# Patient Record
Sex: MALE | Race: WHITE | NOT HISPANIC OR LATINO
[De-identification: names, ages, dates, MRNs, and addresses within clinical notes are randomized per-mention and may not be internally consistent; named-entity substitution may affect disease eponyms.]

---

## 2017-04-02 PROBLEM — Z00.00 ENCOUNTER FOR PREVENTIVE HEALTH EXAMINATION: Status: ACTIVE | Noted: 2017-04-02

## 2017-05-01 ENCOUNTER — APPOINTMENT (OUTPATIENT)
Dept: CARDIOLOGY | Facility: CLINIC | Age: 52
End: 2017-05-01

## 2017-05-01 ENCOUNTER — NON-APPOINTMENT (OUTPATIENT)
Age: 52
End: 2017-05-01

## 2017-05-01 VITALS
HEART RATE: 73 BPM | BODY MASS INDEX: 29.06 KG/M2 | DIASTOLIC BLOOD PRESSURE: 87 MMHG | OXYGEN SATURATION: 96 % | HEIGHT: 70 IN | WEIGHT: 203 LBS | SYSTOLIC BLOOD PRESSURE: 138 MMHG

## 2017-05-01 DIAGNOSIS — Z87.891 PERSONAL HISTORY OF NICOTINE DEPENDENCE: ICD-10-CM

## 2017-05-01 DIAGNOSIS — Z83.49 FAMILY HISTORY OF OTHER ENDOCRINE, NUTRITIONAL AND METABOLIC DISEASES: ICD-10-CM

## 2017-05-01 RX ORDER — ATORVASTATIN CALCIUM 40 MG/1
40 TABLET, FILM COATED ORAL
Qty: 90 | Refills: 3 | Status: ACTIVE | COMMUNITY

## 2017-05-25 ENCOUNTER — APPOINTMENT (OUTPATIENT)
Dept: CARDIOLOGY | Facility: CLINIC | Age: 52
End: 2017-05-25

## 2017-05-26 ENCOUNTER — OTHER (OUTPATIENT)
Age: 52
End: 2017-05-26

## 2017-08-13 ENCOUNTER — FORM ENCOUNTER (OUTPATIENT)
Age: 52
End: 2017-08-13

## 2017-08-14 ENCOUNTER — APPOINTMENT (OUTPATIENT)
Dept: MRI IMAGING | Facility: HOSPITAL | Age: 52
End: 2017-08-14

## 2017-08-14 ENCOUNTER — OUTPATIENT (OUTPATIENT)
Dept: OUTPATIENT SERVICES | Facility: HOSPITAL | Age: 52
LOS: 1 days | End: 2017-08-14
Payer: COMMERCIAL

## 2017-08-14 DIAGNOSIS — Q25.1 COARCTATION OF AORTA: ICD-10-CM

## 2017-08-14 DIAGNOSIS — R07.9 CHEST PAIN, UNSPECIFIED: ICD-10-CM

## 2017-08-14 PROCEDURE — A9585: CPT

## 2017-08-14 PROCEDURE — 71555 MRI ANGIO CHEST W OR W/O DYE: CPT | Mod: 26

## 2017-08-14 PROCEDURE — C8909: CPT

## 2017-11-20 ENCOUNTER — APPOINTMENT (OUTPATIENT)
Dept: CARDIOLOGY | Facility: CLINIC | Age: 52
End: 2017-11-20
Payer: COMMERCIAL

## 2017-11-20 ENCOUNTER — NON-APPOINTMENT (OUTPATIENT)
Age: 52
End: 2017-11-20

## 2017-11-20 VITALS
WEIGHT: 201 LBS | OXYGEN SATURATION: 96 % | SYSTOLIC BLOOD PRESSURE: 125 MMHG | DIASTOLIC BLOOD PRESSURE: 70 MMHG | BODY MASS INDEX: 28.77 KG/M2 | HEART RATE: 63 BPM | HEIGHT: 70 IN

## 2017-11-20 PROCEDURE — 99215 OFFICE O/P EST HI 40 MIN: CPT

## 2017-11-20 PROCEDURE — 93000 ELECTROCARDIOGRAM COMPLETE: CPT

## 2018-02-27 ENCOUNTER — APPOINTMENT (OUTPATIENT)
Dept: CARDIOLOGY | Facility: CLINIC | Age: 53
End: 2018-02-27
Payer: COMMERCIAL

## 2018-02-27 PROCEDURE — 93015 CV STRESS TEST SUPVJ I&R: CPT

## 2018-02-28 ENCOUNTER — OTHER (OUTPATIENT)
Age: 53
End: 2018-02-28

## 2018-02-28 DIAGNOSIS — R94.39 ABNORMAL RESULT OF OTHER CARDIOVASCULAR FUNCTION STUDY: ICD-10-CM

## 2018-03-19 ENCOUNTER — APPOINTMENT (OUTPATIENT)
Dept: CARDIOLOGY | Facility: CLINIC | Age: 53
End: 2018-03-19
Payer: COMMERCIAL

## 2018-03-19 PROCEDURE — A9500: CPT

## 2018-03-19 PROCEDURE — 78452 HT MUSCLE IMAGE SPECT MULT: CPT

## 2018-03-19 PROCEDURE — 93015 CV STRESS TEST SUPVJ I&R: CPT

## 2019-06-28 ENCOUNTER — NON-APPOINTMENT (OUTPATIENT)
Age: 54
End: 2019-06-28

## 2019-06-28 ENCOUNTER — APPOINTMENT (OUTPATIENT)
Dept: CARDIOLOGY | Facility: CLINIC | Age: 54
End: 2019-06-28
Payer: COMMERCIAL

## 2019-06-28 VITALS
HEIGHT: 70 IN | DIASTOLIC BLOOD PRESSURE: 81 MMHG | BODY MASS INDEX: 29.06 KG/M2 | SYSTOLIC BLOOD PRESSURE: 131 MMHG | OXYGEN SATURATION: 94 % | WEIGHT: 203 LBS | HEART RATE: 58 BPM

## 2019-06-28 PROCEDURE — 99214 OFFICE O/P EST MOD 30 MIN: CPT

## 2019-06-28 PROCEDURE — 93000 ELECTROCARDIOGRAM COMPLETE: CPT

## 2019-06-28 NOTE — PHYSICAL EXAM
[General Appearance - Well Developed] : well developed [Well Groomed] : well groomed [Normal Appearance] : normal appearance [No Deformities] : no deformities [General Appearance - In No Acute Distress] : no acute distress [General Appearance - Well Nourished] : well nourished [Normal Conjunctiva] : the conjunctiva exhibited no abnormalities [Eyelids - No Xanthelasma] : the eyelids demonstrated no xanthelasmas [Normal Oral Mucosa] : normal oral mucosa [No Oral Pallor] : no oral pallor [No Oral Cyanosis] : no oral cyanosis [Normal Jugular Venous A Waves Present] : normal jugular venous A waves present [Normal Jugular Venous V Waves Present] : normal jugular venous V waves present [Respiration, Rhythm And Depth] : normal respiratory rhythm and effort [No Jugular Venous Rachel A Waves] : no jugular venous rachel A waves [Auscultation Breath Sounds / Voice Sounds] : lungs were clear to auscultation bilaterally [Exaggerated Use Of Accessory Muscles For Inspiration] : no accessory muscle use [Abdomen Tenderness] : non-tender [Abdomen Soft] : soft [Abnormal Walk] : normal gait [Abdomen Mass (___ Cm)] : no abdominal mass palpated [Gait - Sufficient For Exercise Testing] : the gait was sufficient for exercise testing [Nail Clubbing] : no clubbing of the fingernails [Cyanosis, Localized] : no localized cyanosis [Petechial Hemorrhages (___cm)] : no petechial hemorrhages [Skin Color & Pigmentation] : normal skin color and pigmentation [] : no rash [No Venous Stasis] : no venous stasis [No Xanthoma] : no  xanthoma was observed [No Skin Ulcers] : no skin ulcer [Skin Lesions] : no skin lesions [Oriented To Time, Place, And Person] : oriented to person, place, and time [Affect] : the affect was normal [Normal Rate] : normal [No Anxiety] : not feeling anxious [Mood] : the mood was normal [Rhythm Regular] : regular [Normal S1] : normal S1 [Normal S2] : normal S2 [No Gallop] : no gallop heard [S3] : no S3 [II] : a grade 2 [S4] : no S4 [III] : a grade 3 [I] : a grade 1 [Right Carotid Bruit] : right carotid bruit heard [Left Carotid Bruit] : no bruit heard over the left carotid [Right Femoral Bruit] : no bruit heard over the right femoral artery [Left Femoral Bruit] : no bruit heard over the left femoral artery [No Pitting Edema] : no pitting edema present [1+] : left 1+ [Bruit] : no bruit heard

## 2019-06-28 NOTE — HISTORY OF PRESENT ILLNESS
[FreeTextEntry1] : Dean Atkins presented to the office today for a cardiovascular evaluation. He was last seen in the office in 2017.\par \par He is now 54 years old, with a history of coarctation of aorta, for which he had surgery many years ago.  A modest degree of stenosis remains based on MRA from August of 2017. He has a bicuspid aortic valve, with moderate AS based on echo from May, 2017.\par \par When he was evaluated in the office in 2017 he was feeling well. He reported dyspnea when he would "run", which was long-standing, and without change.\par \par He presents to the office today having been feeling well. He reports no change in the degree of dyspnea that he might experience with running. He does not run often.  He does not report chest discomfort suggestive of angina. He denies any other new symptoms, including dizziness or syncope. His blood pressure and cholesterol have been controlled.

## 2019-07-11 ENCOUNTER — APPOINTMENT (OUTPATIENT)
Dept: CARDIOLOGY | Facility: CLINIC | Age: 54
End: 2019-07-11
Payer: COMMERCIAL

## 2019-07-11 PROCEDURE — 93306 TTE W/DOPPLER COMPLETE: CPT

## 2019-08-06 ENCOUNTER — FORM ENCOUNTER (OUTPATIENT)
Age: 54
End: 2019-08-06

## 2019-08-07 ENCOUNTER — OUTPATIENT (OUTPATIENT)
Dept: OUTPATIENT SERVICES | Facility: HOSPITAL | Age: 54
LOS: 1 days | End: 2019-08-07
Payer: COMMERCIAL

## 2019-08-07 ENCOUNTER — APPOINTMENT (OUTPATIENT)
Dept: MRI IMAGING | Facility: CLINIC | Age: 54
End: 2019-08-07
Payer: COMMERCIAL

## 2019-08-07 DIAGNOSIS — Q25.1 COARCTATION OF AORTA: ICD-10-CM

## 2019-08-07 DIAGNOSIS — I71.2 THORACIC AORTIC ANEURYSM, WITHOUT RUPTURE: ICD-10-CM

## 2019-08-07 DIAGNOSIS — Q23.1 CONGENITAL INSUFFICIENCY OF AORTIC VALVE: ICD-10-CM

## 2019-08-07 PROCEDURE — C8911: CPT

## 2019-08-07 PROCEDURE — 71555 MRI ANGIO CHEST W OR W/O DYE: CPT | Mod: 26

## 2019-08-07 PROCEDURE — A9585: CPT

## 2020-02-11 NOTE — DISCUSSION/SUMMARY
Tele call placed to Sharmaine and she stated that [facility] pharmacy told her that Oxycodone 10 mg is not being approved at that they do not have Percocet refill on file.     Writer contacted [facility] pharmacy and pharmacist stated they have Percocet on file and that the oxycodone is not approved. Writer told pharmacy that we received \"Oxycontin SR Approved  Prior Authorization Number: [id number]  Dates of approval: Oxycontin 10mg extended-release tablet, use as directed, is approved for 6 months through 8/11/2020.   Filling Pharmacy: [facility]  Additional Information: Approvedtoday  Request Reference Number: [id number]. OXYCONTIN TAB 10MG CR is approved through 08/11/2020. For further questions, call [phone].  Pharmacy contacted - received paid claim.  Pharmacy will contact patient when medication is ready to be picked up. \"     Pharmacist tried running the medication through again and stated they are saying it is not approved. Writer read above message to pharmacist and then 5 seconds later they stated they have it and its approved.    Writer contacted patient and relayed above message and informed patient to call [facility] pharmacy to know when medication will be ready for . Patient verbalized understanding and denied further questions.    [FreeTextEntry1] : Dean has a history of a repaired coarctation of the aorta. He has a history of a bicuspid aortic valve with moderate stenosis. He has controlled hypertension and dyslipidemia. He presents to the office today feeling generally well.\par \par I have suggested a followup echocardiogram, which has not been performed in about 2 years. He will schedule this, and I will be in contact with him to discuss the results. I have requested his most recent cholesterol profile for my review. If all is well, he will see me back in the office in approximately one year.

## 2020-10-30 ENCOUNTER — NON-APPOINTMENT (OUTPATIENT)
Age: 55
End: 2020-10-30

## 2020-10-30 ENCOUNTER — APPOINTMENT (OUTPATIENT)
Dept: CARDIOLOGY | Facility: CLINIC | Age: 55
End: 2020-10-30
Payer: COMMERCIAL

## 2020-10-30 VITALS
HEART RATE: 84 BPM | WEIGHT: 198 LBS | HEIGHT: 70 IN | SYSTOLIC BLOOD PRESSURE: 124 MMHG | DIASTOLIC BLOOD PRESSURE: 80 MMHG | BODY MASS INDEX: 28.35 KG/M2 | OXYGEN SATURATION: 97 %

## 2020-10-30 PROCEDURE — 99214 OFFICE O/P EST MOD 30 MIN: CPT

## 2020-10-30 PROCEDURE — 99072 ADDL SUPL MATRL&STAF TM PHE: CPT

## 2020-10-30 PROCEDURE — 93000 ELECTROCARDIOGRAM COMPLETE: CPT

## 2020-10-30 NOTE — PHYSICAL EXAM
[General Appearance - Well Developed] : well developed [Normal Appearance] : normal appearance [Well Groomed] : well groomed [General Appearance - Well Nourished] : well nourished [No Deformities] : no deformities [General Appearance - In No Acute Distress] : no acute distress [Normal Conjunctiva] : the conjunctiva exhibited no abnormalities [Eyelids - No Xanthelasma] : the eyelids demonstrated no xanthelasmas [Normal Oral Mucosa] : normal oral mucosa [No Oral Pallor] : no oral pallor [No Oral Cyanosis] : no oral cyanosis [Normal Jugular Venous A Waves Present] : normal jugular venous A waves present [Normal Jugular Venous V Waves Present] : normal jugular venous V waves present [No Jugular Venous Rachel A Waves] : no jugular venous rachel A waves [Respiration, Rhythm And Depth] : normal respiratory rhythm and effort [Exaggerated Use Of Accessory Muscles For Inspiration] : no accessory muscle use [Auscultation Breath Sounds / Voice Sounds] : lungs were clear to auscultation bilaterally [Abdomen Soft] : soft [Abdomen Tenderness] : non-tender [Abdomen Mass (___ Cm)] : no abdominal mass palpated [Abnormal Walk] : normal gait [Gait - Sufficient For Exercise Testing] : the gait was sufficient for exercise testing [Nail Clubbing] : no clubbing of the fingernails [Cyanosis, Localized] : no localized cyanosis [Petechial Hemorrhages (___cm)] : no petechial hemorrhages [Skin Color & Pigmentation] : normal skin color and pigmentation [] : no rash [No Venous Stasis] : no venous stasis [Skin Lesions] : no skin lesions [No Skin Ulcers] : no skin ulcer [No Xanthoma] : no  xanthoma was observed [Oriented To Time, Place, And Person] : oriented to person, place, and time [Affect] : the affect was normal [Mood] : the mood was normal [No Anxiety] : not feeling anxious [Normal Rate] : normal [Rhythm Regular] : regular [Normal S1] : normal S1 [Normal S2] : normal S2 [No Gallop] : no gallop heard [III] : a grade 3 [II] : a grade 2 [I] : a grade 1 [Right Carotid Bruit] : right carotid bruit heard [1+] : left 1+ [No Pitting Edema] : no pitting edema present [S3] : no S3 [S4] : no S4 [Left Carotid Bruit] : no bruit heard over the left carotid [Right Femoral Bruit] : no bruit heard over the right femoral artery [Left Femoral Bruit] : no bruit heard over the left femoral artery [Bruit] : no bruit heard

## 2020-10-30 NOTE — DISCUSSION/SUMMARY
[FreeTextEntry1] : Dean has a history of a repaired coarctation of the aorta. He has a history of a bicuspid aortic valve with moderate stenosis. He has controlled hypertension and dyslipidemia. He presents to the office today feeling generally well.\par \par I have suggested a followup echocardiogram. He will schedule this, and I will be in contact with him to discuss the results. If all is well, he will see me back in the office in approximately one year.

## 2020-10-30 NOTE — HISTORY OF PRESENT ILLNESS
[FreeTextEntry1] : Dean Atkins presented to the office today for a cardiovascular evaluation. He was last seen in the office 1 year ago.\par \par He is now 55 years old, with a history of coarctation of aorta, for which he had surgery many years ago.  A modest degree of stenosis remains based on MRA from August of 2017. He has a bicuspid aortic valve, with moderate AS based on echo.  He has persistent narrowing in the region of his coarct, which was stable based on MRA from 2019.\par \par When he was evaluated in the office in 2017 he was feeling well. He reported dyspnea when he would "run", which was long-standing, and without change. Echo last year revealed moderate bicuspid AS.\par \par He presents to the office today having been feeling well. He reports no change in the degree of dyspnea that he might experience with activity, and notes that he is sedentary.  He does not report chest discomfort suggestive of angina. He denies any other new symptoms, including dizziness or syncope. His blood pressure and cholesterol have been controlled.

## 2020-12-21 ENCOUNTER — APPOINTMENT (OUTPATIENT)
Dept: CARDIOLOGY | Facility: CLINIC | Age: 55
End: 2020-12-21
Payer: COMMERCIAL

## 2020-12-21 PROCEDURE — 93306 TTE W/DOPPLER COMPLETE: CPT

## 2020-12-21 PROCEDURE — 99072 ADDL SUPL MATRL&STAF TM PHE: CPT

## 2020-12-21 RX ADMIN — PERFLUTREN MG/ML: 6.52 INJECTION, SUSPENSION INTRAVENOUS at 00:00

## 2020-12-22 RX ORDER — PERFLUTREN 6.52 MG/ML
6.52 INJECTION, SUSPENSION INTRAVENOUS
Qty: 1 | Refills: 0 | Status: COMPLETED | OUTPATIENT
Start: 2020-12-21

## 2021-12-09 ENCOUNTER — APPOINTMENT (OUTPATIENT)
Dept: CARDIOLOGY | Facility: CLINIC | Age: 56
End: 2021-12-09
Payer: COMMERCIAL

## 2021-12-09 ENCOUNTER — NON-APPOINTMENT (OUTPATIENT)
Age: 56
End: 2021-12-09

## 2021-12-09 VITALS
DIASTOLIC BLOOD PRESSURE: 73 MMHG | OXYGEN SATURATION: 98 % | SYSTOLIC BLOOD PRESSURE: 121 MMHG | HEART RATE: 69 BPM | HEIGHT: 70 IN | BODY MASS INDEX: 28.35 KG/M2 | WEIGHT: 198 LBS

## 2021-12-09 PROCEDURE — 99214 OFFICE O/P EST MOD 30 MIN: CPT

## 2021-12-09 PROCEDURE — 93000 ELECTROCARDIOGRAM COMPLETE: CPT

## 2021-12-09 NOTE — HISTORY OF PRESENT ILLNESS
[FreeTextEntry1] : Dean Atkins presented to the office today for a cardiovascular evaluation. He was last seen in the office 1 year ago.\par \par He is now 56 years old, with a history of coarctation of aorta, for which he had surgery many years ago.  A modest degree of stenosis remains based on MRA from August of 2017. He has a bicuspid aortic valve, with moderate AS based on echo.  He has persistent narrowing in the region of his coarct, which was stable based on MRA from 2019.\par \par When he was evaluated in the office in 2017 he was feeling well. He reported dyspnea when he would "run", which was long-standing, and without change. Echo last year revealed unchanged bicuspid AS.\par \par He presents to the office today having been feeling well. He reports no change in the degree of fatigue that he might experience with activity, and notes that he is sedentary.  He is even more sedentary than he was previously owing to the pandemic, though he is going to start going back to the city for work a few times a week starting in January.  He does not report chest discomfort suggestive of angina. He denies any other new symptoms, including dizziness or syncope. His blood pressure and cholesterol have been controlled.  He was diagnosed with a fatty liver.  He remains on atorvastatin.

## 2021-12-16 ENCOUNTER — APPOINTMENT (OUTPATIENT)
Dept: CARDIOLOGY | Facility: CLINIC | Age: 56
End: 2021-12-16
Payer: COMMERCIAL

## 2021-12-16 PROCEDURE — 93306 TTE W/DOPPLER COMPLETE: CPT

## 2022-10-27 ENCOUNTER — NON-APPOINTMENT (OUTPATIENT)
Age: 57
End: 2022-10-27

## 2022-10-27 ENCOUNTER — APPOINTMENT (OUTPATIENT)
Dept: CARDIOLOGY | Facility: CLINIC | Age: 57
End: 2022-10-27

## 2022-10-27 VITALS
SYSTOLIC BLOOD PRESSURE: 127 MMHG | HEART RATE: 72 BPM | BODY MASS INDEX: 28.92 KG/M2 | WEIGHT: 202 LBS | HEIGHT: 70 IN | DIASTOLIC BLOOD PRESSURE: 78 MMHG | OXYGEN SATURATION: 95 %

## 2022-10-27 PROCEDURE — 99215 OFFICE O/P EST HI 40 MIN: CPT | Mod: 25

## 2022-10-27 PROCEDURE — 93000 ELECTROCARDIOGRAM COMPLETE: CPT

## 2022-10-27 NOTE — PHYSICAL EXAM
[General Appearance - Well Developed] : well developed [Normal Appearance] : normal appearance [Well Groomed] : well groomed [General Appearance - Well Nourished] : well nourished [No Deformities] : no deformities [General Appearance - In No Acute Distress] : no acute distress [Normal Conjunctiva] : the conjunctiva exhibited no abnormalities [Eyelids - No Xanthelasma] : the eyelids demonstrated no xanthelasmas [Normal Oral Mucosa] : normal oral mucosa [No Oral Pallor] : no oral pallor [No Oral Cyanosis] : no oral cyanosis [Normal Jugular Venous A Waves Present] : normal jugular venous A waves present [Normal Jugular Venous V Waves Present] : normal jugular venous V waves present [No Jugular Venous Rachel A Waves] : no jugular venous rachel A waves [Respiration, Rhythm And Depth] : normal respiratory rhythm and effort [Exaggerated Use Of Accessory Muscles For Inspiration] : no accessory muscle use [Abdomen Soft] : soft [Auscultation Breath Sounds / Voice Sounds] : lungs were clear to auscultation bilaterally [Abdomen Tenderness] : non-tender [Abdomen Mass (___ Cm)] : no abdominal mass palpated [Abnormal Walk] : normal gait [Gait - Sufficient For Exercise Testing] : the gait was sufficient for exercise testing [Nail Clubbing] : no clubbing of the fingernails [Cyanosis, Localized] : no localized cyanosis [Petechial Hemorrhages (___cm)] : no petechial hemorrhages [Skin Color & Pigmentation] : normal skin color and pigmentation [] : no rash [No Venous Stasis] : no venous stasis [Skin Lesions] : no skin lesions [No Skin Ulcers] : no skin ulcer [No Xanthoma] : no  xanthoma was observed [Oriented To Time, Place, And Person] : oriented to person, place, and time [Affect] : the affect was normal [Mood] : the mood was normal [No Anxiety] : not feeling anxious [Normal Rate] : normal [Rhythm Regular] : regular [Normal S1] : normal S1 [Normal S2] : normal S2 [No Gallop] : no gallop heard [I] : a grade 1 [Right Carotid Bruit] : right carotid bruit heard [1+] : left 1+ [No Pitting Edema] : no pitting edema present [S3] : no S3 [S4] : no S4 [III] : a grade 3 [Left Carotid Bruit] : no bruit heard over the left carotid [Right Femoral Bruit] : no bruit heard over the right femoral artery [Left Femoral Bruit] : no bruit heard over the left femoral artery [Bruit] : no bruit heard

## 2022-10-27 NOTE — HISTORY OF PRESENT ILLNESS
[FreeTextEntry1] : Dean Atkins presented to the office today for a cardiovascular evaluation. He was last seen in the office 1 year ago.\par \par He is now 57 years old, with a history of coarctation of aorta, for which he had surgery many years ago.  A modest degree of stenosis remains based on MRA from August of 2017. He has a bicuspid aortic valve, with moderate AS based on echo.  He has persistent narrowing in the region of his coarct, which was stable based on MRA from 2019.\par \par When he was evaluated in the office last year he was feeling well. He reported fatigue with his increasingly limited activity. Echo revealed unchanged bicuspid AS.\par \par He presents to the office today having been feeling well. He reports no change in the degree of fatigue that he might experience with activity, and notes that he is sedentary.  He has been walking to work via the [employer], and it involves stairs and 1.5 miles in one direction. He also gardens.  He gets tired walking the stairs, but nothing has changed over the years.  He does not report chest discomfort suggestive of angina. He denies any other new symptoms, including dizziness or syncope. His blood pressure and cholesterol have been controlled.  He was diagnosed with a fatty liver.  He remains on atorvastatin.

## 2022-10-27 NOTE — DISCUSSION/SUMMARY
[FreeTextEntry1] : Dean has a history of a repaired coarctation of the aorta. He has a history of a bicuspid aortic valve with borderline severe stenosis. I reviewed his studies from  2017, 2019, 2020 and 2021. He has controlled hypertension and dyslipidemia. He presents to the office today feeling generally well.\par \par I have suggested a followup echocardiogram.  We discussed that his numbers have been progressive, and that even though some examination features suggest more moderate stenosis, and not severe, his numbers are climbing, and at some point we will need to refer him for aortic valve replacement.  His peak and mean gradients were already quite high in 2021.  We will see how things look this year.  Clinically, things are stable, but it may be time to refer him for a procedure.  \par \par

## 2022-11-03 ENCOUNTER — APPOINTMENT (OUTPATIENT)
Dept: CARDIOLOGY | Facility: CLINIC | Age: 57
End: 2022-11-03

## 2022-11-03 PROCEDURE — 93306 TTE W/DOPPLER COMPLETE: CPT

## 2022-11-18 PROBLEM — E78.00 HYPERCHOLESTEROLEMIA: Status: ACTIVE | Noted: 2017-05-01

## 2022-11-22 ENCOUNTER — NON-APPOINTMENT (OUTPATIENT)
Age: 57
End: 2022-11-22

## 2022-11-22 ENCOUNTER — RESULT REVIEW (OUTPATIENT)
Age: 57
End: 2022-11-22

## 2022-11-22 ENCOUNTER — APPOINTMENT (OUTPATIENT)
Dept: CARDIOTHORACIC SURGERY | Facility: CLINIC | Age: 57
End: 2022-11-22

## 2022-11-22 VITALS
DIASTOLIC BLOOD PRESSURE: 84 MMHG | BODY MASS INDEX: 28.63 KG/M2 | SYSTOLIC BLOOD PRESSURE: 145 MMHG | HEART RATE: 78 BPM | OXYGEN SATURATION: 97 % | WEIGHT: 200 LBS | HEIGHT: 70 IN | RESPIRATION RATE: 16 BRPM

## 2022-11-22 VITALS
HEART RATE: 78 BPM | RESPIRATION RATE: 16 BRPM | OXYGEN SATURATION: 97 % | WEIGHT: 200 LBS | HEIGHT: 70 IN | TEMPERATURE: 98 F | DIASTOLIC BLOOD PRESSURE: 84 MMHG | SYSTOLIC BLOOD PRESSURE: 145 MMHG | BODY MASS INDEX: 28.63 KG/M2

## 2022-11-22 DIAGNOSIS — R53.83 OTHER FATIGUE: ICD-10-CM

## 2022-11-22 DIAGNOSIS — Z11.52 ENCOUNTER FOR SCREENING FOR COVID-19: ICD-10-CM

## 2022-11-22 DIAGNOSIS — I50.32 CHRONIC DIASTOLIC (CONGESTIVE) HEART FAILURE: ICD-10-CM

## 2022-11-22 DIAGNOSIS — Z87.74 PERSONAL HISTORY OF (CORRECTED) CONGENITAL MALFORMATIONS OF HEART AND CIRCULATORY SYSTEM: ICD-10-CM

## 2022-11-22 DIAGNOSIS — I10 ESSENTIAL (PRIMARY) HYPERTENSION: ICD-10-CM

## 2022-11-22 DIAGNOSIS — I50.9 HEART FAILURE, UNSPECIFIED: ICD-10-CM

## 2022-11-22 DIAGNOSIS — E78.00 PURE HYPERCHOLESTEROLEMIA, UNSPECIFIED: ICD-10-CM

## 2022-11-22 DIAGNOSIS — R06.02 SHORTNESS OF BREATH: ICD-10-CM

## 2022-11-22 LAB
BASOPHILS # BLD AUTO: 0.04 K/UL
BASOPHILS NFR BLD AUTO: 0.4 %
EOSINOPHIL # BLD AUTO: 0.08 K/UL
EOSINOPHIL NFR BLD AUTO: 0.9 %
HCT VFR BLD CALC: 49.3 %
HGB BLD-MCNC: 16.1 G/DL
IMM GRANULOCYTES NFR BLD AUTO: 0.3 %
LYMPHOCYTES # BLD AUTO: 1.48 K/UL
LYMPHOCYTES NFR BLD AUTO: 16 %
MAN DIFF?: NORMAL
MCHC RBC-ENTMCNC: 27 PG
MCHC RBC-ENTMCNC: 32.7 GM/DL
MCV RBC AUTO: 82.7 FL
MONOCYTES # BLD AUTO: 0.52 K/UL
MONOCYTES NFR BLD AUTO: 5.6 %
NEUTROPHILS # BLD AUTO: 7.08 K/UL
NEUTROPHILS NFR BLD AUTO: 76.8 %
PLATELET # BLD AUTO: 330 K/UL
RBC # BLD: 5.96 M/UL
RBC # FLD: 12.8 %
WBC # FLD AUTO: 9.23 K/UL

## 2022-11-22 PROCEDURE — 99205 OFFICE O/P NEW HI 60 MIN: CPT

## 2022-11-22 PROCEDURE — 99204 OFFICE O/P NEW MOD 45 MIN: CPT

## 2022-11-22 RX ORDER — MULTIVITAMIN
TABLET ORAL DAILY
Refills: 0 | Status: ACTIVE | COMMUNITY
Start: 2022-11-22

## 2022-11-22 NOTE — REVIEW OF SYSTEMS
[Feeling Tired] : feeling tired [SOB on Exertion] : shortness of breath during exertion [Eye Pain] : no eye pain [Earache] : no earache [Chest Pain] : no chest pain [Abdominal Pain] : no abdominal pain [Dysuria] : no dysuria [Joint Swelling] : no joint swelling [Skin Lesions] : no skin lesions [Dizziness] : no dizziness [Anxiety] : no anxiety [Muscle Weakness] : no muscle weakness [Easy Bleeding] : no tendency for easy bleeding

## 2022-11-22 NOTE — ASSESSMENT
[FreeTextEntry1] : Mr. Atkins is a 57 year old male with past history of HTN and HLD. He has a known history of Bicuspid Aortic Valve Disease, that is being referred to us By Dr. Huertas for evaluation of his Aortic Stenosis. \par \par He has a history of Aortic Coarctation repair in the past, and still has a persistent narrowing which has been followed and stable. \par \par TTE on 11/2022\par Aortic Root(cm) 4.3  cm  Final    \par  TERRIE 0.7  cm2  Final    \par  AV Mean Gr(mmHg) 55.0  mmHg     \par  AV Peak Gr(mmHg) 91.7  mmHg     \par  AV Peak Awais(m/s) 4.7  m/s    \par  Ao Stenosis Severe      \par  EF Cai(%) 63  %     \par  EF Teicholtz(%) 50  % \par \par Plan:\par 1) Structural CT scan\par 2) Cardiac catherization  with pull back across the coarc repair\par ABIs\par   return to clinic to discuss results.\par \par \par

## 2022-11-22 NOTE — END OF VISIT
[Time Spent: ___ minutes] : I have spent [unfilled] minutes of time on the encounter. [FreeTextEntry3] : I personally performed the services described in the documentation, reviewed the documentation recorded by the scribe in my presence and it accurately and completely records my words and actions.\par \par I, Ronda David NP, am scribing for Dr. Buster Juan, the following sections HISTORY OF PRESENT ILLNESS, PAST MEDICAL/FAMILY/SOCIAL HISTORY; REVIEW OF SYSTEMS; VITAL SIGNS; PHYSICAL EXAM; DISPOSITION.\par

## 2022-11-22 NOTE — DATA REVIEWED
[FreeTextEntry1] : TTE on 11/2022\par Aortic Root(cm) 4.3  cm  Final    \par  TERRIE 0.7  cm2  Final    \par  AV Mean Gr(mmHg) 55.0  mmHg     \par  AV Peak Gr(mmHg) 91.7  mmHg     \par  AV Peak Awais(m/s) 4.7  m/s    \par  Ao Stenosis Severe      \par  EF Cai(%) 63  %     \par  EF Teicholtz(%) 50  % \par

## 2022-11-22 NOTE — HISTORY OF PRESENT ILLNESS
[FreeTextEntry1] : Mr. Atkins is a 57 year old male with past history of HTN and HLD. He has a known history of Bicuspid Aortic Valve Disease, that is being referred to us By Dr. Huertas for evaluation of his Aortic Stenosis. \par \par He has a history of Aortic Coarctation repair in the past, and still has a persistent narrowing which has been followed and stable. \par \par TTE on 11/2022\par Aortic Root(cm) 4.3  cm  Final    \par  TERRIE 0.7  cm2  Final    \par  AV Mean Gr(mmHg) 55.0  mmHg     \par  AV Peak Gr(mmHg) 91.7  mmHg     \par  AV Peak Awais(m/s) 4.7  m/s    \par  Ao Stenosis Severe      \par  EF Cai(%) 63  %     \par  EF Teicholtz(%) 50  % \par

## 2022-11-22 NOTE — PHYSICAL EXAM
[General Appearance - Alert] : alert [Sclera] : the sclera and conjunctiva were normal [Outer Ear] : the ears and nose were normal in appearance [Jugular Venous Distention Increased] : there was no jugular-venous distention [Respiration, Rhythm And Depth] : normal respiratory rhythm and effort [Auscultation Breath Sounds / Voice Sounds] : lungs were clear to auscultation bilaterally [III] : a grade 3 [Breast Appearance] : normal in appearance [Bowel Sounds] : normal bowel sounds [Abdomen Soft] : soft [Cervical Lymph Nodes Enlarged Posterior Bilaterally] : posterior cervical [Cervical Lymph Nodes Enlarged Anterior Bilaterally] : anterior cervical [No CVA Tenderness] : no ~M costovertebral angle tenderness [Involuntary Movements] : no involuntary movements were seen [Skin Color & Pigmentation] : normal skin color and pigmentation [No Focal Deficits] : no focal deficits [Oriented To Time, Place, And Person] : oriented to person, place, and time [Impaired Insight] : insight and judgment were intact [Right Carotid Bruit] : no bruit heard over the right carotid [Left Carotid Bruit] : no bruit heard over the left carotid [FreeTextEntry1] : deferred

## 2022-11-23 LAB
ALBUMIN SERPL ELPH-MCNC: 5 G/DL
ALP BLD-CCNC: 178 U/L
ALT SERPL-CCNC: 23 U/L
ANION GAP SERPL CALC-SCNC: 19 MMOL/L
AST SERPL-CCNC: 22 U/L
BILIRUB SERPL-MCNC: 0.4 MG/DL
BUN SERPL-MCNC: 15 MG/DL
CALCIUM SERPL-MCNC: 10.3 MG/DL
CHLORIDE SERPL-SCNC: 102 MMOL/L
CO2 SERPL-SCNC: 21 MMOL/L
CREAT SERPL-MCNC: 1.28 MG/DL
EGFR: 66 ML/MIN/1.73M2
GLUCOSE SERPL-MCNC: 104 MG/DL
NT-PROBNP SERPL-MCNC: 143 PG/ML
POTASSIUM SERPL-SCNC: 4.5 MMOL/L
PROT SERPL-MCNC: 7.6 G/DL
SODIUM SERPL-SCNC: 142 MMOL/L

## 2022-11-26 PROBLEM — R53.83 FATIGUE: Status: ACTIVE | Noted: 2022-11-18

## 2022-11-26 PROBLEM — I10 ESSENTIAL HYPERTENSION: Status: ACTIVE | Noted: 2017-05-01

## 2022-11-26 PROBLEM — R06.02 SHORTNESS OF BREATH: Status: ACTIVE | Noted: 2017-11-20

## 2022-11-26 NOTE — REVIEW OF SYSTEMS
[Feeling Fatigued] : feeling fatigued [Dyspnea on exertion] : dyspnea during exertion [Heartburn] : heartburn [Negative] : Heme/Lymph [Chest Discomfort] : no chest discomfort [Lower Ext Edema] : no extremity edema [Leg Claudication] : no intermittent leg claudication [Palpitations] : no palpitations

## 2022-11-26 NOTE — PHYSICAL EXAM
[No Rub] : no rub [Murmur] : murmur [No Edema] : no edema [Normal] : alert and oriented, normal memory [Normal Rate] : normal [Rhythm Regular] : regular [IV] : a grade 4 [III] : a grade 3 [II] : a grade 2 [No Pitting Edema] : no pitting edema present [de-identified] : III/VI ÁLVARO

## 2022-11-26 NOTE — DISCUSSION/SUMMARY
[FreeTextEntry1] : Dean has a bicuspid AV with severe stenosis and subtle fatigue and dyspnea with certain exertion (NYHA II). He had a remote Ao coarctation repair with the last MRA reporting a residual coarctation that remains stable since the prior exam in 2017 (with the aorta measuring 10mm at that location). I had an extensive discussion with the patient and my CTS colleague, Dr Juan, regarding our impressions and strategy for further evaluation. We have recommended the following:\par \par 1.  Cardiac catheterization--coronary angiography with a pullback gradient across the Ao coarctation\par 2.  Cardiac structural CT--to assess his BAV anatomy and morphology (as I explained in detail, not all bicuspid aortic valves are favorable for TOMMY, which is his stated preference); the CTA will also allow for further assessment of his Ao coarctation (sizing and degree of residual stenosis). Given that he does have symptoms in his lower extremities with exertion, this raises the possibility that the residual coarctation is hemodynamically / physiologically significant. That said, in the context of severe AS, it is unclear how those could be teased apart with respect to symptom causality. \par \par Once completed, I suggested he return to see us to discuss the findings in detail, at which time we would recommend our consensus opinion as to the optimal treatment strategy based on our assessment of the imaging.

## 2022-11-26 NOTE — HISTORY OF PRESENT ILLNESS
[FreeTextEntry1] : Mr. Atkins is a 57 year old male with a reported history of a bicuspid aortic valve who is referred by Dr. Huertas for evaluation of aortic stenosis. He has a history of remote cardiac surgery with an aortic coarctation repair as a child; based on an MRA of the chest in 2019, he was found to have a "juxtaductal coarctation of the aorta" that was unchanged as compared to a prior exam in 2017. At the location of the coarctation the aorta measured 10mm. He also has a history of treated HTN and HLD.\par \par He presents today without any current complaints. He notes he gets fatigued a little easier over the past few years but denies any dyspnea, angina, or pedal edema. He is able to walk a mile and a half without issue. He notes that after going up and down stairs "by the end of the stairs" his legs feel fatigued. If he were to run a short period, such as to cross the street, he will get a little winded. He is a former smoker and notes minimal ETOH Use. He is independent in his ADL's. He uses 3 pillows at night due to GERD but has no PND or orthopnea.\par \par Evaluation of his recent transthoracic echocardiogram with Dr Milagros Lechuga demonstrates severe stenosis of a likely bicuspid aortic valve with an TERRIE of 0.68 sqcm, peak and mean gradients of 95 and 48 mmHg, a peak velocity of 4.9 m/s, and a DVI of 0.15; LV function is normal.\par \par As noted, he had an aortic coarctation repair as child in [place] with Drs Huey and Lawrence. Dr Huertas has been following his BAV for some time and his referral today was prompted by progression of AS on his recent TTE. He does note becoming dyspneic if he were to run but not if he is walking at a normal pace on level ground--he walks a mile and a half from [facility] to his office without issue. He is able to go [place] and [place] the subway stairs without dyspnea--but does feel some cramps and tightness "in the calf area" which his wife has also noted. He reports "feeling fatigue in the legs more than anything" but no angina, dizziness, or syncope. He has had migraines most of his life but not as of late. He has no symptoms in his upper extremities with exertion or at rest. He works sitting at a computer and does not do much strenuous activity.

## 2022-12-02 ENCOUNTER — APPOINTMENT (OUTPATIENT)
Dept: CARDIOLOGY | Facility: CLINIC | Age: 57
End: 2022-12-02

## 2022-12-02 ENCOUNTER — OUTPATIENT (OUTPATIENT)
Dept: OUTPATIENT SERVICES | Facility: HOSPITAL | Age: 57
LOS: 1 days | End: 2022-12-02
Payer: COMMERCIAL

## 2022-12-02 DIAGNOSIS — I35.0 NONRHEUMATIC AORTIC (VALVE) STENOSIS: ICD-10-CM

## 2022-12-02 DIAGNOSIS — Z00.00 ENCOUNTER FOR GENERAL ADULT MEDICAL EXAMINATION WITHOUT ABNORMAL FINDINGS: ICD-10-CM

## 2022-12-02 PROCEDURE — 75572 CT HRT W/3D IMAGE: CPT

## 2022-12-02 PROCEDURE — 75572 CT HRT W/3D IMAGE: CPT | Mod: 26

## 2022-12-07 LAB — SARS-COV-2 N GENE NPH QL NAA+PROBE: NOT DETECTED

## 2022-12-09 ENCOUNTER — OUTPATIENT (OUTPATIENT)
Dept: OUTPATIENT SERVICES | Facility: HOSPITAL | Age: 57
LOS: 1 days | End: 2022-12-09
Payer: COMMERCIAL

## 2022-12-09 ENCOUNTER — TRANSCRIPTION ENCOUNTER (OUTPATIENT)
Age: 57
End: 2022-12-09

## 2022-12-09 VITALS
HEIGHT: 70 IN | WEIGHT: 199.96 LBS | OXYGEN SATURATION: 96 % | TEMPERATURE: 98 F | DIASTOLIC BLOOD PRESSURE: 74 MMHG | SYSTOLIC BLOOD PRESSURE: 132 MMHG | HEART RATE: 70 BPM | RESPIRATION RATE: 14 BRPM

## 2022-12-09 VITALS
HEART RATE: 66 BPM | DIASTOLIC BLOOD PRESSURE: 62 MMHG | SYSTOLIC BLOOD PRESSURE: 119 MMHG | RESPIRATION RATE: 17 BRPM | OXYGEN SATURATION: 98 %

## 2022-12-09 DIAGNOSIS — R94.39 ABNORMAL RESULT OF OTHER CARDIOVASCULAR FUNCTION STUDY: ICD-10-CM

## 2022-12-09 DIAGNOSIS — Z87.74 PERSONAL HISTORY OF (CORRECTED) CONGENITAL MALFORMATIONS OF HEART AND CIRCULATORY SYSTEM: Chronic | ICD-10-CM

## 2022-12-09 LAB
ALBUMIN SERPL ELPH-MCNC: 4.6 G/DL — SIGNIFICANT CHANGE UP (ref 3.3–5)
ALP SERPL-CCNC: 176 U/L — HIGH (ref 40–120)
ALT FLD-CCNC: 24 U/L — SIGNIFICANT CHANGE UP (ref 10–45)
ANION GAP SERPL CALC-SCNC: 15 MMOL/L — SIGNIFICANT CHANGE UP (ref 5–17)
AST SERPL-CCNC: 25 U/L — SIGNIFICANT CHANGE UP (ref 10–40)
BILIRUB SERPL-MCNC: 0.4 MG/DL — SIGNIFICANT CHANGE UP (ref 0.2–1.2)
BUN SERPL-MCNC: 20 MG/DL — SIGNIFICANT CHANGE UP (ref 7–23)
CALCIUM SERPL-MCNC: 9.8 MG/DL — SIGNIFICANT CHANGE UP (ref 8.4–10.5)
CHLORIDE SERPL-SCNC: 104 MMOL/L — SIGNIFICANT CHANGE UP (ref 96–108)
CO2 SERPL-SCNC: 23 MMOL/L — SIGNIFICANT CHANGE UP (ref 22–31)
CREAT SERPL-MCNC: 1.36 MG/DL — HIGH (ref 0.5–1.3)
EGFR: 61 ML/MIN/1.73M2 — SIGNIFICANT CHANGE UP
GLUCOSE SERPL-MCNC: 109 MG/DL — HIGH (ref 70–99)
HCT VFR BLD CALC: 48 % — SIGNIFICANT CHANGE UP (ref 39–50)
HGB BLD-MCNC: 16.3 G/DL — SIGNIFICANT CHANGE UP (ref 13–17)
MAGNESIUM SERPL-MCNC: 2.1 MG/DL — SIGNIFICANT CHANGE UP (ref 1.6–2.6)
MCHC RBC-ENTMCNC: 27.4 PG — SIGNIFICANT CHANGE UP (ref 27–34)
MCHC RBC-ENTMCNC: 34 GM/DL — SIGNIFICANT CHANGE UP (ref 32–36)
MCV RBC AUTO: 80.7 FL — SIGNIFICANT CHANGE UP (ref 80–100)
NRBC # BLD: 0 /100 WBCS — SIGNIFICANT CHANGE UP (ref 0–0)
PLATELET # BLD AUTO: 308 K/UL — SIGNIFICANT CHANGE UP (ref 150–400)
POTASSIUM SERPL-MCNC: 4 MMOL/L — SIGNIFICANT CHANGE UP (ref 3.5–5.3)
POTASSIUM SERPL-SCNC: 4 MMOL/L — SIGNIFICANT CHANGE UP (ref 3.5–5.3)
PROT SERPL-MCNC: 8 G/DL — SIGNIFICANT CHANGE UP (ref 6–8.3)
RBC # BLD: 5.95 M/UL — HIGH (ref 4.2–5.8)
RBC # FLD: 12.7 % — SIGNIFICANT CHANGE UP (ref 10.3–14.5)
SODIUM SERPL-SCNC: 142 MMOL/L — SIGNIFICANT CHANGE UP (ref 135–145)
WBC # BLD: 11.02 K/UL — HIGH (ref 3.8–10.5)
WBC # FLD AUTO: 11.02 K/UL — HIGH (ref 3.8–10.5)

## 2022-12-09 PROCEDURE — 99152 MOD SED SAME PHYS/QHP 5/>YRS: CPT

## 2022-12-09 PROCEDURE — 93005 ELECTROCARDIOGRAM TRACING: CPT

## 2022-12-09 PROCEDURE — 93454 CORONARY ARTERY ANGIO S&I: CPT | Mod: 26

## 2022-12-09 PROCEDURE — 85027 COMPLETE CBC AUTOMATED: CPT

## 2022-12-09 PROCEDURE — C1894: CPT

## 2022-12-09 PROCEDURE — C1769: CPT

## 2022-12-09 PROCEDURE — C1887: CPT

## 2022-12-09 PROCEDURE — 83735 ASSAY OF MAGNESIUM: CPT

## 2022-12-09 PROCEDURE — 93454 CORONARY ARTERY ANGIO S&I: CPT

## 2022-12-09 PROCEDURE — 93010 ELECTROCARDIOGRAM REPORT: CPT

## 2022-12-09 PROCEDURE — 80053 COMPREHEN METABOLIC PANEL: CPT

## 2022-12-09 NOTE — H&P CARDIOLOGY - HISTORY OF PRESENT ILLNESS
Patient is a 57 years old male with PMHx of HTN, HLD, bicuspid aortic valve, aortic coarctation repair as a child (age 8) who has a persistent narrowing which has beed followed closely  and been stable.  Patient presents for [facility] with Dr Miles for evaluation of his aortic stenosis.  Pt denies any fever/chills, CP/SOB, abdominal pain, n/v/d/c, arm/neck pain, dizziness, vision changes, peripheral edema, dysuria, or recent sick contacts. No implantable cardiac monitoring device implants.       CTS Dr Drake Huertas    MOLLY on 11/2022:    aortic root 4.3cm  TERRIE 0.7 cm2  AV mean gr 91.7mmHg  AV peak michaela. 4.7m/s  Ao severe stenosis  EF 53%

## 2022-12-09 NOTE — H&P CARDIOLOGY - NSICDXPASTMEDICALHX_GEN_ALL_CORE_FT
PAST MEDICAL HISTORY:  AS (aortic stenosis)     Bicuspid aortic valve     Coarctation of aorta     HLD (hyperlipidemia)     HTN (hypertension)

## 2022-12-09 NOTE — ASU DISCHARGE PLAN (ADULT/PEDIATRIC) - DRESSING DRY FT
1 Please follow-up with    [facility] outpatient program  [facility]  [address] [place] [address]  Earnestine, Supervisor: [phone]  Main #: [phone]

## 2022-12-09 NOTE — ASU DISCHARGE PLAN (ADULT/PEDIATRIC) - CARE PROVIDER_API CALL
Que Huertas)  Cardiovascular Disease  [address] [place] [address]  Phone: [phone]  Fax: [phone]  Established Patient  Follow Up Time: 2 weeks

## 2022-12-20 PROBLEM — Q25.1 COARCTATION OF AORTA: Chronic | Status: ACTIVE | Noted: 2022-12-09

## 2022-12-20 PROBLEM — I10 ESSENTIAL (PRIMARY) HYPERTENSION: Chronic | Status: ACTIVE | Noted: 2022-12-09

## 2022-12-20 PROBLEM — I35.0 NONRHEUMATIC AORTIC (VALVE) STENOSIS: Chronic | Status: ACTIVE | Noted: 2022-12-09

## 2022-12-20 PROBLEM — E78.5 HYPERLIPIDEMIA, UNSPECIFIED: Chronic | Status: ACTIVE | Noted: 2022-12-09

## 2022-12-20 PROBLEM — Q23.1 CONGENITAL INSUFFICIENCY OF AORTIC VALVE: Chronic | Status: ACTIVE | Noted: 2022-12-09

## 2023-01-13 ENCOUNTER — APPOINTMENT (OUTPATIENT)
Dept: PEDIATRIC CARDIOLOGY | Facility: CLINIC | Age: 58
End: 2023-01-13
Payer: COMMERCIAL

## 2023-01-13 VITALS
WEIGHT: 204.59 LBS | SYSTOLIC BLOOD PRESSURE: 127 MMHG | BODY MASS INDEX: 29.29 KG/M2 | HEIGHT: 70 IN | HEART RATE: 65 BPM | DIASTOLIC BLOOD PRESSURE: 76 MMHG | OXYGEN SATURATION: 96 %

## 2023-01-13 VITALS — SYSTOLIC BLOOD PRESSURE: 119 MMHG | DIASTOLIC BLOOD PRESSURE: 75 MMHG

## 2023-01-13 DIAGNOSIS — Z01.812 ENCOUNTER FOR PREPROCEDURAL LABORATORY EXAMINATION: ICD-10-CM

## 2023-01-13 PROCEDURE — 99203 OFFICE O/P NEW LOW 30 MIN: CPT | Mod: 25

## 2023-01-13 PROCEDURE — 93000 ELECTROCARDIOGRAM COMPLETE: CPT

## 2023-01-13 NOTE — REASON FOR VISIT
[Initial Consultation] : an initial consultation for [Patient] : patient [Systemic Hypertension] : systemic hypertension [Coarctation Of The Aorta] : coarctation of the aorta [Aortic Stenosis] : aortic stenosis [Bicuspid Aortic Valve] : bicuspid aortic valve

## 2023-01-13 NOTE — PHYSICAL EXAM
[General Appearance - Alert] : alert [General Appearance - In No Acute Distress] : in no acute distress [General Appearance - Well-Appearing] : well appearing [Attitude Uncooperative] : cooperative [Facies] : the head and face were normal in appearance [Examination Of The Oral Cavity] : mucous membranes were moist and pink [Respiration, Rhythm And Depth] : normal respiratory rhythm and effort [Auscultation Breath Sounds / Voice Sounds] : breath sounds clear to auscultation bilaterally [No Cough] : no cough [Normal Chest Appearance] : the chest was normal in appearance [Chest Visual Inspection Thoracic Deformity] : no chest wall deformity [Apical Impulse] : quiet precordium with normal apical impulse [Heart Rate And Rhythm] : normal heart rate and rhythm [Heart Sounds] : normal S1 and S2 [Heart Sounds Gallop] : no gallops [Heart Sounds Pericardial Friction Rub] : no pericardial rub [Heart Sounds Click] : no clicks [Arterial Pulses] : normal upper and lower extremity pulses with no pulse delay [Edema] : no edema [Capillary Refill Test] : normal capillary refill [Systolic] : systolic [III] : a grade 3/6   [Harsh] : harsh [Bowel Sounds] : normal bowel sounds [Abdomen Soft] : soft [Nondistended] : nondistended [Abdomen Tenderness] : non-tender [] : no hepato-splenomegaly [Nail Clubbing] : no clubbing  or cyanosis of the fingers

## 2023-01-13 NOTE — HISTORY OF PRESENT ILLNESS
[FreeTextEntry1] : I had the pleasure of seeing Mr. Atkins in consultation at the [facility] for recurrent coarctation of the aorta.  He was referred by Dr. Juan.  His medical history includes: CoA s/p treatment in [place] at 8 years of age with  Rakesh Arzate and Lawrence, systemic HTN, and bicuspid aortic valve with severe AoV stenosis.  He has been undergoing evaluation for SAVR/TAVR and as part of that assessment was found to have a hemodynamically significant CoA with a cath gradient of 20mmHg and narrowing to 10mm by CTA.  These findings prompted referral to me for consideration of CoA stent.\par \par His primary symptoms are slightly increased fatigued over the course of the past year but not chest pain, dyspnea, or syncope.

## 2023-01-13 NOTE — CARDIOLOGY SUMMARY
[Today's Date] : [unfilled] [FreeTextEntry1] : Sinus rhythm at a rate of 65 beats per minute with a normal MA interval. There is no evidence of atrial enlargement, ventricular hypertrophy or pre-excitation.  There is incomplete RBBB and borderline LAD.  The QTc is within normal limits with no ST or T-wave changes. [de-identified] : 03-Nov-2022 [FreeTextEntry2] : See report for details.  Briefly, severe AoV stenosis. [de-identified] : 09-Dec-2022 [FreeTextEntry3] : Adult Cath demonstrated 20mmHg CoA gradient. [de-identified] : 02-Dec2022 [de-identified] : CT reviewed - discrete CoA just distal to LSCA.

## 2023-01-13 NOTE — REVIEW OF SYSTEMS
[Nl] : Hematologic/Lymphatic [Feeling Poorly] : not feeling poorly (malaise) [Cyanosis] : no cyanosis [Edema] : no edema [Diaphoresis] : not diaphoretic [Chest Pain] : no chest pain or discomfort [Exercise Intolerance] : no persistence of exercise intolerance [Palpitations] : no palpitations [Orthopnea] : no orthopnea [Fast HR] : no tachycardia [Sleep Disturbances] : ~T no sleep disturbances

## 2023-01-13 NOTE — DISCUSSION/SUMMARY
[FreeTextEntry1] : I reviewed the cardiac imaging, medical records and reports with patient and discussed the case.  I explained to Mr. Atkins that we could offer stent angioplasty to relieve his recurrent CoA.  I explained that it made sense to do this prior to his surgical AVR due allow for better myorcardial recovery post CPB.  I discussed the risks, benefits and alternatives to the cardiac catheterization.  I reviewed risks associated with cardiac catheterization, including but not limited to bleeding, stroke, arrhythmias, kidney injury, blood transfusion, infections and death.  I quoted a <1% chance of serious procedural events (including mortality).  All questions and concerns were addressed and he wishes to proceed with the CoA stent.  We will arrange for a cardiac catheterization in January or February of 2023.  Consent was signed today.\par

## 2023-01-13 NOTE — CONSULT LETTER
[Today's Date] : [unfilled] [Name] : Name: [unfilled] [] : : ~~ [Today's Date:] : [unfilled] [Dear  ___:] : Dear Dr. [unfilled]: [Consult] : I had the pleasure of evaluating your patient, [unfilled]. My full evaluation follows. [Consult - Single Provider] : Thank you very much for allowing me to participate in the care of this patient. If you have any questions, please do not hesitate to contact me. [Sincerely,] : Sincerely, [DrParish  ___] : Dr. PENA [FreeTextEntry4] : Buster Juan MD [FreeTextEntry5] : [address]  [FreeTextEntry6] : [place] [address] [de-identified] : See note for details. [de-identified] : Santo Garza MD, MS\par Congenital Interventional Cardiologist\par Director of Pediatric Cardiac Catheterization\par [facility]\par  of Pediatrics, [facility]\par \par [facility] [place]\par [address]\[place]  [address]\par Tel: [phone]\par Fax: [phone]\par \par [email or web address]

## 2023-01-17 ENCOUNTER — APPOINTMENT (OUTPATIENT)
Dept: CARDIOTHORACIC SURGERY | Facility: CLINIC | Age: 58
End: 2023-01-17
Payer: COMMERCIAL

## 2023-01-17 VITALS
HEART RATE: 86 BPM | OXYGEN SATURATION: 96 % | RESPIRATION RATE: 14 BRPM | SYSTOLIC BLOOD PRESSURE: 136 MMHG | WEIGHT: 204 LBS | TEMPERATURE: 98 F | BODY MASS INDEX: 29.2 KG/M2 | HEIGHT: 70 IN | DIASTOLIC BLOOD PRESSURE: 87 MMHG

## 2023-01-17 DIAGNOSIS — Q23.1 CONGENITAL INSUFFICIENCY OF AORTIC VALVE: ICD-10-CM

## 2023-01-17 PROCEDURE — 99212 OFFICE O/P EST SF 10 MIN: CPT

## 2023-01-17 NOTE — PHYSICAL EXAM
[Sclera] : the sclera and conjunctiva were normal [Neck Appearance] : the appearance of the neck was normal [Jugular Venous Distention Increased] : there was no jugular-venous distention [] : no respiratory distress [Respiration, Rhythm And Depth] : normal respiratory rhythm and effort [Exaggerated Use Of Accessory Muscles For Inspiration] : no accessory muscle use [Auscultation Breath Sounds / Voice Sounds] : lungs were clear to auscultation bilaterally [Apical Impulse] : the apical impulse was normal [Heart Rate And Rhythm] : heart rate was normal and rhythm regular [Heart Sounds] : normal S1 and S2 [Bowel Sounds] : normal bowel sounds [Abdomen Soft] : soft [No CVA Tenderness] : no ~M costovertebral angle tenderness [Involuntary Movements] : no involuntary movements were seen [No Focal Deficits] : no focal deficits [Oriented To Time, Place, And Person] : oriented to person, place, and time [Impaired Insight] : insight and judgment were intact [Affect] : the affect was normal [Mood] : the mood was normal [FreeTextEntry1] : 3/6 systolic murmur

## 2023-01-17 NOTE — HISTORY OF PRESENT ILLNESS
[FreeTextEntry1] : Mr. Atkins is a 57 year old male with past history of HTN and HLD. He has a known history of Bicuspid Aortic Valve Disease, that is being referred to us By Dr. Huertas for evaluation of his Aortic Stenosis. He was seen initially in our office in November. He has a history of Aortic Coarctation repair in the past, and still has a persistent narrowing which has been followed and stable. \par \par TTE on 11/2022 showed,  TERRIE 0.7 cm2,  AV Mean Gr(mmHg) 55.0 mmHg,  AV Peak Gr(mmHg) 91.7 mmHg \par  AV Peak Awais(m/s) 4.7 m/s,  Ao Stenosis Severe, Normal LVEF\par \par Since last visit he followed up with Dr. Garza of peds cardiology last week who discussed stent angioplasty to relieve his recurrent CoA and Dean is scheduled to do so next week.  \par \par Presents today and reports overall he feels ok.  WIll get leg fatigue and SOB with walking up steps or inclines.  He feels he can walk flat without difficulty.  Working from home now, does accounting.  Denies CP, back pain,palpitations, dizziness, cough, fever or chills.

## 2023-01-17 NOTE — REVIEW OF SYSTEMS
[As noted in HPI] : as noted in HPI [As Noted in HPI] : as noted in HPI [Shortness Of Breath] : shortness of breath [SOB on Exertion] : shortness of breath during exertion [Negative] : Heme/Lymph

## 2023-01-17 NOTE — CONSULT LETTER
[Dear  ___] : Dear  [unfilled], [Courtesy Letter:] : I had the pleasure of seeing your patient, [unfilled], in my office today. [Please see my note below.] : Please see my note below. [Sincerely,] : Sincerely, [FreeTextEntry2] : Dr. Huertas [FreeTextEntry3] : Buster Juan MD

## 2023-01-17 NOTE — END OF VISIT
[FreeTextEntry3] : I, Dr. Buster Juan, personally performed the evaluation and management (E/M) services for this established patient who presents today with (a) new problem(s)/exacerbation of (an) existing condition(s).  That E/M includes conducting the examination, assessing all new/exacerbated conditions, and establishing a new plan of care.  Today, the ACP, Mingo Artis NP, was here to observe my evaluation and management services for this new problem/exacerbated condition to be followed going forward

## 2023-01-17 NOTE — ASSESSMENT
[FreeTextEntry1] : I reviewed the cardiac imaging, medical records and reports with patient and discussed the case.  I discussed the risks, benefits and alternatives to both surgical aortic valve replacement (SAVR) and Transcatheter Aortic Valve Replacement (TAVR). Given his bicuspid AV with dilated aortic root of 4.6cm on CT from 11/22/2022  I also discussed the likelihood that he will require bentall procedure to address the dilated root and AV.  Risks included but not limited to bleeding, stroke, Myocardial Infarction, kidney problems, blood transfusion, permanent  pacemaker implantation, infections and death. I also discussed the various approaches in detail.  I feel that the patient will benefit and is a candidate for surgical aortic valve replacement, possible bentall one month after stent angioplasty.  I quoted a 2-3% risk. All questions and concerns were addressed and patient agrees to proceed with surgery.\par \par Plan:\par \par - Surgical AVR, possible bentall procedure\par - PST prior to surgery\par - Call with any questions or concerns

## 2023-01-18 ENCOUNTER — OUTPATIENT (OUTPATIENT)
Dept: OUTPATIENT SERVICES | Facility: HOSPITAL | Age: 58
LOS: 1 days | End: 2023-01-18

## 2023-01-18 VITALS
WEIGHT: 201.94 LBS | HEIGHT: 69.5 IN | TEMPERATURE: 97 F | HEART RATE: 84 BPM | DIASTOLIC BLOOD PRESSURE: 86 MMHG | OXYGEN SATURATION: 97 % | RESPIRATION RATE: 15 BRPM | SYSTOLIC BLOOD PRESSURE: 126 MMHG

## 2023-01-18 DIAGNOSIS — Z92.89 PERSONAL HISTORY OF OTHER MEDICAL TREATMENT: Chronic | ICD-10-CM

## 2023-01-18 DIAGNOSIS — Z98.890 OTHER SPECIFIED POSTPROCEDURAL STATES: Chronic | ICD-10-CM

## 2023-01-18 DIAGNOSIS — Z87.74 PERSONAL HISTORY OF (CORRECTED) CONGENITAL MALFORMATIONS OF HEART AND CIRCULATORY SYSTEM: Chronic | ICD-10-CM

## 2023-01-18 DIAGNOSIS — Q23.0 CONGENITAL STENOSIS OF AORTIC VALVE: ICD-10-CM

## 2023-01-18 DIAGNOSIS — I10 ESSENTIAL (PRIMARY) HYPERTENSION: ICD-10-CM

## 2023-01-18 DIAGNOSIS — Q25.1 COARCTATION OF AORTA: ICD-10-CM

## 2023-01-18 DIAGNOSIS — Z91.89 OTHER SPECIFIED PERSONAL RISK FACTORS, NOT ELSEWHERE CLASSIFIED: ICD-10-CM

## 2023-01-18 LAB
ALBUMIN SERPL ELPH-MCNC: 4.7 G/DL — SIGNIFICANT CHANGE UP (ref 3.3–5)
ALP SERPL-CCNC: 178 U/L — HIGH (ref 40–120)
ALT FLD-CCNC: 25 U/L — SIGNIFICANT CHANGE UP (ref 4–41)
ANION GAP SERPL CALC-SCNC: 15 MMOL/L — HIGH (ref 7–14)
AST SERPL-CCNC: 24 U/L — SIGNIFICANT CHANGE UP (ref 4–40)
BILIRUB SERPL-MCNC: 0.4 MG/DL — SIGNIFICANT CHANGE UP (ref 0.2–1.2)
BLD GP AB SCN SERPL QL: NEGATIVE — SIGNIFICANT CHANGE UP
BUN SERPL-MCNC: 16 MG/DL — SIGNIFICANT CHANGE UP (ref 7–23)
CALCIUM SERPL-MCNC: 9.8 MG/DL — SIGNIFICANT CHANGE UP (ref 8.4–10.5)
CHLORIDE SERPL-SCNC: 102 MMOL/L — SIGNIFICANT CHANGE UP (ref 98–107)
CO2 SERPL-SCNC: 24 MMOL/L — SIGNIFICANT CHANGE UP (ref 22–31)
CREAT SERPL-MCNC: 1.23 MG/DL — SIGNIFICANT CHANGE UP (ref 0.5–1.3)
EGFR: 69 ML/MIN/1.73M2 — SIGNIFICANT CHANGE UP
GLUCOSE SERPL-MCNC: 89 MG/DL — SIGNIFICANT CHANGE UP (ref 70–99)
HCT VFR BLD CALC: 46.7 % — SIGNIFICANT CHANGE UP (ref 39–50)
HGB BLD-MCNC: 15.5 G/DL — SIGNIFICANT CHANGE UP (ref 13–17)
MCHC RBC-ENTMCNC: 26.4 PG — LOW (ref 27–34)
MCHC RBC-ENTMCNC: 33.2 GM/DL — SIGNIFICANT CHANGE UP (ref 32–36)
MCV RBC AUTO: 79.6 FL — LOW (ref 80–100)
NRBC # BLD: 0 /100 WBCS — SIGNIFICANT CHANGE UP (ref 0–0)
NRBC # FLD: 0 K/UL — SIGNIFICANT CHANGE UP (ref 0–0)
PLATELET # BLD AUTO: 296 K/UL — SIGNIFICANT CHANGE UP (ref 150–400)
POTASSIUM SERPL-MCNC: 3.7 MMOL/L — SIGNIFICANT CHANGE UP (ref 3.5–5.3)
POTASSIUM SERPL-SCNC: 3.7 MMOL/L — SIGNIFICANT CHANGE UP (ref 3.5–5.3)
PROT SERPL-MCNC: 7.5 G/DL — SIGNIFICANT CHANGE UP (ref 6–8.3)
RBC # BLD: 5.87 M/UL — HIGH (ref 4.2–5.8)
RBC # FLD: 12.4 % — SIGNIFICANT CHANGE UP (ref 10.3–14.5)
RH IG SCN BLD-IMP: POSITIVE — SIGNIFICANT CHANGE UP
SODIUM SERPL-SCNC: 141 MMOL/L — SIGNIFICANT CHANGE UP (ref 135–145)
WBC # BLD: 10.28 K/UL — SIGNIFICANT CHANGE UP (ref 3.8–10.5)
WBC # FLD AUTO: 10.28 K/UL — SIGNIFICANT CHANGE UP (ref 3.8–10.5)

## 2023-01-18 NOTE — H&P PST ADULT - HISTORY OF PRESENT ILLNESS
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 Patient with systemic HTN, evaluated by cardiothoracic surgeon for Surgical aortic valve replacement and possible Bentall one month after stent angioplasty for recurrent coarctation of Aorta. 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 Patient with systemic HTN, evaluated by cardiothoracic surgeon for Surgical aortic valve replacement and possible Bentall one month after stent angioplasty for recurrent coarctation of Aorta.

## 2023-01-18 NOTE — H&P PST ADULT - PROBLEM SELECTOR PLAN 3
MINAL precautions  complete visualization of uvula- class 2- Mallampati  denies loose teeth or dentures

## 2023-01-18 NOTE — H&P PST ADULT - PROBLEM SELECTOR PLAN 1
Patient is tentatively  scheduled for procedure- cardiac cath and CoA stent with Dr Garza on 01/24/2023.    Pre-op instructions provided. Pt given verbal and written instructions with teach back on patients own lansoprazole . Pt verbalized understanding with return demonstration.  Patient was given instructions from cardiologist office regarding pre op instructions.    Routine Covid PCR test ordered .Instructions regarding covid PCR test and locations for covid testing site provided. Pt verbalized understanding. Patient reports his covid test was scheduled by cardiology office for 01/23/2023.    CBC, CMP, T&S sent.    EKG and ECHO results in chart.

## 2023-01-18 NOTE — H&P PST ADULT - CARDIOVASCULAR COMMENTS
patient reports that he had congenital heart disease- s/p aorta repair as a child/ patient reports of LOREDO pre op diagnosis recurrent coarctation of aorta/ METs <4- walks, ADLs, climb stairs with LOREDO

## 2023-01-18 NOTE — H&P PST ADULT - NSANTHOSAYNRD_GEN_A_CORE
No. MINAL screening performed.  STOP BANG Legend: 0-2 = LOW Risk; 3-4 = INTERMEDIATE Risk; 5-8 = HIGH Risk

## 2023-01-18 NOTE — H&P PST ADULT - NS PRO PASSIVE SMOKE EXP
3.5 Mm Punch Excision Text: A 3.5 mm punch biopsy was used to excise the lesion to the level of the subcutaneous fat.  Blunt dissection was used to free the lesion from the surrounding tissues and the lesion was removed. No

## 2023-01-18 NOTE — H&P PST ADULT - ASSESSMENT
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 Patient with systemic HTN, evaluated by cardiothoracic surgeon for Surgical aortic valve replacement and possible Bentall one month after stent angioplasty for recurrent coarctation of Aorta.   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  Patient with systemic HTN, evaluated by cardiothoracic surgeon for Surgical aortic valve replacement and possible Bentall one month after stent angioplasty for recurrent coarctation of Aorta.

## 2023-01-24 ENCOUNTER — INPATIENT (INPATIENT)
Facility: HOSPITAL | Age: 58
LOS: 0 days | Discharge: HOME | End: 2023-01-25
Attending: STUDENT IN AN ORGANIZED HEALTH CARE EDUCATION/TRAINING PROGRAM | Admitting: STUDENT IN AN ORGANIZED HEALTH CARE EDUCATION/TRAINING PROGRAM
Payer: COMMERCIAL

## 2023-01-24 VITALS
HEART RATE: 73 BPM | OXYGEN SATURATION: 95 % | DIASTOLIC BLOOD PRESSURE: 68 MMHG | SYSTOLIC BLOOD PRESSURE: 120 MMHG | RESPIRATION RATE: 21 BRPM

## 2023-01-24 DIAGNOSIS — Z87.74 PERSONAL HISTORY OF (CORRECTED) CONGENITAL MALFORMATIONS OF HEART AND CIRCULATORY SYSTEM: Chronic | ICD-10-CM

## 2023-01-24 DIAGNOSIS — Q23.0 CONGENITAL STENOSIS OF AORTIC VALVE: ICD-10-CM

## 2023-01-24 DIAGNOSIS — Z92.89 PERSONAL HISTORY OF OTHER MEDICAL TREATMENT: Chronic | ICD-10-CM

## 2023-01-24 PROCEDURE — 75820 VEIN X-RAY ARM/LEG: CPT | Mod: 26,59

## 2023-01-24 PROCEDURE — 93567 NJX CAR CTH SPRVLV AORTGRPHY: CPT

## 2023-01-24 PROCEDURE — 93010 ELECTROCARDIOGRAM REPORT: CPT

## 2023-01-24 PROCEDURE — 93596 R&L HRT CATH CHD NML NT CNJ: CPT | Mod: 26

## 2023-01-24 PROCEDURE — 71045 X-RAY EXAM CHEST 1 VIEW: CPT | Mod: 26

## 2023-01-24 PROCEDURE — 76937 US GUIDE VASCULAR ACCESS: CPT | Mod: 26,59

## 2023-01-24 PROCEDURE — 37236 OPEN/PERQ PLACE STENT 1ST: CPT

## 2023-01-24 RX ORDER — CHLORHEXIDINE GLUCONATE 213 G/1000ML
1 SOLUTION TOPICAL
Refills: 0 | Status: DISCONTINUED | OUTPATIENT
Start: 2023-01-24 | End: 2023-01-25

## 2023-01-24 RX ORDER — SODIUM CHLORIDE 9 MG/ML
3 INJECTION INTRAMUSCULAR; INTRAVENOUS; SUBCUTANEOUS EVERY 8 HOURS
Refills: 0 | Status: DISCONTINUED | OUTPATIENT
Start: 2023-01-24 | End: 2023-01-24

## 2023-01-24 RX ORDER — CEFAZOLIN SODIUM 1 G
VIAL (EA) INJECTION
Refills: 0 | Status: COMPLETED | OUTPATIENT
Start: 2023-01-24 | End: 2023-01-25

## 2023-01-24 RX ORDER — CEFAZOLIN SODIUM 1 G
1000 VIAL (EA) INJECTION EVERY 8 HOURS
Refills: 0 | Status: COMPLETED | OUTPATIENT
Start: 2023-01-24 | End: 2023-01-25

## 2023-01-24 RX ORDER — ACETAMINOPHEN 500 MG
1000 TABLET ORAL ONCE
Refills: 0 | Status: COMPLETED | OUTPATIENT
Start: 2023-01-25 | End: 2023-01-24

## 2023-01-24 RX ORDER — ACETAMINOPHEN 500 MG
1000 TABLET ORAL ONCE
Refills: 0 | Status: DISCONTINUED | OUTPATIENT
Start: 2023-01-24 | End: 2023-01-24

## 2023-01-24 RX ORDER — BNT162B2 0.23 MG/2.25ML
0.3 INJECTION, SUSPENSION INTRAMUSCULAR ONCE
Refills: 0 | Status: DISCONTINUED | OUTPATIENT
Start: 2023-01-24 | End: 2023-01-25

## 2023-01-24 RX ORDER — FENTANYL CITRATE 50 UG/ML
25 INJECTION INTRAVENOUS ONCE
Refills: 0 | Status: DISCONTINUED | OUTPATIENT
Start: 2023-01-24 | End: 2023-01-24

## 2023-01-24 RX ORDER — PANTOPRAZOLE SODIUM 20 MG/1
40 TABLET, DELAYED RELEASE ORAL
Refills: 0 | Status: DISCONTINUED | OUTPATIENT
Start: 2023-01-24 | End: 2023-01-25

## 2023-01-24 RX ORDER — CEFAZOLIN SODIUM 1 G
1000 VIAL (EA) INJECTION ONCE
Refills: 0 | Status: COMPLETED | OUTPATIENT
Start: 2023-01-24 | End: 2023-01-24

## 2023-01-24 RX ORDER — HYDROMORPHONE HYDROCHLORIDE 2 MG/ML
0.25 INJECTION INTRAMUSCULAR; INTRAVENOUS; SUBCUTANEOUS ONCE
Refills: 0 | Status: DISCONTINUED | OUTPATIENT
Start: 2023-01-24 | End: 2023-01-24

## 2023-01-24 RX ADMIN — FENTANYL CITRATE 25 MICROGRAM(S): 50 INJECTION INTRAVENOUS at 16:30

## 2023-01-24 RX ADMIN — Medication 1000 MILLIGRAM(S): at 23:59

## 2023-01-24 RX ADMIN — Medication 400 MILLIGRAM(S): at 23:29

## 2023-01-24 RX ADMIN — PANTOPRAZOLE SODIUM 40 MILLIGRAM(S): 20 TABLET, DELAYED RELEASE ORAL at 18:55

## 2023-01-24 RX ADMIN — SODIUM CHLORIDE 3 MILLILITER(S): 9 INJECTION INTRAMUSCULAR; INTRAVENOUS; SUBCUTANEOUS at 14:33

## 2023-01-24 RX ADMIN — Medication 100 MILLIGRAM(S): at 23:04

## 2023-01-24 RX ADMIN — Medication 100 MILLIGRAM(S): at 15:51

## 2023-01-24 RX ADMIN — FENTANYL CITRATE 25 MICROGRAM(S): 50 INJECTION INTRAVENOUS at 16:09

## 2023-01-24 NOTE — PATIENT PROFILE ADULT - FALL HARM RISK - UNIVERSAL INTERVENTIONS
Bed in lowest position, wheels locked, appropriate side rails in place/Call bell, personal items and telephone in reach/Instruct patient to call for assistance before getting out of bed or chair/Non-slip footwear when patient is out of bed/[place]ur[place] to call system/Physically safe environment - no spills, clutter or unnecessary equipment/Purposeful Proactive Rounding/Room/bathroom lighting operational, light cord in reach

## 2023-01-24 NOTE — PATIENT PROFILE ADULT - FUNCTIONAL ASSESSMENT - BASIC MOBILITY ASSESSMENT TYPE
Admission

## 2023-01-24 NOTE — PROCEDURE NOTE - SUPERVISORY STATEMENT
Cardiac catheterization and placement of stent in aortic arch performed with excellent results. Full report to follow.

## 2023-01-24 NOTE — PROCEDURE NOTE - ADDITIONAL PROCEDURE DETAILS
Access: *** Fr RFV and *** Fr RFA w US guidance.    Pre;iminary findings  Saturations (%):   SVC/MV: ***  PA: ***  Travis:    Qp: *** mL/min/m2  Qs: *** mL/min/m2  Qp:Qs: ***:***    Pressures (mmHg):   SVC/RA: ***  RV: ***  PA: ***  LV: ***  Travis: ***    Rp: *** iWu    Angiography/Interventions (Key findings):  ***    Assessment: *** is a *** M/F with ***.  He/She underwent invasive assessment today which demonstrated ***.      Plan:  -Chest xray today  -Echo tomorrow Access:  7Fr RFV and 6Fr-->14Fr Fr RFA w US guidance.    Preliminary findings  Saturations (%):   SVC/MV: 75%  PA: 77%  Travis: 98%    Qp: 4.7  mL/min/m2  Qs: 4.7  mL/min/m2  Qp:Qs: 1:1    Pressures (mmHg):   SVC/RA: 6  RV: 26/6  RPCW: 6  PA: 24/9 (13)  RPA: 24/9 (13)  AAo: 85/47 (62)  Travis: 79/50 (62)    Rp: 1.9 iWu    Angiography/Interventions (Key findings):  Angiography today demonstrated narrowing of the thoracic aorta distal to the origin of the left subclavian artery. There was a 25-30 mmhg gradient between the ascending and descending aorta. A 36mm by 12mm MAX LD stent was placed at the level of the narrowing with improved angiographic appearance and a 5mmhg gradient post-intervention.     Assessment: Dean is a 58 yo M  with PMH of HTN, HLD, Bicuspid aortic valve disease, Coarctation of Aorta, s/p coarctation repair @ 8 years of age now with re-coarctation of aorta.  He underwent invasive assessment today which demonstrated re-coarcation of the thoracic aorta.      Plan:  -Lie flat with legs straight until 3pm  -HOB to 30 degrees from 3-5 pm  -Can ambulate at 5pm  - Patient will need 2 more doses of antibiotics to complete 24 hours of antibiotics  -Chest xray today  -Echo tomorrow   - Re-start anti-hypertension medication (Amlodipine) today Access:  7Fr RFV and 6Fr-->14Fr Fr RFA w US guidance.    Preliminary findings  Baseline  Saturations (%):   SVC/MV: 75%  PA: 77%  Travis: 98%    Qp: 4.7  mL/min/m2  Qs: 4.7  mL/min/m2  Qp:Qs: 1:1    Pressures (mmHg):   SVC/RA: 6  RV: 26/6  RPCW: 6  PA: 24/9 (13)  RPA: 24/9 (13)  AAo: 85/47 (62)  Travis: 79/50 (62)    Rp: 1.9 iWu    Condition #2 (Dobutamine @ 10 mcg/kg/min)  Pressures (mmHg):   AAo: 132/62 (88)  Travis: 105/63 (82)  /60    Post-intervention  Pressures (mmHg):   AAo: 113/66 (85)  Travis: 107/63 (81)    Angiography/Interventions (Key findings):  Angiography today demonstrated narrowing of the thoracic aorta distal to the origin of the left subclavian artery. There was a 25-30 mmhg gradient between the ascending and descending aorta. A 36mm by 12mm MAX LD stent was placed at the level of the narrowing with improved angiographic appearance and a 5mmhg gradient post-intervention.     Assessment: Dean is a 56 yo M  with PMH of HTN, HLD, Bicuspid aortic valve disease, Coarctation of Aorta, s/p coarctation repair @ 8 years of age now with re-coarctation of aorta.  He underwent invasive assessment today which demonstrated recoarctation of the thoracic aorta.      Plan:  -Lie flat with legs straight until 3pm  -HOB to 30 degrees from 3-5 pm  -Can ambulate at 5pm  - Patient will need 2 more doses of antibiotics to complete 24 hours of antibiotics  -Chest xray today  -Echo tomorrow   - Re-start anti-hypertension medication (Amlodipine) today Access:  7Fr RFV and 6Fr-->14Fr Fr RFA w US guidance.    Preliminary findings  Baseline  Saturations (%):   SVC/MV: 75%  PA: 77%  Travis: 98%    Qp: 4.7  mL/min/m2  Qs: 4.7  mL/min/m2  Qp:Qs: 1:1    Pressures (mmHg):   SVC/RA: 6  RV: 26/6  RPCW: 6  PA: 24/9 (15)  RPA: 24/9 (15)  AAo: 85/47 (62)  Travis: 79/50 (62)    Rp: 1.9 iWu    Condition #2 (Dobutamine @ 10 mcg/kg/min)  Pressures (mmHg):   AAo: 132/62 (88)  Travis: 105/63 (82)  /60    Post-intervention  Pressures (mmHg):   AAo: 113/66 (85)  Travis: 107/63 (81)    Angiography/Interventions (Key findings):  Angiography today demonstrated narrowing of the thoracic aorta distal to the origin of the left subclavian artery. There was a 25-30 mmhg gradient between the ascending and descending aorta. A 36mm by 12mm MAX LD stent was placed at the level of the narrowing with improved angiographic appearance and a 5mmhg gradient post-intervention.     Assessment: Dean is a 56 yo M  with PMH of HTN, HLD, Bicuspid aortic valve disease, Coarctation of Aorta, s/p coarctation repair @ 8 years of age now with re-coarctation of aorta.  He underwent invasive assessment today which demonstrated recoarctation of the thoracic aorta.      Plan:  -Lie flat with legs straight until 3pm  -HOB to 30 degrees from 3-5 pm  -Can ambulate at 5pm  - Patient will need 2 more doses of antibiotics to complete 24 hours of antibiotics  -Chest xray today  -Echo tomorrow   - Re-start anti-hypertension medication (Amlodipine) today Access:  7Fr RFV and 6Fr-->14Fr Fr RFA w US guidance.    Preliminary findings  Baseline  Saturations (%):   SVC/MV: 75%  PA: 77%  Travis: 98%    Qp: 4.7  mL/min/m2  Qs: 4.7  mL/min/m2  Qp:Qs: 1:1    Pressures (mmHg):   SVC/RA: 6  RV: 26/6  RPCW: 6  PA: 24/9 (15)  RPA: 24/9 (15)  AAo: 85/47 (62)  Travis: 79/50 (62)    Rp: 1.9 iWu    Condition #2 (Dobutamine @ 10 mcg/kg/min)  Pressures (mmHg):   AAo: 132/62 (88)  Travis: 105/63 (82)  /60    Post-intervention  Pressures (mmHg):   AAo: 113/66 (85)  Travis: 107/63 (81)    Angiography/Interventions (Key findings):  Angiography today demonstrated narrowing of the thoracic aorta distal to the origin of the left subclavian artery. There was a 25-30 mmhg gradient between the ascending and descending aorta. A 36mm by 12mm MAX LD stent was placed at the level of the narrowing with improved angiographic appearance and a 5mmhg gradient post-intervention.     Assessment: Dean is a 56 yo M  with PMH of HTN, HLD, Bicuspid aortic valve disease, Coarctation of Aorta, s/p coarctation repair @ 8 years of age now with re-coarctation of aorta.  He underwent invasive assessment today which demonstrated recoarctation of the thoracic aorta.      Plan:  -Lie flat with legs straight until 3pm  -HOB to 30 degrees from 3-5 pm  -Can ambulate at 5pm  - Patient will need 2 more doses of antibiotics to complete 24 hours of antibiotics  -Chest xray today  -Echo tomorrow   - Re-start anti-hypertension medication (Amlodipine) today  -Follow up with Dr Garza in 2-3 weeks

## 2023-01-24 NOTE — CHART NOTE - NSCHARTNOTEFT_GEN_A_CORE
REASON FOR CCU:  Adult Congenital patient needs close monitoring post aortic stent procedure     HISTORY OF PRESENT ILLNESS:  Patient is a 57y old male who presents to [facility] for CoA stent with Dr. Garza  57 year old male with PMH of HTN, HLD, Bicuspid aortic valve disease, Coarctation of Aorta, s/p aorta coarctation repair @ 8 years of age- presents to [facility], with pre op diagnosis of congenital stenosis of aorta, presented to [facility] for cardiac cath and CoA stent with Dr Garza.  Patient with systemic HTN, evaluated by cardiothoracic surgeon for Surgical aortic valve replacement and possible Bentall one month after stent angioplasty for recurrent coarctation of Aorta. Patient tolerated the procedure well. Post operative monitoring to be done in the CCU.     Allergies    No Known Allergies    Intolerances        PAST MEDICAL & SURGICAL HISTORY:  Coarctation of aorta  HTN (hypertension)  HLD (hyperlipidemia)  Bicuspid aortic valve  AS (aortic stenosis)  History of aortic coarctation repair  H/O angiography      MEDICATIONS  (STANDING):  ceFAZolin   IVPB      chlorhexidine 2% Cloths 1 Application(s) Topical <User Schedule>  HYDROmorphone   Tablet 0.25 milliGRAM(s) Oral once  sodium chloride 0.9% lock flush 3 milliLiter(s) IV Push every 8 hours    MEDICATIONS  (PRN):      Drug Dosing Weight  Height (cm): 177.8 (24 Jan 2023 14:27)  Weight (kg): 91.6 (24 Jan 2023 14:27)  BMI (kg/m2): 29 (24 Jan 2023 14:27)  BSA (m2): 2.1 (24 Jan 2023 14:27)    FAMILY HISTORY:      ADVANCE DIRECTIVES:    CONSTITUTIONAL: No fevers, No chills, No fatigue, No weight gain  EYES: No vision changes   ENT: No congestion, No ear pain, No sore throat.  NECK: No pain, No stiffness  RESPIRATORY: No shortness of breath, No cough, No wheezing, No hemoptysis  CARDIOVASCULAR: No chest pain. No palpitations, No LOREDO, No orthopnea, No paroxysmal nocturnal dyspnea, No pleuritic pain  GASTROINTESTINAL: No abdominal pain, No nausea, No vomiting, No hematemesis, No diarrhea No constipation. No melena  GENITOURINARY: No dysuria, No frequency, No incontinence, No hematuria  NEUROLOGICAL: No dizziness, No lightheadedness, No syncope, No LOC, No headache, No numbness or weakness  MUSCULOSKELETAL: No Edema, No joint pain, No joint swelling.  PSYCHIATRIC: No anxiety, No depression  DERMATOLOGY: No diaphoresis. No itching, No rashes, No pressure ulcers  HEME/LYMPH: No easy bruising, or bleeding gums    All other review of systems is negative unless indicated above.    Appearance: NAD, no distress  HEENT: Moist Mucous Membranes, Anicteric, PERRL, EOMI  Cardiovascular: Regular rate and rhythm, Normal S1 S2, No JVD, No murmurs  Respiratory: Lungs clear to auscultation. No rales, No rhonchi, No wheezing. No tenderness to palpation  Gastrointestinal:  Soft, Non-tender, + BS  Neurologic: Non-focal, A&Ox3  Skin: Warm and dry, No rashes, No ecchymosis, No cyanosis  Musculoskeletal: No clubbing, No cyanosis, No edema  Vascular: Peripheral pulses palpable 2+ bilaterally  Psychiatry: Mood & affect appropriate    ICU Vital Signs Last 24 Hrs  T(C): --  T(F): --  HR: 85 (24 Jan 2023 13:27) (73 - 85)  BP: 87/57 (24 Jan 2023 13:27) (87/57 - 120/68)  BP(mean): 65 (24 Jan 2023 13:27) (65 - 80)  ABP: --  ABP(mean): --  RR: 18 (24 Jan 2023 13:27) (18 - 21)  SpO2: 97% (24 Jan 2023 13:27) (95% - 97%)    O2 Parameters below as of 24 Jan 2023 13:27  Patient On (Oxygen Delivery Method): room air                LABS:              CAPILLARY BLOOD GLUCOSE              I&O's Detail      EKG:    ECHO      RADIOLOGY STUDIES    CXR:     CT SCAN:     ULTRASOUND:     ASSESSMENT      PLAN          Case discussed with Cardiology fellow REASON FOR CCU:  Adult Congenital patient needs close monitoring post aortic stent procedure     HISTORY OF PRESENT ILLNESS:  Patient is a 57y old male who presents to [facility] for CoA stent with Dr. Garza  57 year old male with PMH of HTN, HLD, Bicuspid aortic valve disease, Coarctation of Aorta, s/p aorta coarctation repair @ 8 years of age- presents to [facility], with pre op diagnosis of congenital stenosis of aorta, presented to [facility] for cardiac cath and CoA stent with Dr aGrza.  Patient with systemic HTN, evaluated by cardiothoracic surgeon for Surgical aortic valve replacement and possible Bentall one month after stent angioplasty for recurrent coarctation of Aorta. Patient tolerated the procedure well. Post operative monitoring to be done in the CCU.     NKDA    PAST MEDICAL & SURGICAL HISTORY:  Coarctation of aorta  HTN (hypertension)  HLD (hyperlipidemia)  Bicuspid aortic valve  AS (aortic stenosis)  History of aortic coarctation repair  H/O angiography      MEDICATIONS  (STANDING):  ceFAZolin   IVPB      chlorhexidine 2% Cloths 1 Application(s) Topical <User Schedule>  HYDROmorphone   Tablet 0.25 milliGRAM(s) Oral once  sodium chloride 0.9% lock flush 3 milliLiter(s) IV Push every 8 hours    MEDICATIONS  (PRN):      Drug Dosing Weight  Height (cm): 177.8 (24 Jan 2023 14:27)  Weight (kg): 91.6 (24 Jan 2023 14:27)  BMI (kg/m2): 29 (24 Jan 2023 14:27)  BSA (m2): 2.1 (24 Jan 2023 14:27)    FAMILY HISTORY:      ADVANCE DIRECTIVES:    CONSTITUTIONAL: No fevers, No chills, No fatigue, No weight gain  EYES: No vision changes   ENT: No congestion, No ear pain, No sore throat.  NECK: No pain, No stiffness  RESPIRATORY: No shortness of breath, No cough, No wheezing, No hemoptysis  CARDIOVASCULAR: No chest pain. No palpitations, No LOREDO, No orthopnea, No paroxysmal nocturnal dyspnea, No pleuritic pain  GASTROINTESTINAL: No abdominal pain, No nausea, No vomiting, No hematemesis, No diarrhea No constipation. No melena  GENITOURINARY: No dysuria, No frequency, No incontinence, No hematuria  NEUROLOGICAL: No dizziness, No lightheadedness, No syncope, No LOC, No headache, No numbness or weakness  MUSCULOSKELETAL: No Edema, No joint pain, No joint swelling.  PSYCHIATRIC: No anxiety, No depression  DERMATOLOGY: No diaphoresis. No itching, No rashes, No pressure ulcers  HEME/LYMPH: No easy bruising, or bleeding gums    All other review of systems is negative unless indicated above.    Appearance: NAD, no distress  HEENT: Moist Mucous Membranes, Anicteric, PERRL, EOMI  Cardiovascular: Regular rate and rhythm, Normal S1 S2, No JVD, No murmurs  Respiratory: Lungs clear to auscultation. No rales, No rhonchi, No wheezing. No tenderness to palpation  Gastrointestinal:  Soft, Non-tender, + BS  Neurologic: Non-focal, A&Ox3  Skin: Warm and dry, No rashes, No ecchymosis, No cyanosis  Musculoskeletal: No clubbing, No cyanosis, No edema  Vascular: Peripheral pulses palpable 2+ bilaterally  Psychiatry: Mood & affect appropriate    ICU Vital Signs Last 24 Hrs  T(C): --  T(F): --  HR: 85 (24 Jan 2023 13:27) (73 - 85)  BP: 87/57 (24 Jan 2023 13:27) (87/57 - 120/68)  BP(mean): 65 (24 Jan 2023 13:27) (65 - 80)  ABP: --  ABP(mean): --  RR: 18 (24 Jan 2023 13:27) (18 - 21)  SpO2: 97% (24 Jan 2023 13:27) (95% - 97%)    O2 Parameters below as of 24 Jan 2023 13:27  Patient On (Oxygen Delivery Method): room air    ASSESSMENT AND PLAN:    57 year old male with PMH of HTN, HLD, Bicuspid aortic valve disease, Coarctation of Aorta, s/p aorta coarctation repair @ 8 years of age- presents to [facility], with pre op diagnosis of congenital stenosis of aorta, presented to [facility] for cardiac cath and CoA stent with Dr Garza.  Patient with systemic HTN, evaluated by cardiothoracic surgeon for Surgical aortic valve replacement and possible Bentall one month after stent angioplasty for recurrent coarctation of Aorta. Patient tolerated the procedure well. Post operative monitoring to be done in the CCU.     PLAN:  Admit to CCU for continuous tele monitoring   Monitor right groin perclose site  left radial a-line transduced, can be discontinued in the am  EKG 12 lead  CMP, CBC, ordered  Supplemental O2 as needed, titrate to off  Resumed diet, tolerating clears at bedside  hold antihypertensive medications  ancef 1 gram x 2 more doses  CXR   pain management- tylenol  1 gram given at 1pm  Dilaudid 0.25 tablet ordered x1 in CCU for pain  Dr. Garza to be notified for any changes in condition REASON FOR CCU:  Adult Congenital patient needs close monitoring post aortic stent procedure     HISTORY OF PRESENT ILLNESS:  Patient is a 57y old male who presents to [facility] for CoA stent with Dr. Garza  57 year old male with PMH of HTN, HLD, Bicuspid aortic valve disease, Coarctation of Aorta, s/p aorta coarctation repair @ 8 years of age- presents to [facility], with pre op diagnosis of congenital stenosis of aorta, presented to [facility] for cardiac cath and CoA stent with Dr Garza.  Patient with systemic HTN, evaluated by cardiothoracic surgeon for Surgical aortic valve replacement and possible Bentall one month after stent angioplasty for recurrent coarctation of Aorta. Patient tolerated the procedure well. Post operative monitoring to be done in the CCU.     NKDA    PAST MEDICAL & SURGICAL HISTORY:  Coarctation of aorta  HTN (hypertension)  HLD (hyperlipidemia)  Bicuspid aortic valve  AS (aortic stenosis)  History of aortic coarctation repair  H/O angiography      MEDICATIONS  (STANDING):  ceFAZolin   IVPB      chlorhexidine 2% Cloths 1 Application(s) Topical <User Schedule>  HYDROmorphone   Tablet 0.25 milliGRAM(s) Oral once  sodium chloride 0.9% lock flush 3 milliLiter(s) IV Push every 8 hours    MEDICATIONS  (PRN):      Drug Dosing Weight  Height (cm): 177.8 (24 Jan 2023 14:27)  Weight (kg): 91.6 (24 Jan 2023 14:27)  BMI (kg/m2): 29 (24 Jan 2023 14:27)  BSA (m2): 2.1 (24 Jan 2023 14:27)    FAMILY HISTORY:      ADVANCE DIRECTIVES:    CONSTITUTIONAL: No fevers, No chills, No fatigue, No weight gain  EYES: No vision changes   ENT: No congestion, No ear pain, No sore throat.  NECK: No pain, No stiffness  RESPIRATORY: No shortness of breath, No cough, No wheezing, No hemoptysis  CARDIOVASCULAR: No chest pain. No palpitations, No LOREDO, No orthopnea, No paroxysmal nocturnal dyspnea, No pleuritic pain  GASTROINTESTINAL: No abdominal pain, No nausea, No vomiting, No hematemesis, No diarrhea No constipation. No melena  GENITOURINARY: No dysuria, No frequency, No incontinence, No hematuria  NEUROLOGICAL: No dizziness, No lightheadedness, No syncope, No LOC, No headache, No numbness or weakness  MUSCULOSKELETAL: No Edema, No joint pain, No joint swelling.  PSYCHIATRIC: No anxiety, No depression  DERMATOLOGY: No diaphoresis. No itching, No rashes, No pressure ulcers  HEME/LYMPH: No easy bruising, or bleeding gums    All other review of systems is negative unless indicated above.    Appearance: NAD, no distress  HEENT: Moist Mucous Membranes, Anicteric, PERRL, EOMI  Cardiovascular: Regular rate and rhythm, Normal S1 S2, No JVD, No murmurs  Respiratory: Lungs clear to auscultation. No rales, No rhonchi, No wheezing. No tenderness to palpation  Gastrointestinal:  Soft, Non-tender, + BS  Neurologic: Non-focal, A&Ox3  Skin: Warm and dry, No rashes, No ecchymosis, No cyanosis  Musculoskeletal: No clubbing, No cyanosis, No edema  Vascular: Peripheral pulses palpable 2+ bilaterally  Psychiatry: Mood & affect appropriate    ICU Vital Signs Last 24 Hrs  T(C): --  T(F): --  HR: 85 (24 Jan 2023 13:27) (73 - 85)  BP: 87/57 (24 Jan 2023 13:27) (87/57 - 120/68)  BP(mean): 65 (24 Jan 2023 13:27) (65 - 80)  ABP: --  ABP(mean): --  RR: 18 (24 Jan 2023 13:27) (18 - 21)  SpO2: 97% (24 Jan 2023 13:27) (95% - 97%)    O2 Parameters below as of 24 Jan 2023 13:27  Patient On (Oxygen Delivery Method): room air    ASSESSMENT AND PLAN:    57 year old male with PMH of HTN, HLD, Bicuspid aortic valve disease, Coarctation of Aorta, s/p aorta coarctation repair @ 8 years of age- presents to [facility], with pre op diagnosis of congenital stenosis of aorta, presented to [facility] for cardiac cath and CoA stent with Dr Garza.  Patient with systemic HTN, evaluated by cardiothoracic surgeon for Surgical aortic valve replacement and possible Bentall one month after stent angioplasty for recurrent coarctation of Aorta. Patient tolerated the procedure well. Post operative monitoring to be done in the CCU.     PLAN:  Admit to CCU for continuous tele monitoring   Monitor right groin perclose site  pediatric ECHO in the am  left radial a-line transduced, can be discontinued in the am  EKG 12 lead  CMP, CBC, ordered  Supplemental O2 as needed, titrate to off  Resumed diet, tolerating clears at bedside  hold antihypertensive medications  ancef 1 gram x 2 more doses  CXR   pain management- tylenol  1 gram given at 1pm  Dilaudid 0.25 tablet ordered x1 in CCU for pain  Dr. Garza to be notified for any changes in condition  Likely to be discharged in am

## 2023-01-25 ENCOUNTER — TRANSCRIPTION ENCOUNTER (OUTPATIENT)
Age: 58
End: 2023-01-25

## 2023-01-25 VITALS — HEART RATE: 64 BPM | RESPIRATION RATE: 16 BRPM | OXYGEN SATURATION: 95 %

## 2023-01-25 LAB
ALBUMIN SERPL ELPH-MCNC: 3.9 G/DL — SIGNIFICANT CHANGE UP (ref 3.3–5)
ALP SERPL-CCNC: 138 U/L — HIGH (ref 40–120)
ALT FLD-CCNC: 16 U/L — SIGNIFICANT CHANGE UP (ref 4–41)
ANION GAP SERPL CALC-SCNC: 13 MMOL/L — SIGNIFICANT CHANGE UP (ref 7–14)
AST SERPL-CCNC: 16 U/L — SIGNIFICANT CHANGE UP (ref 4–40)
BILIRUB SERPL-MCNC: 0.4 MG/DL — SIGNIFICANT CHANGE UP (ref 0.2–1.2)
BUN SERPL-MCNC: 22 MG/DL — SIGNIFICANT CHANGE UP (ref 7–23)
CALCIUM SERPL-MCNC: 9.3 MG/DL — SIGNIFICANT CHANGE UP (ref 8.4–10.5)
CHLORIDE SERPL-SCNC: 102 MMOL/L — SIGNIFICANT CHANGE UP (ref 98–107)
CO2 SERPL-SCNC: 21 MMOL/L — LOW (ref 22–31)
CREAT SERPL-MCNC: 1.23 MG/DL — SIGNIFICANT CHANGE UP (ref 0.5–1.3)
EGFR: 69 ML/MIN/1.73M2 — SIGNIFICANT CHANGE UP
GLUCOSE SERPL-MCNC: 146 MG/DL — HIGH (ref 70–99)
HCT VFR BLD CALC: 42.5 % — SIGNIFICANT CHANGE UP (ref 39–50)
HGB BLD-MCNC: 14.3 G/DL — SIGNIFICANT CHANGE UP (ref 13–17)
MAGNESIUM SERPL-MCNC: 1.8 MG/DL — SIGNIFICANT CHANGE UP (ref 1.6–2.6)
MCHC RBC-ENTMCNC: 26.6 PG — LOW (ref 27–34)
MCHC RBC-ENTMCNC: 33.6 GM/DL — SIGNIFICANT CHANGE UP (ref 32–36)
MCV RBC AUTO: 79.1 FL — LOW (ref 80–100)
NRBC # BLD: 0 /100 WBCS — SIGNIFICANT CHANGE UP (ref 0–0)
NRBC # FLD: 0 K/UL — SIGNIFICANT CHANGE UP (ref 0–0)
PHOSPHATE SERPL-MCNC: 4.6 MG/DL — HIGH (ref 2.5–4.5)
PLATELET # BLD AUTO: 262 K/UL — SIGNIFICANT CHANGE UP (ref 150–400)
POTASSIUM SERPL-MCNC: 3.9 MMOL/L — SIGNIFICANT CHANGE UP (ref 3.5–5.3)
POTASSIUM SERPL-SCNC: 3.9 MMOL/L — SIGNIFICANT CHANGE UP (ref 3.5–5.3)
PROT SERPL-MCNC: 6.8 G/DL — SIGNIFICANT CHANGE UP (ref 6–8.3)
RBC # BLD: 5.37 M/UL — SIGNIFICANT CHANGE UP (ref 4.2–5.8)
RBC # FLD: 12.5 % — SIGNIFICANT CHANGE UP (ref 10.3–14.5)
SODIUM SERPL-SCNC: 136 MMOL/L — SIGNIFICANT CHANGE UP (ref 135–145)
WBC # BLD: 16.31 K/UL — HIGH (ref 3.8–10.5)
WBC # FLD AUTO: 16.31 K/UL — HIGH (ref 3.8–10.5)

## 2023-01-25 PROCEDURE — 93325 DOPPLER ECHO COLOR FLOW MAPG: CPT | Mod: 26

## 2023-01-25 PROCEDURE — 93320 DOPPLER ECHO COMPLETE: CPT | Mod: 26

## 2023-01-25 PROCEDURE — 93303 ECHO TRANSTHORACIC: CPT | Mod: 26

## 2023-01-25 RX ORDER — MAGNESIUM SULFATE 500 MG/ML
1 VIAL (ML) INJECTION ONCE
Refills: 0 | Status: COMPLETED | OUTPATIENT
Start: 2023-01-25 | End: 2023-01-25

## 2023-01-25 RX ORDER — POTASSIUM CHLORIDE 20 MEQ
20 PACKET (EA) ORAL ONCE
Refills: 0 | Status: COMPLETED | OUTPATIENT
Start: 2023-01-25 | End: 2023-01-25

## 2023-01-25 RX ORDER — AMLODIPINE BESYLATE 2.5 MG/1
10 TABLET ORAL DAILY
Refills: 0 | Status: DISCONTINUED | OUTPATIENT
Start: 2023-01-25 | End: 2023-01-25

## 2023-01-25 RX ADMIN — PANTOPRAZOLE SODIUM 40 MILLIGRAM(S): 20 TABLET, DELAYED RELEASE ORAL at 05:02

## 2023-01-25 RX ADMIN — Medication 100 GRAM(S): at 06:12

## 2023-01-25 RX ADMIN — Medication 100 MILLIGRAM(S): at 05:02

## 2023-01-25 RX ADMIN — Medication 20 MILLIEQUIVALENT(S): at 06:11

## 2023-01-25 RX ADMIN — CHLORHEXIDINE GLUCONATE 1 APPLICATION(S): 213 SOLUTION TOPICAL at 05:03

## 2023-01-25 NOTE — DISCHARGE NOTE PROVIDER - NSDCFUSCHEDAPPT_GEN_ALL_CORE_FT
Santo Garza  [facility]  [facility] [address]  Scheduled Appointment: 02/10/2023

## 2023-01-25 NOTE — DISCHARGE NOTE PROVIDER - HOSPITAL COURSE
57-year-old male with PMH of HTN, HLD, former smoker (quit 20 years ago), Bicuspid aortic valve disease, Coarctation of Aorta (s/p aorta coarctation repair at 9yo) with pre-op diagnosis of congenital stenosis of aorta, presented to [facility] for cardiac cath and CoA stent with Dr Garza prior to cardiothoracic surgery (for aortic valve replacement and possible Bentall one month after stent angioplasty for recurrent coarctation of Aorta). Patient tolerated the procedure well and underwent post-operative management in CCU. At time of discharge, he was stable, tolerating diet and ambulation. 57-year-old male with PMH of HTN, HLD, former smoker (quit 20 years ago), Bicuspid aortic valve disease, Coarctation of Aorta (s/p aorta coarctation repair at 9yo) with pre-op diagnosis of congenital stenosis of aorta, presented to [facility] for cardiac cath and CoA stent with Dr Garza prior to cardiothoracic surgery (for aortic valve replacement and possible Bentall one month after stent angioplasty for recurrent coarctation of Aorta). Patient tolerated the procedure well and underwent post-procedural management in CCU, including control of expected mild post-procedural pain with Tylenol. At time of discharge, he was stable, tolerating diet and ambulation (improved compared to pre-procedure), and passing gas.

## 2023-01-25 NOTE — DISCHARGE NOTE NURSING/CASE MANAGEMENT/SOCIAL WORK - PATIENT PORTAL LINK FT
You can access the FollowMyHealth Patient Portal offered by [facility] by registering at the following website: [email or web address]. By joining [employer]’s FollowMyHealth portal, you will also be able to view your health information using other applications (apps) compatible with our system.

## 2023-01-25 NOTE — DISCHARGE NOTE PROVIDER - CARE PROVIDER_API CALL
Santo Garza)  Pediatric Cardiology; Pediatrics  [address], [address]  [place] [address]  Phone: [phone]  Fax: [phone]  Established Patient  Follow Up Time:     Buster Juan)  Thoracic and Cardiac Surgery  [address] [place] [id number]  Phone: [phone]  Fax: [phone]  Established Patient  Follow Up Time:

## 2023-01-25 NOTE — PROGRESS NOTE ADULT - SUBJECTIVE AND OBJECTIVE BOX
Patient is a 57y old  Male who presents with a chief complaint of need for aortic stent.    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 Patient tolerated the procedure well and is now undergoing post-operative management in CCU, including echo prior to discharge.    INTERVAL HPI/OVERNIGHT EVENTS:   No overnight events   Afebrile, hemodynamically stable     Subjective:    ICU Vital Signs Last 24 Hrs  T(C): 36.4 (2023 08:00), Max: 36.8 (2023 19:57)  T(F): 97.6 (2023 08:00), Max: 98.3 (2023 19:57)  HR: 62 (2023 06:00) (56 - 105)  BP: 96/59 (2023 14:30) (87/57 - 120/68)  BP(mean): 71 (2023 14:30) (65 - 80)  ABP: 130/70 (2023 06:00) (104/55 - 134/73)  ABP(mean): 94 (2023 06:00) (75 - 98)  RR: 23 (2023 06:00) (16 - 26)  SpO2: 97% (2023 06:00) (94% - 100%)    O2 Parameters below as of 2023 02:00  Patient On (Oxygen Delivery Method): mask, simple face      I&O's Summary    2023 07:01  -  2023 07:00  --------------------------------------------------------  IN: 0 mL / OUT: 2025 mL / NET: -2025 mL    Daily Height in cm: 177.8 (2023 14:30)    Daily Weight in k (2023 04:00)    EKG: Personally reviewed. Prolonged QTC 480s.  Telemetry: Some episodes of HR to 100s, briefly 110s; occasional 2-3 beats VT not sustained, occasional PVCs, a missed beat, some periods of O2 sats to low 90s.     MEDICATIONS  (STANDING):  chlorhexidine 2% Cloths 1 Application(s) Topical <User Schedule>  coronavirus monovalent Vaccine (PFIZER) 0.3 milliLiter(s) IntraMuscular once  pantoprazole    Tablet 40 milliGRAM(s) Oral before breakfast    PHYSICAL EXAM: ***to update  General: Reclining in bed in no acute distress.  HEENT: Normocephalic, atraumatic. Extraocular movements grossly intact. No nasal discharge.  Neuro: Alert and oriented. No facial asymmetry or dysarthria. Moving all extremities.  CV: Regular rate and rhythm. S1/S2. No murmurs appreciated. No distal edema.  Respiratory: Lung fields clear bilaterally. No crackles or wheezes appreciated.  Abdomen: Soft; nontender to palpation, all quadrants; nondistended. No guarding.  : Suprapubic region nontender.  Skin: No rashes or bruising of face, forearms, or calves bilaterally.  Psych: Mood and affect appropriate.     LABS:                        14.3   16.31 )-----------( 262      ( 2023 05:10 )             42.5         136  |  102  |  22  ----------------------------<  146<H>  3.9   |  21<L>  |  1.23    Ca    9.3      2023 05:10  Phos  4.6       Mg     1.80         TPro  6.8  /  Alb  3.9  /  TBili  0.4  /  DBili  x   /  AST  16  /  ALT  16  /  AlkPhos  138<H>      LIVER FUNCTIONS - ( 2023 05:10 )  Alb: 3.9 g/dL / Pro: 6.8 g/dL / ALK PHOS: 138 U/L / ALT: 16 U/L / AST: 16 U/L / GGT: x             RADIOLOGY & ADDITIONAL TESTS:   - Personally reviewed paper chart, CXR (pending final read), last CT    Care Discussed with Consultants/Other Providers [ x] YES  [ ] NO         Patient is a 57y old  Male who presents with a chief complaint of need for aortic stent.    HPI:   57-year-old male with PMH of HTN, HLD, former smoker (quit 20 years ago), Bicuspid aortic valve disease, Coarctation of Aorta (s/p aorta coarctation repair at 9yo) with pre-op diagnosis of congenital stenosis of aorta, presented to [facility] for cardiac cath and CoA stent with Dr Garza prior to cardiothoracic surgery (for aortic valve replacement and possible Bentall one month after stent angioplasty for recurrent coarctation of Aorta). Patient tolerated the procedure well and is now undergoing post-operative management in CCU, including echo prior to discharge.    INTERVAL HPI/OVERNIGHT EVENTS:   No overnight events   Afebrile, hemodynamically stable     Subjective:  - Per day RN, patient has been de-lined and on room air today, getting echo, tolerating diet, hasn't ambulated in halls yet.    ICU Vital Signs Last 24 Hrs  T(C): 36.4 (2023 08:00), Max: 36.8 (2023 19:57)  T(F): 97.6 (2023 08:00), Max: 98.3 (2023 19:57)  HR: 62 (2023 06:00) (56 - 105)  BP: 96/59 (2023 14:30) (87/57 - 120/68)  BP(mean): 71 (2023 14:30) (65 - 80)  ABP: 130/70 (2023 06:00) (104/55 - 134/73)  ABP(mean): 94 (2023 06:00) (75 - 98)  RR: 23 (2023 06:00) (16 - 26)  SpO2: 97% (2023 06:00) (94% - 100%) room air      I&O's Summary    2023 07:01  -  2023 07:00  --------------------------------------------------------  IN: 0 mL / OUT: 2025 mL / NET: -2025 mL    Daily Height in cm: 177.8 (2023 14:30)    Daily Weight in k (2023 04:00)    EKG: Personally reviewed. Prolonged QTC 480s.  Telemetry: Some episodes of HR to 100s, briefly 110s; occasional 2-3 beats VT not sustained, occasional PVCs, a missed beat, some periods of O2 sats to low 90s.     MEDICATIONS  (STANDING):  chlorhexidine 2% Cloths 1 Application(s) Topical <User Schedule>  coronavirus monovalent Vaccine (PFIZER) 0.3 milliLiter(s) IntraMuscular once  pantoprazole    Tablet 40 milliGRAM(s) Oral before breakfast    PHYSICAL EXAM: ***to update  General: Reclining in bed in no acute distress.  HEENT: Normocephalic, atraumatic. Extraocular movements grossly intact. No nasal discharge.  Neuro: Alert and oriented. No facial asymmetry or dysarthria. Moving all extremities.  CV: Regular rate and rhythm.. S1/S2. No murmurs appreciated. No distal edema.  Respiratory: Lung fields clear bilaterally. No crackles or wheezes appreciated.  Abdomen: Soft; nontender to palpation, all quadrants; nondistended. No guarding.  : Suprapubic region nontender.  Skin: No rashes or bruising of face, forearms, or calves bilaterally.  Psych: Mood and affect appropriate.     LABS:                        14.3   16.31 )-----------( 262      ( 2023 05:10 )             42.5         136  |  102  |  22  ----------------------------<  146<H>  3.9   |  21<L>  |  1.23    Ca    9.3      2023 05:10  Phos  4.6       Mg     1.80         TPro  6.8  /  Alb  3.9  /  TBili  0.4  /  DBili  x   /  AST  16  /  ALT  16  /  AlkPhos  138<H>      LIVER FUNCTIONS - ( 2023 05:10 )  Alb: 3.9 g/dL / Pro: 6.8 g/dL / ALK PHOS: 138 U/L / ALT: 16 U/L / AST: 16 U/L / GGT: x             RADIOLOGY & ADDITIONAL TESTS:   - Personally reviewed paper chart, CXR (pending final read), last CT    Care Discussed with Consultants/Other Providers [ x] YES  [ ] NO         Patient is a 57y old  Male who presents with a chief complaint of need for aortic stent.    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 Patient tolerated the procedure well and is now undergoing post-operative management in CCU, including echo prior to discharge.    INTERVAL HPI/OVERNIGHT EVENTS:   No overnight events. Per night team, patient had back pain (received Tylenol) and reflux symptoms that resolved.  Afebrile, hemodynamically stable     Subjective:  - Per day RN (while patient undergoing echo), patient has been de-lined and on room air today, getting echo, tolerating diet, hadn't ambulated in halls yet.  - Patient denied shortness of breath at rest or with ambulation, new discomforts, new numbness/tingling or swelling, lightheadedness/dizziness/faintness, URI symptoms.  - Patient endorsed mild chest pain 2-3/10 overnight, 1/10 on interview; voiding, passing gas; some leg soreness with ambulation; ambulation feeling better compared to pre-procedure; tolerating regular diet for dinner and breakfast; mild soreness at site of puncture wounds.    ICU Vital Signs Last 24 Hrs  T(C): 36.4 (2023 08:00), Max: 36.8 (2023 19:57)  T(F): 97.6 (2023 08:00), Max: 98.3 (2023 19:57)  HR: 62 (2023 06:00) (56 - 105)  BP: 96/59 (2023 14:30) (87/57 - 120/68)  BP(mean): 71 (2023 14:30) (65 - 80)  ABP: 130/70 (2023 06:00) (104/55 - 134/73)  ABP(mean): 94 (2023 06:00) (75 - 98)  RR: 23 (2023 06:00) (16 - 26)  SpO2: 97% (2023 06:00) (94% - 100%) room air      I&O's Summary    2023 07:01  -  2023 07:00  --------------------------------------------------------  IN: 0 mL / OUT: 2025 mL / NET: -2025 mL    Daily Height in cm: 177.8 (2023 14:30)    Daily Weight in k (2023 04:00)    EKG: Personally reviewed. Prolonged QTC 480s.  Telemetry: Some episodes of HR to 100s, briefly 110s; occasional 2-3 beats VT not sustained, occasional PVCs, a missed beat, some periods of O2 sats to low 90s.     MEDICATIONS  (STANDING):  chlorhexidine 2% Cloths 1 Application(s) Topical <User Schedule>  coronavirus monovalent Vaccine (PFIZER) 0.3 milliLiter(s) IntraMuscular once  pantoprazole    Tablet 40 milliGRAM(s) Oral before breakfast    PHYSICAL EXAM:   General: Reclining in bed in no acute distress.  HEENT: Normocephalic, atraumatic. Extraocular movements grossly intact. No nasal discharge.  Neuro: Alert and oriented. No facial asymmetry or dysarthria. Moving all extremities.  CV: Regular rate and rhythm.. S1/S2. +systolic murmur.  - a-line site c/d/i, no localized edema/calor/erythema, mild bruising.  - R groin site c/d/i, no tenderness along periphery of dressing.  Respiratory: Lung fields clear bilaterally. No crackles or wheezes appreciated.  Abdomen: Soft; nontender to palpation, all quadrants; nondistended. No guarding.  : Suprapubic region nontender.  Skin: No rashes or bruising of face, forearms, or calves bilaterally, except as above and mild ecchymosis R forearm medial c/w s/p IV insertion attempt.  Psych: Mood and affect appropriate.     LABS:                        14.3   16.31 )-----------( 262      ( 2023 05:10 )             42.5         136  |  102  |  22  ----------------------------<  146<H>  3.9   |  21<L>  |  1.23    Ca    9.3      2023 05:10  Phos  4.6       Mg     1.80         TPro  6.8  /  Alb  3.9  /  TBili  0.4  /  DBili  x   /  AST  16  /  ALT  16  /  AlkPhos  138<H>      LIVER FUNCTIONS - ( 2023 05:10 )  Alb: 3.9 g/dL / Pro: 6.8 g/dL / ALK PHOS: 138 U/L / ALT: 16 U/L / AST: 16 U/L / GGT: x             RADIOLOGY & ADDITIONAL TESTS:   - Personally reviewed paper chart, CXR, last CT  - Echo < from: Echocardiogram, Pediatric (Echocardiogram, Pediatric .) (23 @ 08:45) >  Summary:   1. Technically limited imaging secondary to poor acoustic windows.   2. History of repair of coarctation of aorta by report.   3. S/p transcatheter stent placement to relieve residual coarctation (2023).   4. The aortic valve leaflets appear calcified and somewhat limited excursion of the valve with the peak gradient of ~30-40 mmHg.   5. Stented aortic isthmus appears hypoplastic and flow seen through stented part of the aortic arch without obstruction.   6. Normal right ventricular morphology with qualitatively normal size and systolic function.   7. Normal left ventricular systolic function.   8. No pericardial effusion.   9. No pleural effusion.    < end of copied text >    Care Discussed with Consultants/Other Providers [ x] YES  [ ] NO

## 2023-01-25 NOTE — DISCHARGE NOTE NURSING/CASE MANAGEMENT/SOCIAL WORK - HAS THE PATIENT USED TOBACCO IN THE PAST 30 DAYS?
No
Implemented All Fall with Harm Risk Interventions:  [place] to call system. Call bell, personal items and telephone within reach. Instruct patient to call for assistance. Room bathroom lighting operational. Non-slip footwear when patient is off stretcher. Physically safe environment: no spills, clutter or unnecessary equipment. Stretcher in lowest position, wheels locked, appropriate side rails in place. Provide visual cue, wrist band, yellow gown, etc. Monitor gait and stability. Monitor for mental status changes and reorient to person, place, and time. Review medications for side effects contributing to fall risk. Reinforce activity limits and safety measures with patient and family. Provide visual clues: red socks.

## 2023-01-25 NOTE — DISCHARGE NOTE NURSING/CASE MANAGEMENT/SOCIAL WORK - NSDCPEFALRISK_GEN_ALL_CORE
For information on Fall & Injury Prevention, visit: [email or web address] OR  [email or web address] OR  https://www.cdc.gov/steadi/patient.html

## 2023-01-25 NOTE — DISCHARGE NOTE PROVIDER - PROVIDER TOKENS
PROVIDER:[TOKEN:[[id number]],ESTABLISHEDPATIENT:[T]],PROVIDER:[TOKEN:[[id number]],ESTABLISHEDPATIENT:[T]]

## 2023-01-25 NOTE — DISCHARGE NOTE PROVIDER - NSDCMRMEDTOKEN_GEN_ALL_CORE_FT
amLODIPine 10 mg oral tablet: 1 tab(s) orally once a day  atorvastatin 40 mg oral tablet: 1 tab(s) orally once a day  lansoprazole 30 mg oral delayed release capsule: 1 cap(s) orally once a day  Therapeutic Multiple Vitamins oral tablet: 1 tab(s) orally once a day/ LD on 01/18/23

## 2023-01-25 NOTE — DISCHARGE NOTE PROVIDER - NSDCCPCAREPLAN_GEN_ALL_CORE_FT
PRINCIPAL DISCHARGE DIAGNOSIS  Diagnosis: Status post aortic coarctation stent placement  Assessment and Plan of Treatment: You came to the hospital for aortic coarctation stent placement. You tolerated the procedure well without complications and were admitted to the CCU for close monitoring. At time of discharge, you were clinically stable and feeling well.       PRINCIPAL DISCHARGE DIAGNOSIS  Diagnosis: Status post aortic coarctation stent placement  Assessment and Plan of Treatment: You came to the hospital for aortic coarctation stent placement. You tolerated the procedure well without complications and were admitted to the CCU for close monitoring. You received Tylenol for expected mild post-procedural pain. At time of discharge, you were clinically stable and feeling well. Please follow up with Dr. Garza and Dr. Juan as planned.      SECONDARY DISCHARGE DIAGNOSES  Diagnosis: Hyperlipidemia  Assessment and Plan of Treatment:     Diagnosis: Hypertension  Assessment and Plan of Treatment:     Diagnosis: Need for prophylactic measure  Assessment and Plan of Treatment:      PRINCIPAL DISCHARGE DIAGNOSIS  Diagnosis: Status post aortic coarctation stent placement  Assessment and Plan of Treatment: You came to the hospital for aortic coarctation stent placement. You tolerated the procedure well without complications and were admitted to the CCU for close monitoring. You received Tylenol for expected mild post-procedural pain. At time of discharge, you were clinically stable and feeling well. Please follow up with Dr. Garza and Dr. Juan as planned.   Please seek emergent medical attention if you notice bleeding, extensive bruising, increased swelling, or increased pain in the areas of your right groin site or left wrist; if you develop numbness or tingling of your limbs especially of your right leg or left arm; if you experience worsening chest pain, heart palpitations, lightheadedness, or dizziness.      SECONDARY DISCHARGE DIAGNOSES  Diagnosis: Hyperlipidemia  Assessment and Plan of Treatment:     Diagnosis: Hypertension  Assessment and Plan of Treatment:     Diagnosis: Need for prophylactic measure  Assessment and Plan of Treatment:

## 2023-01-25 NOTE — PROGRESS NOTE ADULT - ASSESSMENT
57-year-old male with PMH of HTN, HLD, former smoker (quit 20 years ago), Bicuspid aortic valve disease, Coarctation of Aorta (s/p aorta coarctation repair at 7yo) with pre-op diagnosis of congenital stenosis of aorta, presented to [facility] for cardiac cath and CoA stent with Dr Garza prior to cardiothoracic surgery (for aortic valve replacement and possible Bentall one month after stent angioplasty for recurrent coarctation of Aorta). Patient tolerated the procedure well and is now undergoing post-operative management in CCU, including echo prior to discharge.    PLAN:    NEURO  - no active issues, A&O3    CARDIOVASCULAR  # s/p aortic stent  - ***  > left radial a-line transduced, can be discontinued in the am ***  > monitor right groin perclose site  > CXR final read  > pediatric ECHO  > pain management: Tylenol as needed  > K >4, Mg >2  > Dr. Garza to be notified for any changes in condition    # HTN  > holding home anti-hypertensive medications; consider reintroduction, pending clinical course    RESPIRATORY  > supplemental O2 as needed, titrate to off    GI  > Diet: regular  > c/w home med(s)/therapeutic equivalent: pantoprazole    /RENAL  - No active issues, baseline Cr ~1.2    SKIN  - No active issues.     ID  - Leukocytosis likely reactive i/s/o recent procedure. Afebrile, no acute signs of infection.***  > COVID vax upon discharge    ENDOCRINE  - No active issues.     HEMATOLOGIC/DVT ppx  - No active issues.   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 Patient tolerated the procedure well and is now undergoing post-operative management in CCU, including echo prior to discharge.    PLAN:    NEURO  - no active issues, A&O3    CARDIOVASCULAR    # s/p aortic stent  > monitor right groin perclose site  > CXR final read  > pediatric ECHO  > pain management: Tylenol as needed  > K >4, Mg >2  > Dr. Garza to be notified for any changes in condition    # HTN  > holding home anti-hypertensive medications; consider reintroduction, pending clinical course    RESPIRATORY  > supplemental O2 as needed, titrate to off    GI  > Diet: regular  > c/w home med(s)/therapeutic equivalent: pantoprazole    /RENAL  - No active issues, baseline Cr ~1.2    SKIN  - No active issues.     ID  - Leukocytosis likely reactive i/s/o recent procedure. Afebrile, no acute signs of infection.***  > COVID vax upon discharge    ENDOCRINE  - No active issues.     HEMATOLOGIC/DVT ppx  - No active issues.  > Holding DVT chemo ppx i/s/o recent aortic stenting  > SCDs    #Ethics 57-year-old male with PMH of HTN, HLD, former smoker (quit 20 years ago), Bicuspid aortic valve disease, Coarctation of Aorta (s/p aorta coarctation repair at 9yo) with pre-op diagnosis of congenital stenosis of aorta, presented to [facility] for cardiac cath and CoA stent with Dr Garza prior to cardiothoracic surgery (for aortic valve replacement and possible Bentall one month after stent angioplasty for recurrent coarctation of Aorta). Patient tolerated the procedure well and is now undergoing post-operative management in CCU, stable for discharge.    PLAN:    NEURO  - no active issues, A&O3    CARDIOVASCULAR    # s/p aortic stent  > monitor right groin perclose site  - CXR clear lungs  - pediatric ECHO no acute findings  > pain management: Tylenol as needed  > K >4, Mg >2  > Dr. Garza to be notified for any changes in condition    # HTN  > to resume home amlodipine at home    RESPIRATORY  > supplemental O2 as needed, titrate to off    GI  > Diet: regular  > c/w home med(s)/therapeutic equivalent: pantoprazole    /RENAL  - No active issues, baseline Cr ~1.2    SKIN  - No active issues.     ID  - Leukocytosis likely reactive i/s/o recent procedure. Afebrile, no acute signs of infection.  > COVID vax upon discharge    ENDOCRINE  - No active issues.     HEMATOLOGIC/DVT ppx  - No active issues.  > held DVT chemo ppx i/s/o recent aortic stenting  > SCDs    #Ethics

## 2023-01-25 NOTE — DISCHARGE NOTE PROVIDER - NSDCCPTREATMENT_GEN_ALL_CORE_FT
PRINCIPAL PROCEDURE  Procedure: Insertion, stent, for aortic coarctation or recoarctation  Findings and Treatment: · Tolerance  Patient tolerated procedure well.  · Complications  no complications  · Estimated Blood Loss  Minimal  · Additional Procedure Details  Access:  7Fr RFV and 6Fr-->14Fr Fr RFA w US guidance.  Preliminary findings  Baseline  Saturations (%):   SVC/MV: 75%  PA: 77%  Travis: 98%  Qp: 4.7  mL/min/m2  Qs: 4.7  mL/min/m2  Qp:Qs: 1:1  Pressures (mmHg):   SVC/RA: 6  RV: 26/6  RPCW: 6  PA: 24/9 (15)  RPA: 24/9 (15)  AAo: 85/47 (62)  Travis: 79/50 (62)  Rp: 1.9 iWu  Condition #2 (Dobutamine @ 10 mcg/kg/min)  Pressures (mmHg):   AAo: 132/62 (88)  Travis: 105/63 (82)  /60  Post-intervention  Pressures (mmHg):   AAo: 113/66 (85)  Travis: 107/63 (81)  Angiography/Interventions (Key findings):  Angiography today demonstrated narrowing of the thoracic aorta distal to the origin of the left subclavian artery. There was a 25-30 mmhg gradient between the ascending and descending aorta. A 36mm by 12mm MAX LD stent was placed at the level of the narrowing with improved angiographic appearance and a 5mmhg gradient post-intervention.   Assessment: Dean is a 56 yo M  with PMH of HTN, HLD, Bicuspid aortic valve disease, Coarctation of Aorta, s/p coarctation repair @ 8 years of age now with re-coarctation of aorta.  He underwent invasive assessment today which demonstrated recoarctation of the thoracic aorta.    Plan:  -Lie flat with legs straight until 3pm  -HOB to 30 degrees from 3-5 pm  -Can ambulate at 5pm  - Patient will need 2 more doses of antibiotics to complete 24 hours of antibiotics  -Chest xray today  -Echo tomorrow   - Re-start anti-hypertension medication (Amlodipine) today  -Follow up with Dr Garza in 2-3 weeks        SECONDARY PROCEDURE  Procedure: CXR, single AP view  Findings and Treatment:   < end of copied text >  COMPARISON: CT chest 12/2/2022.  FINDINGS:  Proximal descending aortic stent.  The heart size is not well evaluated in this projection.  The lungs are clear.  There is no pneumothorax or pleural effusion.  There are no acute osseous abnormalities.  IMPRESSION:  Clear lungs.< from: Xray Chest 1 View- PORTABLE-Urgent (Xray Chest 1 View- PORTABLE-Urgent .) (01.24.23 @ 16:09) >

## 2023-02-10 ENCOUNTER — APPOINTMENT (OUTPATIENT)
Dept: PEDIATRIC CARDIOLOGY | Facility: CLINIC | Age: 58
End: 2023-02-10
Payer: COMMERCIAL

## 2023-02-10 VITALS
HEART RATE: 85 BPM | BODY MASS INDEX: 29.06 KG/M2 | DIASTOLIC BLOOD PRESSURE: 73 MMHG | HEIGHT: 70 IN | WEIGHT: 203 LBS | OXYGEN SATURATION: 100 % | SYSTOLIC BLOOD PRESSURE: 111 MMHG

## 2023-02-10 DIAGNOSIS — Q25.1 COARCTATION OF AORTA: ICD-10-CM

## 2023-02-10 LAB — SARS-COV-2 N GENE NPH QL NAA+PROBE: NOT DETECTED

## 2023-02-10 PROCEDURE — 99213 OFFICE O/P EST LOW 20 MIN: CPT | Mod: 25

## 2023-02-10 PROCEDURE — 93303 ECHO TRANSTHORACIC: CPT

## 2023-02-10 PROCEDURE — 93000 ELECTROCARDIOGRAM COMPLETE: CPT

## 2023-02-10 PROCEDURE — 93325 DOPPLER ECHO COLOR FLOW MAPG: CPT

## 2023-02-10 PROCEDURE — 93321 DOPPLER ECHO F-UP/LMTD STD: CPT

## 2023-02-10 NOTE — DISCUSSION/SUMMARY
[FreeTextEntry1] : PROBLEM LIST:\par 1. CoA.\par    a. s/p treatment in [place] at 8 years of age with Drs Huey and Lawrence.\par    b. s/p stent angioplasty with 36mm eV3 Max on 24mm BiB with post-dilation to 20mm with [employer] (24-Jan-2023, Greg).\par    c. no evidence of residual CoA.\par 2. Systemic HTN - normal BP today.\par 3. Bicuspid aortic valve with AoV stenosis - scheduled for AoV replacement on February 27th.\par \par In summary, NO is a 57 year M who presents for follow up after stent angioplasty of the CoA.  His history, physical exam, EKG, and echocardiogram are reassuring.  Specifically, he has clinically returned to baseline after his procedure and has no evidence of residual CoA.  I have suggested that he should establish care with our [facility] team for long term follow up and surveillance (including brain MRI for assessment of aneurysms and ongoing monitoring of his CoA for recurrent CoA or aortic aneurysms).  He will schedule this follow up after his aortic valve surgery.  He will call with any questions or concerns.\par \par In the interim, if there are any further questions, concerns or changes in his clinical status we would be happy to see him at that time.  He expressed understanding of and agreement with the plan. All questions were answered.  Thank you for allowing us to participate in the care of this patient.  Feel free to reach out to us with any further questions or concerns. [Needs SBE Prophylaxis] : [unfilled] does not need bacterial endocarditis prophylaxis

## 2023-02-10 NOTE — CARDIOLOGY SUMMARY
[Today's Date] : [unfilled] [FreeTextEntry1] : Sinus rhythm at a rate of 89 beats per minute with a normal PA interval. There is no evidence of atrial enlargement, ventricular hypertrophy or pre-excitation.  There is incomplete LBBB, LAD and prolonged QTc.  There are no ST or T-wave changes. [de-identified] : 03-Nov-2022 [FreeTextEntry2] : See report for details.  Briefly, severe AoV stenosis.  CoA stent in good position with normal flow profile. [de-identified] : 09-Dec-2022 [FreeTextEntry3] : Adult Cath demonstrated 20mmHg CoA gradient. [de-identified] : 02-Dec2022 [de-identified] : CT reviewed - discrete CoA just distal to LSCA.

## 2023-02-10 NOTE — REASON FOR VISIT
[Systemic Hypertension] : systemic hypertension [Coarctation Of The Aorta] : coarctation of the aorta [Aortic Stenosis] : aortic stenosis [Bicuspid Aortic Valve] : bicuspid aortic valve [Patient] : patient [Initial Consultation] : an initial consultation for

## 2023-02-10 NOTE — PHYSICAL EXAM
[General Appearance - Alert] : alert [General Appearance - In No Acute Distress] : in no acute distress [General Appearance - Well-Appearing] : well appearing [Attitude Uncooperative] : cooperative [Facies] : the head and face were normal in appearance [Examination Of The Oral Cavity] : mucous membranes were moist and pink [Respiration, Rhythm And Depth] : normal respiratory rhythm and effort [Auscultation Breath Sounds / Voice Sounds] : breath sounds clear to auscultation bilaterally [No Cough] : no cough [Normal Chest Appearance] : the chest was normal in appearance [Chest Visual Inspection Thoracic Deformity] : no chest wall deformity [Apical Impulse] : quiet precordium with normal apical impulse [Heart Rate And Rhythm] : normal heart rate and rhythm [Heart Sounds] : normal S1 and S2 [Heart Sounds Gallop] : no gallops [Heart Sounds Pericardial Friction Rub] : no pericardial rub [Heart Sounds Click] : no clicks [Arterial Pulses] : normal upper and lower extremity pulses with no pulse delay [Edema] : no edema [Capillary Refill Test] : normal capillary refill [Systolic] : systolic [II] : a grade 2/6 [Harsh] : harsh [Bowel Sounds] : normal bowel sounds [Abdomen Soft] : soft [Nondistended] : nondistended [Abdomen Tenderness] : non-tender [] : no hepato-splenomegaly [Nail Clubbing] : no clubbing  or cyanosis of the fingers

## 2023-02-10 NOTE — HISTORY OF PRESENT ILLNESS
[FreeTextEntry1] : I had the pleasure of seeing Mr. Atkins in consultation at the [facility] for follow up of coarctation of the aorta.  He was referred by Dr. Juan.  His medical history includes: CoA s/p treatment in [place] at 8 years of age with Rakesh Arzate and Lawrence, systemic HTN, and bicuspid aortic valve with AoV stenosis.  He underwent evaluation for SAVR/TAVR and as part of that assessment was found to have a hemodynamically significant CoA with a cath gradient of 20mmHg and narrowing to 10mm by CTA.  These findings prompted referral to me for consideration of CoA stent.  He subsequently underwent cardiac catheterization with stent angioplasty of the recurrent CoA.  He has done well since the catheterization and is now back to baseline despite initially having some musculoskeletal soreness, which is now resolved.  He has not had any chest pain, dyspnea, or syncope.

## 2023-02-10 NOTE — CONSULT LETTER
[Today's Date] : [unfilled] [Name] : Name: [unfilled] [] : : ~~ [Today's Date:] : [unfilled] [Dear  ___:] : Dear Dr. [unfilled]: [Consult] : I had the pleasure of evaluating your patient, [unfilled]. My full evaluation follows. [Consult - Single Provider] : Thank you very much for allowing me to participate in the care of this patient. If you have any questions, please do not hesitate to contact me. [Sincerely,] : Sincerely, [DrParish  ___] : Dr. PENA [FreeTextEntry4] : Buster Jaun MD [FreeTextEntry5] : [address]  [FreeTextEntry6] : [place] [address] [de-identified] : See note for details. [de-identified] : Santo Garza MD, MS\par Congenital Interventional Cardiologist\par Director of Pediatric Cardiac Catheterization\par [facility]\par  of Pediatrics, [facility]\par \par [facility] [place]\par [address]\[place]  [address]\par Tel: [phone]\par Fax: [phone]\par \par [email or web address]

## 2023-02-24 ENCOUNTER — OUTPATIENT (OUTPATIENT)
Dept: OUTPATIENT SERVICES | Facility: HOSPITAL | Age: 58
LOS: 1 days | End: 2023-02-24
Payer: COMMERCIAL

## 2023-02-24 VITALS
RESPIRATION RATE: 20 BRPM | TEMPERATURE: 98 F | HEIGHT: 70 IN | DIASTOLIC BLOOD PRESSURE: 84 MMHG | OXYGEN SATURATION: 96 % | SYSTOLIC BLOOD PRESSURE: 112 MMHG | HEART RATE: 79 BPM | WEIGHT: 203.05 LBS

## 2023-02-24 DIAGNOSIS — I35.0 NONRHEUMATIC AORTIC (VALVE) STENOSIS: ICD-10-CM

## 2023-02-24 DIAGNOSIS — Z86.79 PERSONAL HISTORY OF OTHER DISEASES OF THE CIRCULATORY SYSTEM: ICD-10-CM

## 2023-02-24 DIAGNOSIS — Z29.9 ENCOUNTER FOR PROPHYLACTIC MEASURES, UNSPECIFIED: ICD-10-CM

## 2023-02-24 DIAGNOSIS — Z01.818 ENCOUNTER FOR OTHER PREPROCEDURAL EXAMINATION: ICD-10-CM

## 2023-02-24 DIAGNOSIS — Z87.74 PERSONAL HISTORY OF (CORRECTED) CONGENITAL MALFORMATIONS OF HEART AND CIRCULATORY SYSTEM: Chronic | ICD-10-CM

## 2023-02-24 DIAGNOSIS — Z92.89 PERSONAL HISTORY OF OTHER MEDICAL TREATMENT: Chronic | ICD-10-CM

## 2023-02-24 LAB
A1C WITH ESTIMATED AVERAGE GLUCOSE RESULT: 5.9 % — HIGH (ref 4–5.6)
ANION GAP SERPL CALC-SCNC: 14 MMOL/L — SIGNIFICANT CHANGE UP (ref 5–17)
BLD GP AB SCN SERPL QL: NEGATIVE — SIGNIFICANT CHANGE UP
BUN SERPL-MCNC: 21 MG/DL — SIGNIFICANT CHANGE UP (ref 7–23)
CALCIUM SERPL-MCNC: 10 MG/DL — SIGNIFICANT CHANGE UP (ref 8.4–10.5)
CHLORIDE SERPL-SCNC: 101 MMOL/L — SIGNIFICANT CHANGE UP (ref 96–108)
CO2 SERPL-SCNC: 23 MMOL/L — SIGNIFICANT CHANGE UP (ref 22–31)
CREAT SERPL-MCNC: 1.29 MG/DL — SIGNIFICANT CHANGE UP (ref 0.5–1.3)
EGFR: 65 ML/MIN/1.73M2 — SIGNIFICANT CHANGE UP
ESTIMATED AVERAGE GLUCOSE: 123 MG/DL — HIGH (ref 68–114)
GLUCOSE SERPL-MCNC: 152 MG/DL — HIGH (ref 70–99)
HCT VFR BLD CALC: 47.9 % — SIGNIFICANT CHANGE UP (ref 39–50)
HGB BLD-MCNC: 16 G/DL — SIGNIFICANT CHANGE UP (ref 13–17)
MCHC RBC-ENTMCNC: 26.6 PG — LOW (ref 27–34)
MCHC RBC-ENTMCNC: 33.4 GM/DL — SIGNIFICANT CHANGE UP (ref 32–36)
MCV RBC AUTO: 79.7 FL — LOW (ref 80–100)
NRBC # BLD: 0 /100 WBCS — SIGNIFICANT CHANGE UP (ref 0–0)
PLATELET # BLD AUTO: 311 K/UL — SIGNIFICANT CHANGE UP (ref 150–400)
POTASSIUM SERPL-MCNC: 3.6 MMOL/L — SIGNIFICANT CHANGE UP (ref 3.5–5.3)
POTASSIUM SERPL-SCNC: 3.6 MMOL/L — SIGNIFICANT CHANGE UP (ref 3.5–5.3)
RBC # BLD: 6.01 M/UL — HIGH (ref 4.2–5.8)
RBC # FLD: 12.6 % — SIGNIFICANT CHANGE UP (ref 10.3–14.5)
RH IG SCN BLD-IMP: POSITIVE — SIGNIFICANT CHANGE UP
SODIUM SERPL-SCNC: 138 MMOL/L — SIGNIFICANT CHANGE UP (ref 135–145)
WBC # BLD: 10.99 K/UL — HIGH (ref 3.8–10.5)
WBC # FLD AUTO: 10.99 K/UL — HIGH (ref 3.8–10.5)

## 2023-02-24 PROCEDURE — 93880 EXTRACRANIAL BILAT STUDY: CPT | Mod: 26

## 2023-02-24 PROCEDURE — 87641 MR-STAPH DNA AMP PROBE: CPT

## 2023-02-24 PROCEDURE — 85027 COMPLETE CBC AUTOMATED: CPT

## 2023-02-24 PROCEDURE — 80048 BASIC METABOLIC PNL TOTAL CA: CPT

## 2023-02-24 PROCEDURE — 86901 BLOOD TYPING SEROLOGIC RH(D): CPT

## 2023-02-24 PROCEDURE — G0463: CPT

## 2023-02-24 PROCEDURE — 86900 BLOOD TYPING SEROLOGIC ABO: CPT

## 2023-02-24 PROCEDURE — 86850 RBC ANTIBODY SCREEN: CPT

## 2023-02-24 PROCEDURE — 71046 X-RAY EXAM CHEST 2 VIEWS: CPT | Mod: 26

## 2023-02-24 PROCEDURE — 93880 EXTRACRANIAL BILAT STUDY: CPT

## 2023-02-24 PROCEDURE — 71046 X-RAY EXAM CHEST 2 VIEWS: CPT

## 2023-02-24 PROCEDURE — 83036 HEMOGLOBIN GLYCOSYLATED A1C: CPT

## 2023-02-24 PROCEDURE — 87640 STAPH A DNA AMP PROBE: CPT

## 2023-02-24 RX ORDER — CEFUROXIME AXETIL 250 MG
1500 TABLET ORAL ONCE
Refills: 0 | Status: COMPLETED | OUTPATIENT
Start: 2023-03-03 | End: 2023-03-03

## 2023-02-24 NOTE — H&P PST ADULT - NSICDXPASTMEDICALHX_GEN_ALL_CORE_FT
PAST MEDICAL HISTORY:  AS (aortic stenosis)     Bicuspid aortic valve     Bilateral dry eyes     Coarctation of aorta     Deviated septum     History of heartburn     HLD (hyperlipidemia)     HTN (hypertension)     Mild back pain     Snores

## 2023-02-24 NOTE — H&P PST ADULT - FUNCTIONAL STATUS
walking 1.5 miles daily, lawn, up and down the stair/4-10 METS walking 1.5 miles daily, does the lawn, up and down the stair/4-10 METS

## 2023-02-24 NOTE — H&P PST ADULT - PROBLEM SELECTOR PLAN 1
Scheduled for aortic valve replacement   preop instruction provided, verbalized understanding and teach back   fs on admit

## 2023-02-24 NOTE — H&P PST ADULT - HISTORY OF PRESENT ILLNESS
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 His medical history significant for HTN, GERD, hyperlipidemia, bicuspid aortic valve, deviated nasal septum, lower back pain. He denies any chest pain, no SOB.  covid-19 PCR test on 3/1/2023.

## 2023-02-24 NOTE — H&P PST ADULT - ASSESSMENT
Patient denies any loose or broken tooth, no denture, no implant   Mallampati 2  Activities- walks 1.5 miles daily, moans the lawn, able to walk up and down the stair without SOB, no CP; DASI score 6.76    CAPRINI SCORE [CLOT updated 18]    AGE RELATED RISK FACTORS                                                       MOBILITY RELATED FACTORS  1[ ] Age 41-60 years                                            (1 Point)                    [ ] Bed rest                                                        (1 Point)  [ ] Age: 61-74 years                                           (2 Points)                  [ ] Plaster cast                                                   (2 Points)  [ ] Age= 75 years                                              (3 Points)                    [ ] Bed bound for more than 72 hours                 (2 Points)    DISEASE RELATED RISK FACTORS                                               GENDER SPECIFIC FACTORS  [ ] Edema in the lower extremities                       (1 Point)              [ ] Pregnancy                                                     (1 Point)  [ ] Varicose veins                                               (1 Point)                     [ ] Post-partum < 6 weeks                                   (1 Point)             [1 ] BMI > 25 Kg/m2                                            (1 Point)                     [ ] Hormonal therapy  or oral contraception          (1 Point)                 [ ] Sepsis (in the previous month)                        (1 Point)               [ ] History of pregnancy complications                 (1 point)  [ ] Pneumonia or serious lung disease                                               [ ] Unexplained or recurrent                     (1 Point)           (in the previous month)                               (1 Point)  [ ] Abnormal pulmonary function test                     (1 Point)                 SURGERY RELATED RISK FACTORS  [ ] Acute myocardial infarction                              (1 Point)               [ ]  Section                                             (1 Point)  [ ] Congestive heart failure (in the previous month)  (1 Point)      [ ] Minor surgery                                                  (1 Point)   [ ] Inflammatory bowel disease                             (1 Point)               [ ] Arthroscopic surgery                                        (2 Points)  [ ] Central venous access                                      (2 Points)                [2 ] General surgery lasting more than 45 minutes (2 points)  [ ] Present or previous malignancy                     (2 Points)                [ ] Elective arthroplasty                                         (5 points)    [ ] Stroke (in the previous month)                          (5 Points)                                                                                                                                                           HEMATOLOGY RELATED FACTORS                                                 TRAUMA RELATED RISK FACTORS  [ ] Prior episodes of VTE                                     (3 Points)                [ ] Fracture of the hip, pelvis, or leg                       (5 Points)  [ ] Positive family history for VTE                         (3 Points)             [ ] Acute spinal cord injury (in the previous month)  (5 Points)  [ ] Prothrombin 00664 A                                     (3 Points)               [ ] Paralysis  (less than 1 month)                             (5 Points)  [ ] Factor V Leiden                                             (3 Points)                  [ ] Multiple Trauma within 1 month                        (5 Points)  [ ] Lupus anticoagulants                                     (3 Points)                                                           [ ] Anticardiolipin antibodies                               (3 Points)                                                       [ ] High homocysteine in the blood                      (3 Points)                                             [ ] Other congenital or acquired thrombophilia      (3 Points)                                                [ ] Heparin induced thrombocytopenia                  (3 Points)                                     Total Score [4]

## 2023-02-24 NOTE — H&P PST ADULT - NSICDXPROCEDURE_GEN_ALL_CORE_FT
PROCEDURES:  Heart surgery 24-Feb-2023 11:04:22 nonrheumatic aortic valve stenosis Dinesh Chamorro

## 2023-02-24 NOTE — H&P PST ADULT - NSICDXFAMILYHX_GEN_ALL_CORE_FT
FAMILY HISTORY:  Father  Still living? No  Family history of rectal cancer, Age at diagnosis: Age Unknown

## 2023-02-25 LAB
MRSA PCR RESULT.: SIGNIFICANT CHANGE UP
S AUREUS DNA NOSE QL NAA+PROBE: SIGNIFICANT CHANGE UP

## 2023-02-28 PROBLEM — M54.9 DORSALGIA, UNSPECIFIED: Chronic | Status: ACTIVE | Noted: 2023-02-24

## 2023-02-28 PROBLEM — H04.123 DRY EYE SYNDROME OF BILATERAL LACRIMAL GLANDS: Chronic | Status: ACTIVE | Noted: 2023-02-24

## 2023-02-28 PROBLEM — R06.83 SNORING: Chronic | Status: ACTIVE | Noted: 2023-02-24

## 2023-02-28 PROBLEM — J34.2 DEVIATED NASAL SEPTUM: Chronic | Status: ACTIVE | Noted: 2023-02-24

## 2023-02-28 PROBLEM — Z87.898 PERSONAL HISTORY OF OTHER SPECIFIED CONDITIONS: Chronic | Status: ACTIVE | Noted: 2023-02-24

## 2023-03-01 ENCOUNTER — OUTPATIENT (OUTPATIENT)
Dept: OUTPATIENT SERVICES | Facility: HOSPITAL | Age: 58
LOS: 1 days | End: 2023-03-01
Payer: COMMERCIAL

## 2023-03-01 DIAGNOSIS — Z92.89 PERSONAL HISTORY OF OTHER MEDICAL TREATMENT: Chronic | ICD-10-CM

## 2023-03-01 DIAGNOSIS — Z87.74 PERSONAL HISTORY OF (CORRECTED) CONGENITAL MALFORMATIONS OF HEART AND CIRCULATORY SYSTEM: Chronic | ICD-10-CM

## 2023-03-01 DIAGNOSIS — Z11.52 ENCOUNTER FOR SCREENING FOR COVID-19: ICD-10-CM

## 2023-03-01 LAB — SARS-COV-2 RNA SPEC QL NAA+PROBE: SIGNIFICANT CHANGE UP

## 2023-03-01 PROCEDURE — C9803: CPT

## 2023-03-01 PROCEDURE — U0003: CPT

## 2023-03-01 PROCEDURE — U0005: CPT

## 2023-03-02 ENCOUNTER — TRANSCRIPTION ENCOUNTER (OUTPATIENT)
Age: 58
End: 2023-03-02

## 2023-03-03 ENCOUNTER — APPOINTMENT (OUTPATIENT)
Dept: CARDIOTHORACIC SURGERY | Facility: HOSPITAL | Age: 58
End: 2023-03-03

## 2023-03-03 ENCOUNTER — RESULT REVIEW (OUTPATIENT)
Age: 58
End: 2023-03-03

## 2023-03-03 ENCOUNTER — INPATIENT (INPATIENT)
Facility: HOSPITAL | Age: 58
LOS: 5 days | Discharge: HOME HEALTH | DRG: 220 | End: 2023-03-09
Attending: THORACIC SURGERY (CARDIOTHORACIC VASCULAR SURGERY) | Admitting: THORACIC SURGERY (CARDIOTHORACIC VASCULAR SURGERY)
Payer: COMMERCIAL

## 2023-03-03 VITALS
HEIGHT: 70 IN | WEIGHT: 205.25 LBS | DIASTOLIC BLOOD PRESSURE: 80 MMHG | OXYGEN SATURATION: 97 % | HEART RATE: 85 BPM | SYSTOLIC BLOOD PRESSURE: 117 MMHG | RESPIRATION RATE: 18 BRPM | TEMPERATURE: 98 F

## 2023-03-03 DIAGNOSIS — Z87.74 PERSONAL HISTORY OF (CORRECTED) CONGENITAL MALFORMATIONS OF HEART AND CIRCULATORY SYSTEM: Chronic | ICD-10-CM

## 2023-03-03 DIAGNOSIS — Z92.89 PERSONAL HISTORY OF OTHER MEDICAL TREATMENT: Chronic | ICD-10-CM

## 2023-03-03 DIAGNOSIS — I35.0 NONRHEUMATIC AORTIC (VALVE) STENOSIS: ICD-10-CM

## 2023-03-03 LAB
ALBUMIN SERPL ELPH-MCNC: 3.5 G/DL — SIGNIFICANT CHANGE UP (ref 3.3–5)
ALP SERPL-CCNC: 113 U/L — SIGNIFICANT CHANGE UP (ref 40–120)
ALT FLD-CCNC: 14 U/L — SIGNIFICANT CHANGE UP (ref 10–45)
ANION GAP SERPL CALC-SCNC: 13 MMOL/L — SIGNIFICANT CHANGE UP (ref 5–17)
APTT BLD: 34 SEC — SIGNIFICANT CHANGE UP (ref 27.5–35.5)
AST SERPL-CCNC: 28 U/L — SIGNIFICANT CHANGE UP (ref 10–40)
BASE EXCESS BLDV CALC-SCNC: -0.5 MMOL/L — SIGNIFICANT CHANGE UP (ref -2–3)
BASE EXCESS BLDV CALC-SCNC: -1.5 MMOL/L — SIGNIFICANT CHANGE UP (ref -2–3)
BASE EXCESS BLDV CALC-SCNC: -1.7 MMOL/L — SIGNIFICANT CHANGE UP (ref -2–3)
BASE EXCESS BLDV CALC-SCNC: -2.1 MMOL/L — LOW (ref -2–3)
BASE EXCESS BLDV CALC-SCNC: -2.1 MMOL/L — LOW (ref -2–3)
BASE EXCESS BLDV CALC-SCNC: -2.4 MMOL/L — LOW (ref -2–3)
BASE EXCESS BLDV CALC-SCNC: 0.3 MMOL/L — SIGNIFICANT CHANGE UP (ref -2–3)
BASOPHILS # BLD AUTO: 0 K/UL — SIGNIFICANT CHANGE UP (ref 0–0.2)
BASOPHILS NFR BLD AUTO: 0 % — SIGNIFICANT CHANGE UP (ref 0–2)
BILIRUB SERPL-MCNC: 0.9 MG/DL — SIGNIFICANT CHANGE UP (ref 0.2–1.2)
BLOOD GAS VENOUS - CREATININE: SIGNIFICANT CHANGE UP MG/DL (ref 0.5–1.3)
BUN SERPL-MCNC: 16 MG/DL — SIGNIFICANT CHANGE UP (ref 7–23)
CA-I SERPL-SCNC: 0.93 MMOL/L — LOW (ref 1.15–1.33)
CA-I SERPL-SCNC: 0.93 MMOL/L — LOW (ref 1.15–1.33)
CA-I SERPL-SCNC: 0.98 MMOL/L — LOW (ref 1.15–1.33)
CA-I SERPL-SCNC: 1.02 MMOL/L — LOW (ref 1.15–1.33)
CALCIUM SERPL-MCNC: 9.2 MG/DL — SIGNIFICANT CHANGE UP (ref 8.4–10.5)
CHLORIDE BLDV-SCNC: 100 MMOL/L — SIGNIFICANT CHANGE UP (ref 96–108)
CHLORIDE BLDV-SCNC: 100 MMOL/L — SIGNIFICANT CHANGE UP (ref 96–108)
CHLORIDE BLDV-SCNC: 101 MMOL/L — SIGNIFICANT CHANGE UP (ref 96–108)
CHLORIDE BLDV-SCNC: 101 MMOL/L — SIGNIFICANT CHANGE UP (ref 96–108)
CHLORIDE SERPL-SCNC: 103 MMOL/L — SIGNIFICANT CHANGE UP (ref 96–108)
CK MB BLD-MCNC: 15.3 % — HIGH (ref 0–3.5)
CK MB CFR SERPL CALC: 30.5 NG/ML — HIGH (ref 0–6.7)
CK SERPL-CCNC: 199 U/L — SIGNIFICANT CHANGE UP (ref 30–200)
CO2 BLDV-SCNC: 24 MMOL/L — SIGNIFICANT CHANGE UP (ref 22–26)
CO2 BLDV-SCNC: 24 MMOL/L — SIGNIFICANT CHANGE UP (ref 22–26)
CO2 BLDV-SCNC: 25 MMOL/L — SIGNIFICANT CHANGE UP (ref 22–26)
CO2 BLDV-SCNC: 26 MMOL/L — SIGNIFICANT CHANGE UP (ref 22–26)
CO2 BLDV-SCNC: 27 MMOL/L — HIGH (ref 22–26)
CO2 BLDV-SCNC: 28 MMOL/L — HIGH (ref 22–26)
CO2 BLDV-SCNC: 29 MMOL/L — HIGH (ref 22–26)
CO2 SERPL-SCNC: 24 MMOL/L — SIGNIFICANT CHANGE UP (ref 22–31)
CREAT SERPL-MCNC: 1.23 MG/DL — SIGNIFICANT CHANGE UP (ref 0.5–1.3)
EGFR: 68 ML/MIN/1.73M2 — SIGNIFICANT CHANGE UP
EOSINOPHIL # BLD AUTO: 0.27 K/UL — SIGNIFICANT CHANGE UP (ref 0–0.5)
EOSINOPHIL NFR BLD AUTO: 0.8 % — SIGNIFICANT CHANGE UP (ref 0–6)
FIBRINOGEN PPP-MCNC: 219 MG/DL — SIGNIFICANT CHANGE UP (ref 200–445)
GAS PNL BLDA: SIGNIFICANT CHANGE UP
GAS PNL BLDV: 132 MMOL/L — LOW (ref 136–145)
GAS PNL BLDV: 136 MMOL/L — SIGNIFICANT CHANGE UP (ref 136–145)
GAS PNL BLDV: 136 MMOL/L — SIGNIFICANT CHANGE UP (ref 136–145)
GAS PNL BLDV: 137 MMOL/L — SIGNIFICANT CHANGE UP (ref 136–145)
GAS PNL BLDV: SIGNIFICANT CHANGE UP
GLUCOSE BLDC GLUCOMTR-MCNC: 117 MG/DL — HIGH (ref 70–99)
GLUCOSE BLDC GLUCOMTR-MCNC: 122 MG/DL — HIGH (ref 70–99)
GLUCOSE BLDC GLUCOMTR-MCNC: 134 MG/DL — HIGH (ref 70–99)
GLUCOSE BLDC GLUCOMTR-MCNC: 136 MG/DL — HIGH (ref 70–99)
GLUCOSE BLDC GLUCOMTR-MCNC: 147 MG/DL — HIGH (ref 70–99)
GLUCOSE BLDC GLUCOMTR-MCNC: 173 MG/DL — HIGH (ref 70–99)
GLUCOSE BLDC GLUCOMTR-MCNC: 173 MG/DL — HIGH (ref 70–99)
GLUCOSE BLDC GLUCOMTR-MCNC: 202 MG/DL — HIGH (ref 70–99)
GLUCOSE BLDC GLUCOMTR-MCNC: 214 MG/DL — HIGH (ref 70–99)
GLUCOSE BLDV-MCNC: 125 MG/DL — HIGH (ref 70–99)
GLUCOSE BLDV-MCNC: 161 MG/DL — HIGH (ref 70–99)
GLUCOSE BLDV-MCNC: 161 MG/DL — HIGH (ref 70–99)
GLUCOSE BLDV-MCNC: 185 MG/DL — HIGH (ref 70–99)
GLUCOSE SERPL-MCNC: 144 MG/DL — HIGH (ref 70–99)
HCO3 BLDV-SCNC: 23 MMOL/L — SIGNIFICANT CHANGE UP (ref 22–29)
HCO3 BLDV-SCNC: 23 MMOL/L — SIGNIFICANT CHANGE UP (ref 22–29)
HCO3 BLDV-SCNC: 24 MMOL/L — SIGNIFICANT CHANGE UP (ref 22–29)
HCO3 BLDV-SCNC: 24 MMOL/L — SIGNIFICANT CHANGE UP (ref 22–29)
HCO3 BLDV-SCNC: 25 MMOL/L — SIGNIFICANT CHANGE UP (ref 22–29)
HCO3 BLDV-SCNC: 26 MMOL/L — SIGNIFICANT CHANGE UP (ref 22–29)
HCO3 BLDV-SCNC: 28 MMOL/L — SIGNIFICANT CHANGE UP (ref 22–29)
HCT VFR BLD CALC: 37.1 % — LOW (ref 39–50)
HCT VFR BLDA CALC: 32 % — LOW (ref 39–51)
HCT VFR BLDA CALC: 34 % — LOW (ref 39–51)
HCT VFR BLDA CALC: 34 % — LOW (ref 39–51)
HCT VFR BLDA CALC: 35 % — LOW (ref 39–51)
HGB BLD CALC-MCNC: 10.7 G/DL — LOW (ref 12.6–17.4)
HGB BLD CALC-MCNC: 11.3 G/DL — LOW (ref 12.6–17.4)
HGB BLD CALC-MCNC: 11.4 G/DL — LOW (ref 12.6–17.4)
HGB BLD CALC-MCNC: 11.6 G/DL — LOW (ref 12.6–17.4)
HGB BLD-MCNC: 12.9 G/DL — LOW (ref 13–17)
HOROWITZ INDEX BLDV+IHG-RTO: 50 — SIGNIFICANT CHANGE UP
INR BLD: 1.33 RATIO — HIGH (ref 0.88–1.16)
LACTATE BLDV-MCNC: 1 MMOL/L — SIGNIFICANT CHANGE UP (ref 0.5–2)
LACTATE BLDV-MCNC: 1.6 MMOL/L — SIGNIFICANT CHANGE UP (ref 0.5–2)
LACTATE BLDV-MCNC: 1.7 MMOL/L — SIGNIFICANT CHANGE UP (ref 0.5–2)
LACTATE BLDV-MCNC: 2.3 MMOL/L — HIGH (ref 0.5–2)
LYMPHOCYTES # BLD AUTO: 1.67 K/UL — SIGNIFICANT CHANGE UP (ref 1–3.3)
LYMPHOCYTES # BLD AUTO: 5 % — LOW (ref 13–44)
MAGNESIUM SERPL-MCNC: 2.9 MG/DL — HIGH (ref 1.6–2.6)
MANUAL SMEAR VERIFICATION: SIGNIFICANT CHANGE UP
MCHC RBC-ENTMCNC: 27.6 PG — SIGNIFICANT CHANGE UP (ref 27–34)
MCHC RBC-ENTMCNC: 34.8 GM/DL — SIGNIFICANT CHANGE UP (ref 32–36)
MCV RBC AUTO: 79.3 FL — LOW (ref 80–100)
MONOCYTES # BLD AUTO: 1.97 K/UL — HIGH (ref 0–0.9)
MONOCYTES NFR BLD AUTO: 5.9 % — SIGNIFICANT CHANGE UP (ref 2–14)
NEUTROPHILS # BLD AUTO: 29.45 K/UL — HIGH (ref 1.8–7.4)
NEUTROPHILS NFR BLD AUTO: 77.5 % — HIGH (ref 43–77)
NEUTS BAND # BLD: 10.8 % — HIGH (ref 0–8)
PCO2 BLDV: 37 MMHG — LOW (ref 42–55)
PCO2 BLDV: 42 MMHG — SIGNIFICANT CHANGE UP (ref 42–55)
PCO2 BLDV: 44 MMHG — SIGNIFICANT CHANGE UP (ref 42–55)
PCO2 BLDV: 45 MMHG — SIGNIFICANT CHANGE UP (ref 42–55)
PCO2 BLDV: 46 MMHG — SIGNIFICANT CHANGE UP (ref 42–55)
PCO2 BLDV: 56 MMHG — HIGH (ref 42–55)
PCO2 BLDV: 63 MMHG — HIGH (ref 42–55)
PH BLDV: 7.23 — LOW (ref 7.32–7.43)
PH BLDV: 7.3 — LOW (ref 7.32–7.43)
PH BLDV: 7.34 — SIGNIFICANT CHANGE UP (ref 7.32–7.43)
PH BLDV: 7.34 — SIGNIFICANT CHANGE UP (ref 7.32–7.43)
PH BLDV: 7.35 — SIGNIFICANT CHANGE UP (ref 7.32–7.43)
PH BLDV: 7.35 — SIGNIFICANT CHANGE UP (ref 7.32–7.43)
PH BLDV: 7.4 — SIGNIFICANT CHANGE UP (ref 7.32–7.43)
PHOSPHATE SERPL-MCNC: 2.1 MG/DL — LOW (ref 2.5–4.5)
PLAT MORPH BLD: NORMAL — SIGNIFICANT CHANGE UP
PLATELET # BLD AUTO: 214 K/UL — SIGNIFICANT CHANGE UP (ref 150–400)
PO2 BLDV: 115 MMHG — HIGH (ref 25–45)
PO2 BLDV: 118 MMHG — HIGH (ref 25–45)
PO2 BLDV: 43 MMHG — SIGNIFICANT CHANGE UP (ref 25–45)
PO2 BLDV: 48 MMHG — HIGH (ref 25–45)
PO2 BLDV: 52 MMHG — HIGH (ref 25–45)
PO2 BLDV: 61 MMHG — HIGH (ref 25–45)
PO2 BLDV: 67 MMHG — HIGH (ref 25–45)
POTASSIUM BLDV-SCNC: 4.4 MMOL/L — SIGNIFICANT CHANGE UP (ref 3.5–5.1)
POTASSIUM BLDV-SCNC: 4.4 MMOL/L — SIGNIFICANT CHANGE UP (ref 3.5–5.1)
POTASSIUM BLDV-SCNC: 4.6 MMOL/L — SIGNIFICANT CHANGE UP (ref 3.5–5.1)
POTASSIUM BLDV-SCNC: 4.6 MMOL/L — SIGNIFICANT CHANGE UP (ref 3.5–5.1)
POTASSIUM SERPL-MCNC: 4.1 MMOL/L — SIGNIFICANT CHANGE UP (ref 3.5–5.3)
POTASSIUM SERPL-SCNC: 4.1 MMOL/L — SIGNIFICANT CHANGE UP (ref 3.5–5.3)
PROT SERPL-MCNC: 5.3 G/DL — LOW (ref 6–8.3)
PROTHROM AB SERPL-ACNC: 15.5 SEC — HIGH (ref 10.5–13.4)
RBC # BLD: 4.68 M/UL — SIGNIFICANT CHANGE UP (ref 4.2–5.8)
RBC # FLD: 12.5 % — SIGNIFICANT CHANGE UP (ref 10.3–14.5)
RBC BLD AUTO: SIGNIFICANT CHANGE UP
SAO2 % BLDV: 76.4 % — SIGNIFICANT CHANGE UP (ref 67–88)
SAO2 % BLDV: 77.7 % — SIGNIFICANT CHANGE UP (ref 67–88)
SAO2 % BLDV: 85 % — SIGNIFICANT CHANGE UP (ref 67–88)
SAO2 % BLDV: 91 % — HIGH (ref 67–88)
SAO2 % BLDV: 94.5 % — HIGH (ref 67–88)
SAO2 % BLDV: 98.9 % — HIGH (ref 67–88)
SAO2 % BLDV: 99 % — HIGH (ref 67–88)
SODIUM SERPL-SCNC: 140 MMOL/L — SIGNIFICANT CHANGE UP (ref 135–145)
TROPONIN T, HIGH SENSITIVITY RESULT: 328 NG/L — HIGH (ref 0–51)
WBC # BLD: 33.35 K/UL — HIGH (ref 3.8–10.5)
WBC # FLD AUTO: 33.35 K/UL — HIGH (ref 3.8–10.5)

## 2023-03-03 PROCEDURE — 88305 TISSUE EXAM BY PATHOLOGIST: CPT | Mod: 26

## 2023-03-03 PROCEDURE — 99291 CRITICAL CARE FIRST HOUR: CPT | Mod: 25

## 2023-03-03 PROCEDURE — 33405 REPLACEMENT AORTIC VALVE OPN: CPT

## 2023-03-03 PROCEDURE — 93010 ELECTROCARDIOGRAM REPORT: CPT

## 2023-03-03 PROCEDURE — 71045 X-RAY EXAM CHEST 1 VIEW: CPT | Mod: 26

## 2023-03-03 PROCEDURE — 33405 REPLACEMENT AORTIC VALVE OPN: CPT | Mod: AS

## 2023-03-03 DEVICE — LIGATING CLIPS WECK HORIZON MEDIUM (BLUE) 24: Type: IMPLANTABLE DEVICE | Status: FUNCTIONAL

## 2023-03-03 DEVICE — IMPLANTABLE DEVICE: Type: IMPLANTABLE DEVICE | Status: FUNCTIONAL

## 2023-03-03 DEVICE — CANNULA VENOUS 3 STAGE STANDARD 29/37/37FR X 1/2" NON-VENTED: Type: IMPLANTABLE DEVICE | Status: FUNCTIONAL

## 2023-03-03 DEVICE — KIT CVC 2LUM MAC 9FR CHG: Type: IMPLANTABLE DEVICE | Status: FUNCTIONAL

## 2023-03-03 DEVICE — SURGICEL 4 X 8": Type: IMPLANTABLE DEVICE | Status: FUNCTIONAL

## 2023-03-03 DEVICE — FELT PTFE 6 X 6": Type: IMPLANTABLE DEVICE | Status: FUNCTIONAL

## 2023-03-03 DEVICE — CATH VENT VENTRICULAR PVC 18FR X 4.25" TIP PERFORATION: Type: IMPLANTABLE DEVICE | Status: FUNCTIONAL

## 2023-03-03 DEVICE — LIGATING CLIPS WECK HORIZON SMALL-WIDE (RED) 24: Type: IMPLANTABLE DEVICE | Status: FUNCTIONAL

## 2023-03-03 DEVICE — COR-KNOT MINI DEVICE COMBO KIT: Type: IMPLANTABLE DEVICE | Status: FUNCTIONAL

## 2023-03-03 DEVICE — CANNULA ARTERIAL CURVED TIP 22FR X 3/8": Type: IMPLANTABLE DEVICE | Status: FUNCTIONAL

## 2023-03-03 DEVICE — CHEST DRAIN THORACIC ARGYLE PVC 28FR STRAIGHT: Type: IMPLANTABLE DEVICE | Status: FUNCTIONAL

## 2023-03-03 DEVICE — VALVE AORTIC INSPIRIS RESILIA 27MM: Type: IMPLANTABLE DEVICE | Status: FUNCTIONAL

## 2023-03-03 DEVICE — CANNULA RCSP 15FR X 12.5" MANUAL-INFLATE CUFF WITH SOLID STYLET: Type: IMPLANTABLE DEVICE | Status: FUNCTIONAL

## 2023-03-03 DEVICE — MEDIASTINAL CATH DRAIN 9MM: Type: IMPLANTABLE DEVICE | Status: FUNCTIONAL

## 2023-03-03 DEVICE — CANNULA CORONARY STR SELF INFLATING 3.0MM: Type: IMPLANTABLE DEVICE | Status: FUNCTIONAL

## 2023-03-03 DEVICE — KIT CVC 1LUM 16GX20CM BLU FLX TIP: Type: IMPLANTABLE DEVICE | Status: FUNCTIONAL

## 2023-03-03 DEVICE — COR-KNOT QUICK LOAD SINGLES: Type: IMPLANTABLE DEVICE | Status: FUNCTIONAL

## 2023-03-03 DEVICE — KIT A-LINE 1LUM 20G X 12CM SAFE KIT: Type: IMPLANTABLE DEVICE | Status: FUNCTIONAL

## 2023-03-03 DEVICE — SURGICEL NU-KNIT 3 X 4": Type: IMPLANTABLE DEVICE | Status: FUNCTIONAL

## 2023-03-03 DEVICE — CANNULA CORONARY RT ANG PERFUSION 7MM: Type: IMPLANTABLE DEVICE | Status: FUNCTIONAL

## 2023-03-03 RX ORDER — DOBUTAMINE HCL 250MG/20ML
3 VIAL (ML) INTRAVENOUS
Qty: 500 | Refills: 0 | Status: DISCONTINUED | OUTPATIENT
Start: 2023-03-03 | End: 2023-03-03

## 2023-03-03 RX ORDER — CHLORHEXIDINE GLUCONATE 213 G/1000ML
5 SOLUTION TOPICAL
Refills: 0 | Status: DISCONTINUED | OUTPATIENT
Start: 2023-03-03 | End: 2023-03-03

## 2023-03-03 RX ORDER — POLYETHYLENE GLYCOL 3350 17 G/17G
17 POWDER, FOR SOLUTION ORAL DAILY
Refills: 0 | Status: DISCONTINUED | OUTPATIENT
Start: 2023-03-04 | End: 2023-03-09

## 2023-03-03 RX ORDER — OXYCODONE HYDROCHLORIDE 5 MG/1
5 TABLET ORAL EVERY 4 HOURS
Refills: 0 | Status: DISCONTINUED | OUTPATIENT
Start: 2023-03-03 | End: 2023-03-09

## 2023-03-03 RX ORDER — ACETAMINOPHEN 500 MG
1000 TABLET ORAL ONCE
Refills: 0 | Status: COMPLETED | OUTPATIENT
Start: 2023-03-03 | End: 2023-03-03

## 2023-03-03 RX ORDER — ALBUMIN HUMAN 25 %
250 VIAL (ML) INTRAVENOUS
Refills: 0 | Status: DISCONTINUED | OUTPATIENT
Start: 2023-03-03 | End: 2023-03-03

## 2023-03-03 RX ORDER — CHLORHEXIDINE GLUCONATE 213 G/1000ML
15 SOLUTION TOPICAL EVERY 12 HOURS
Refills: 0 | Status: DISCONTINUED | OUTPATIENT
Start: 2023-03-03 | End: 2023-03-03

## 2023-03-03 RX ORDER — HYDROMORPHONE HYDROCHLORIDE 2 MG/ML
0.5 INJECTION INTRAMUSCULAR; INTRAVENOUS; SUBCUTANEOUS ONCE
Refills: 0 | Status: DISCONTINUED | OUTPATIENT
Start: 2023-03-03 | End: 2023-03-03

## 2023-03-03 RX ORDER — POTASSIUM CHLORIDE 20 MEQ
10 PACKET (EA) ORAL
Refills: 0 | Status: DISCONTINUED | OUTPATIENT
Start: 2023-03-03 | End: 2023-03-05

## 2023-03-03 RX ORDER — MEPERIDINE HYDROCHLORIDE 50 MG/ML
25 INJECTION INTRAMUSCULAR; INTRAVENOUS; SUBCUTANEOUS ONCE
Refills: 0 | Status: DISCONTINUED | OUTPATIENT
Start: 2023-03-03 | End: 2023-03-03

## 2023-03-03 RX ORDER — ATORVASTATIN CALCIUM 80 MG/1
1 TABLET, FILM COATED ORAL
Qty: 0 | Refills: 0 | DISCHARGE

## 2023-03-03 RX ORDER — DEXTROSE 50 % IN WATER 50 %
25 SYRINGE (ML) INTRAVENOUS
Refills: 0 | Status: DISCONTINUED | OUTPATIENT
Start: 2023-03-03 | End: 2023-03-03

## 2023-03-03 RX ORDER — ATORVASTATIN CALCIUM 80 MG/1
80 TABLET, FILM COATED ORAL AT BEDTIME
Refills: 0 | Status: DISCONTINUED | OUTPATIENT
Start: 2023-03-03 | End: 2023-03-09

## 2023-03-03 RX ORDER — SODIUM CHLORIDE 9 MG/ML
3 INJECTION INTRAMUSCULAR; INTRAVENOUS; SUBCUTANEOUS EVERY 8 HOURS
Refills: 0 | Status: DISCONTINUED | OUTPATIENT
Start: 2023-03-03 | End: 2023-03-03

## 2023-03-03 RX ORDER — POTASSIUM CHLORIDE 20 MEQ
10 PACKET (EA) ORAL
Refills: 0 | Status: COMPLETED | OUTPATIENT
Start: 2023-03-03 | End: 2023-03-03

## 2023-03-03 RX ORDER — ALBUMIN HUMAN 25 %
250 VIAL (ML) INTRAVENOUS
Refills: 0 | Status: COMPLETED | OUTPATIENT
Start: 2023-03-03 | End: 2023-03-03

## 2023-03-03 RX ORDER — POTASSIUM CHLORIDE 20 MEQ
10 PACKET (EA) ORAL ONCE
Refills: 0 | Status: DISCONTINUED | OUTPATIENT
Start: 2023-03-03 | End: 2023-03-05

## 2023-03-03 RX ORDER — ALBUMIN HUMAN 25 %
250 VIAL (ML) INTRAVENOUS ONCE
Refills: 0 | Status: DISCONTINUED | OUTPATIENT
Start: 2023-03-03 | End: 2023-03-05

## 2023-03-03 RX ORDER — CEFUROXIME AXETIL 250 MG
1500 TABLET ORAL EVERY 8 HOURS
Refills: 0 | Status: COMPLETED | OUTPATIENT
Start: 2023-03-03 | End: 2023-03-05

## 2023-03-03 RX ORDER — HYDROMORPHONE HYDROCHLORIDE 2 MG/ML
0.5 INJECTION INTRAMUSCULAR; INTRAVENOUS; SUBCUTANEOUS EVERY 6 HOURS
Refills: 0 | Status: DISCONTINUED | OUTPATIENT
Start: 2023-03-03 | End: 2023-03-05

## 2023-03-03 RX ORDER — VASOPRESSIN 20 [USP'U]/ML
0.07 INJECTION INTRAVENOUS
Qty: 40 | Refills: 0 | Status: DISCONTINUED | OUTPATIENT
Start: 2023-03-03 | End: 2023-03-04

## 2023-03-03 RX ORDER — GABAPENTIN 400 MG/1
100 CAPSULE ORAL EVERY 8 HOURS
Refills: 0 | Status: COMPLETED | OUTPATIENT
Start: 2023-03-03 | End: 2023-03-08

## 2023-03-03 RX ORDER — ONDANSETRON 8 MG/1
4 TABLET, FILM COATED ORAL ONCE
Refills: 0 | Status: COMPLETED | OUTPATIENT
Start: 2023-03-03 | End: 2023-03-03

## 2023-03-03 RX ORDER — OXYCODONE HYDROCHLORIDE 5 MG/1
10 TABLET ORAL EVERY 4 HOURS
Refills: 0 | Status: DISCONTINUED | OUTPATIENT
Start: 2023-03-03 | End: 2023-03-09

## 2023-03-03 RX ORDER — DEXMEDETOMIDINE HYDROCHLORIDE IN 0.9% SODIUM CHLORIDE 4 UG/ML
0.5 INJECTION INTRAVENOUS
Qty: 200 | Refills: 0 | Status: DISCONTINUED | OUTPATIENT
Start: 2023-03-03 | End: 2023-03-03

## 2023-03-03 RX ORDER — AMIODARONE HYDROCHLORIDE 400 MG/1
400 TABLET ORAL
Refills: 0 | Status: DISCONTINUED | OUTPATIENT
Start: 2023-03-03 | End: 2023-03-03

## 2023-03-03 RX ORDER — INSULIN HUMAN 100 [IU]/ML
3 INJECTION, SOLUTION SUBCUTANEOUS
Qty: 100 | Refills: 0 | Status: DISCONTINUED | OUTPATIENT
Start: 2023-03-03 | End: 2023-03-05

## 2023-03-03 RX ORDER — ASCORBIC ACID 60 MG
500 TABLET,CHEWABLE ORAL
Refills: 0 | Status: COMPLETED | OUTPATIENT
Start: 2023-03-03 | End: 2023-03-08

## 2023-03-03 RX ORDER — ACETAMINOPHEN 500 MG
650 TABLET ORAL EVERY 6 HOURS
Refills: 0 | Status: COMPLETED | OUTPATIENT
Start: 2023-03-03 | End: 2023-03-06

## 2023-03-03 RX ORDER — ALBUMIN HUMAN 25 %
250 VIAL (ML) INTRAVENOUS ONCE
Refills: 0 | Status: DISCONTINUED | OUTPATIENT
Start: 2023-03-03 | End: 2023-03-03

## 2023-03-03 RX ORDER — SODIUM CHLORIDE 9 MG/ML
1000 INJECTION INTRAMUSCULAR; INTRAVENOUS; SUBCUTANEOUS
Refills: 0 | Status: DISCONTINUED | OUTPATIENT
Start: 2023-03-03 | End: 2023-03-09

## 2023-03-03 RX ORDER — LIDOCAINE HCL 20 MG/ML
0.2 VIAL (ML) INJECTION ONCE
Refills: 0 | Status: DISCONTINUED | OUTPATIENT
Start: 2023-03-03 | End: 2023-03-03

## 2023-03-03 RX ORDER — NOREPINEPHRINE BITARTRATE/D5W 8 MG/250ML
0.05 PLASTIC BAG, INJECTION (ML) INTRAVENOUS
Qty: 8 | Refills: 0 | Status: DISCONTINUED | OUTPATIENT
Start: 2023-03-03 | End: 2023-03-03

## 2023-03-03 RX ORDER — METOPROLOL TARTRATE 50 MG
12.5 TABLET ORAL ONCE
Refills: 0 | Status: COMPLETED | OUTPATIENT
Start: 2023-03-03 | End: 2023-03-03

## 2023-03-03 RX ORDER — ACETAMINOPHEN 500 MG
650 TABLET ORAL EVERY 6 HOURS
Refills: 0 | Status: COMPLETED | OUTPATIENT
Start: 2023-03-06 | End: 2024-02-02

## 2023-03-03 RX ORDER — GABAPENTIN 400 MG/1
300 CAPSULE ORAL ONCE
Refills: 0 | Status: COMPLETED | OUTPATIENT
Start: 2023-03-03 | End: 2023-03-03

## 2023-03-03 RX ORDER — SENNA PLUS 8.6 MG/1
2 TABLET ORAL AT BEDTIME
Refills: 0 | Status: DISCONTINUED | OUTPATIENT
Start: 2023-03-04 | End: 2023-03-09

## 2023-03-03 RX ORDER — CHLORHEXIDINE GLUCONATE 213 G/1000ML
1 SOLUTION TOPICAL DAILY
Refills: 0 | Status: DISCONTINUED | OUTPATIENT
Start: 2023-03-03 | End: 2023-03-09

## 2023-03-03 RX ORDER — DOBUTAMINE HCL 250MG/20ML
1.25 VIAL (ML) INTRAVENOUS
Qty: 500 | Refills: 0 | Status: DISCONTINUED | OUTPATIENT
Start: 2023-03-03 | End: 2023-03-04

## 2023-03-03 RX ORDER — ASPIRIN/CALCIUM CARB/MAGNESIUM 324 MG
81 TABLET ORAL DAILY
Refills: 0 | Status: DISCONTINUED | OUTPATIENT
Start: 2023-03-03 | End: 2023-03-09

## 2023-03-03 RX ORDER — CALCIUM GLUCONATE 100 MG/ML
2 VIAL (ML) INTRAVENOUS ONCE
Refills: 0 | Status: COMPLETED | OUTPATIENT
Start: 2023-03-03 | End: 2023-03-03

## 2023-03-03 RX ORDER — CHLORHEXIDINE GLUCONATE 213 G/1000ML
1 SOLUTION TOPICAL ONCE
Refills: 0 | Status: DISCONTINUED | OUTPATIENT
Start: 2023-03-03 | End: 2023-03-03

## 2023-03-03 RX ORDER — DEXTROSE 50 % IN WATER 50 %
50 SYRINGE (ML) INTRAVENOUS
Refills: 0 | Status: DISCONTINUED | OUTPATIENT
Start: 2023-03-03 | End: 2023-03-09

## 2023-03-03 RX ORDER — FAMOTIDINE 10 MG/ML
20 INJECTION INTRAVENOUS EVERY 12 HOURS
Refills: 0 | Status: DISCONTINUED | OUTPATIENT
Start: 2023-03-03 | End: 2023-03-04

## 2023-03-03 RX ADMIN — Medication 125 MILLILITER(S): at 13:30

## 2023-03-03 RX ADMIN — CHLORHEXIDINE GLUCONATE 5 MILLILITER(S): 213 SOLUTION TOPICAL at 17:39

## 2023-03-03 RX ADMIN — Medication 63.75 MILLIMOLE(S): at 17:39

## 2023-03-03 RX ADMIN — Medication 125 MILLILITER(S): at 17:23

## 2023-03-03 RX ADMIN — HYDROMORPHONE HYDROCHLORIDE 0.5 MILLIGRAM(S): 2 INJECTION INTRAMUSCULAR; INTRAVENOUS; SUBCUTANEOUS at 15:35

## 2023-03-03 RX ADMIN — Medication 12.5 MILLIGRAM(S): at 06:34

## 2023-03-03 RX ADMIN — Medication 100 MILLIEQUIVALENT(S): at 19:13

## 2023-03-03 RX ADMIN — Medication 1000 MILLIGRAM(S): at 06:35

## 2023-03-03 RX ADMIN — Medication 100 MILLIEQUIVALENT(S): at 19:37

## 2023-03-03 RX ADMIN — HYDROMORPHONE HYDROCHLORIDE 0.5 MILLIGRAM(S): 2 INJECTION INTRAMUSCULAR; INTRAVENOUS; SUBCUTANEOUS at 21:05

## 2023-03-03 RX ADMIN — Medication 200 GRAM(S): at 14:27

## 2023-03-03 RX ADMIN — Medication 81 MILLIGRAM(S): at 22:07

## 2023-03-03 RX ADMIN — Medication 100 MILLIGRAM(S): at 21:31

## 2023-03-03 RX ADMIN — Medication 100 MILLIEQUIVALENT(S): at 22:08

## 2023-03-03 RX ADMIN — ONDANSETRON 4 MILLIGRAM(S): 8 TABLET, FILM COATED ORAL at 15:44

## 2023-03-03 RX ADMIN — Medication 1000 MILLIGRAM(S): at 07:05

## 2023-03-03 RX ADMIN — ATORVASTATIN CALCIUM 80 MILLIGRAM(S): 80 TABLET, FILM COATED ORAL at 22:07

## 2023-03-03 RX ADMIN — Medication 125 MILLILITER(S): at 17:00

## 2023-03-03 RX ADMIN — HYDROMORPHONE HYDROCHLORIDE 0.5 MILLIGRAM(S): 2 INJECTION INTRAMUSCULAR; INTRAVENOUS; SUBCUTANEOUS at 15:50

## 2023-03-03 RX ADMIN — GABAPENTIN 300 MILLIGRAM(S): 400 CAPSULE ORAL at 06:35

## 2023-03-03 RX ADMIN — Medication 125 MILLILITER(S): at 14:18

## 2023-03-03 RX ADMIN — CHLORHEXIDINE GLUCONATE 1 APPLICATION(S): 213 SOLUTION TOPICAL at 17:39

## 2023-03-03 RX ADMIN — Medication 125 MILLILITER(S): at 17:32

## 2023-03-03 RX ADMIN — Medication 125 MILLILITER(S): at 16:48

## 2023-03-03 RX ADMIN — Medication 100 MILLIEQUIVALENT(S): at 18:30

## 2023-03-03 RX ADMIN — INSULIN HUMAN 3 UNIT(S)/HR: 100 INJECTION, SOLUTION SUBCUTANEOUS at 19:13

## 2023-03-03 RX ADMIN — Medication 125 MILLILITER(S): at 13:45

## 2023-03-03 RX ADMIN — GABAPENTIN 100 MILLIGRAM(S): 400 CAPSULE ORAL at 22:07

## 2023-03-03 RX ADMIN — SODIUM CHLORIDE 10 MILLILITER(S): 9 INJECTION INTRAMUSCULAR; INTRAVENOUS; SUBCUTANEOUS at 19:14

## 2023-03-03 RX ADMIN — Medication 100 MILLIEQUIVALENT(S): at 18:15

## 2023-03-03 RX ADMIN — Medication 650 MILLIGRAM(S): at 23:49

## 2023-03-03 RX ADMIN — Medication 100 MILLIEQUIVALENT(S): at 18:00

## 2023-03-03 RX ADMIN — Medication 3.49 MICROGRAM(S)/KG/MIN: at 20:49

## 2023-03-03 RX ADMIN — Medication 125 MILLILITER(S): at 16:37

## 2023-03-03 RX ADMIN — HYDROMORPHONE HYDROCHLORIDE 0.5 MILLIGRAM(S): 2 INJECTION INTRAMUSCULAR; INTRAVENOUS; SUBCUTANEOUS at 20:50

## 2023-03-03 RX ADMIN — ONDANSETRON 4 MILLIGRAM(S): 8 TABLET, FILM COATED ORAL at 21:30

## 2023-03-03 RX ADMIN — FAMOTIDINE 20 MILLIGRAM(S): 10 INJECTION INTRAVENOUS at 17:43

## 2023-03-03 RX ADMIN — VASOPRESSIN 10.5 UNIT(S)/MIN: 20 INJECTION INTRAVENOUS at 19:13

## 2023-03-03 RX ADMIN — Medication 8.73 MICROGRAM(S)/KG/MIN: at 19:13

## 2023-03-03 NOTE — PRE-ANESTHESIA EVALUATION ADULT - NSANTHPMHFT_GEN_ALL_CORE
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 His medical history significant for HTN, GERD, hyperlipidemia, bicuspid aortic valve, deviated nasal septum, lower back pain.

## 2023-03-03 NOTE — PROGRESS NOTE ADULT - SUBJECTIVE AND OBJECTIVE BOX
Patient seen and examined at the bedside.    Remained critically ill on continuous ICU monitoring.    OBJECTIVE:  Vital Signs Last 24 Hrs  T(C): 37.4 (03 Mar 2023 16:00), Max: 37.4 (03 Mar 2023 16:00)  T(F): 99.3 (03 Mar 2023 16:00), Max: 99.3 (03 Mar 2023 16:00)  HR: 80 (03 Mar 2023 17:27) (69 - 85)  BP: 117/80 (03 Mar 2023 06:55) (117/80 - 117/80)  BP(mean): --  RR: 25 (03 Mar 2023 17:27) (14 - 30)  SpO2: 96% (03 Mar 2023 17:27) (94% - 100%)    Parameters below as of 03 Mar 2023 17:27  Patient On (Oxygen Delivery Method): nasal cannula, high flow,heater temp 31  O2 Flow (L/min): 50  O2 Concentration (%): 50    REVIEW OF SYSTEMS:  Gen: No fever  EYES/ENT: No visual changes;  No vertigo or throat pain   NECK: No pain   RES:  No shortness of breath or Cough  CARD: No chest pain   GI: No abdominal pain  : No dysuria  NEURO: No weakness  SKIN: No itching, rashes     Physical Exam:  General: intubated multiple lines gtt & tubes   Neurology: Nonfocal  Eyes: bilaeral pupils equal and reactive   ENT/Neck: +ETT midline, Neck supple, trachea midline, No JVD   Respiratory: Rales noted bilaterally   CV: RRR, S1S2, no murmurs        [x] Sternal dressing, [x] Mediastinal CT        [x] Paced rhythm, [x] Temporary pacing- DDD- 100  Abdominal: Soft, NT, ND +BS,   Extremities: 1-2+ pedal edema noted, + peripheral pulses   Skin: No Rashes, Hematoma, Ecchymosis                           Assessment:    Plan:   ***Neuro***  [x] Nonfocal   Post operative neuro assessment   Gabapentin, oxy, meperidine and Dilaudid    ***Cardiovascular***  Invasive hemodynamic monitoring, assess perfusion indices   SR / CVP 9 / MAP 67 / SvO2 ___ / Hct 37.1 / Lactate 4.0  [x] Levophed 0.05 mcgs [x] Vasopressin 0.07 units    Volume: [x] Albumin 6  Reassessment of hemodynamics post resuscitation   Monitor chest tube outputs   Amiodarone for afib prophylaxis   [] Bleeding ___ / [] Nonbleeding ___   [x] ASA   Serial EKG and cardiac enzymes     ***Pulmonary***  Post op vent management  Titration of FiO2 and PEEP, follow SpO2, CXR, blood gases     Mode: CPAP with PS  FiO2: 40  PEEP: 5  PS: 5  MAP: 7  PIP: 10              Will plan to wean & extubate once pt is hemodynamically stable and not bleeding    ***GI***  Diet: NPO  [x] Pepcid    ***Renal***  GFR 68  Continue to monitor I/Os, BUN/Creatinine.   Replete lytes PRN  Fields present [x] positive     ***ID***  Perioperative antibiotics     ***Endocrine***  [x] Stress Hyperglycemia: HbA1c 5.9%              - [x] Insulin gtt               - Need tight glycemic control to prevent wound infection.    Patient requires continuous monitoring with bedside rhythm monitoring, pulse oximetry monitoring, and continuous central venous and arterial pressure monitoring; and intermittent blood gas analysis. Care plan discussed with the ICU care team.   Patient remained critical, at risk for life threatening decompensation.    I have spent 40 minutes providing critical care management to this patient.    By signing my name below, I, Marya Márquez, attest that this documentation has been prepared under the direction and in the presence of Paige Byrd MD   Electronically signed: Dario Bhatt, 03-03-23 @ 17:36    I, Paige Byrd, personally performed the services described in this documentation. all medical record entries made by the crystalibsebastián were at my direction and in my presence. I have reviewed the chart and agree that the record reflects my personal performance and is accurate and complete  Electronically signed: Paige Byrd MD  Patient seen and examined at the bedside.    Remained critically ill on continuous ICU monitoring.    OBJECTIVE:  Vital Signs Last 24 Hrs  T(C): 37.4 (03 Mar 2023 16:00), Max: 37.4 (03 Mar 2023 16:00)  T(F): 99.3 (03 Mar 2023 16:00), Max: 99.3 (03 Mar 2023 16:00)  HR: 80 (03 Mar 2023 17:27) (69 - 85)  BP: 117/80 (03 Mar 2023 06:55) (117/80 - 117/80)  BP(mean): --  RR: 25 (03 Mar 2023 17:27) (14 - 30)  SpO2: 96% (03 Mar 2023 17:27) (94% - 100%)    Parameters below as of 03 Mar 2023 17:27  Patient On (Oxygen Delivery Method): nasal cannula, high flow,heater temp 31  O2 Flow (L/min): 50  O2 Concentration (%): 50    REVIEW OF SYSTEMS:  Gen: No fever  EYES/ENT: No visual changes;  No vertigo or throat pain   NECK: No pain   RES:  No shortness of breath or Cough  CARD: No chest pain   GI: No abdominal pain  : No dysuria  NEURO: No weakness  SKIN: No itching, rashes     Physical Exam:  General: intubated multiple lines gtt & tubes   Neurology: Nonfocal  Eyes: bilaeral pupils equal and reactive   ENT/Neck: +ETT midline, Neck supple, trachea midline, No JVD   Respiratory: Rales noted bilaterally   CV: RRR, S1S2, no murmurs        [x] Sternal dressing, [x] Mediastinal CT        [x] Paced rhythm, [x] Temporary pacing- DDD- 100  Abdominal: Soft, NT, ND +BS,   Extremities: 1-2+ pedal edema noted, + peripheral pulses   Skin: No Rashes, Hematoma, Ecchymosis                           Assessment:    Plan:   ***Neuro***  [x] Nonfocal   Post operative neuro assessment   Gabapentin, oxy, meperidine and Dilaudid    ***Cardiovascular***  Invasive hemodynamic monitoring, assess perfusion indices   SR / CVP 9 / MAP 67 / SvO2 ___ / Hct 37.1 / Lactate 4.0  [x] Levophed 0.05 mcgs [x] Vasopressin 0.07 units    Volume: [x] Albumin 6  Reassessment of hemodynamics post resuscitation   Monitor chest tube outputs   Amiodarone for afib prophylaxis   [] Bleeding ___ / [] Nonbleeding ___   [x] ASA   Serial EKG and cardiac enzymes     ***Pulmonary***  [x] HFNC 50L/50%    Mode: CPAP with PS  FiO2: 40  PEEP: 5  PS: 5  MAP: 7  PIP: 10      ***GI***  Diet: NPO  [x] Pepcid    ***Renal***  GFR 68  Continue to monitor I/Os, BUN/Creatinine.   Replete lytes PRN  Fields present [x] positive     ***ID***  Perioperative antibiotics     ***Endocrine***  [x] Stress Hyperglycemia: HbA1c 5.9%              - [x] Insulin gtt               - Need tight glycemic control to prevent wound infection.    Patient requires continuous monitoring with bedside rhythm monitoring, pulse oximetry monitoring, and continuous central venous and arterial pressure monitoring; and intermittent blood gas analysis. Care plan discussed with the ICU care team.   Patient remained critical, at risk for life threatening decompensation.    I have spent 40 minutes providing critical care management to this patient.    By signing my name below, I, Marya Márquez, attest that this documentation has been prepared under the direction and in the presence of Paige Byrd MD   Electronically signed: Dario Bhatt, 03-03-23 @ 17:36    I, Paige Byrd, personally performed the services described in this documentation. all medical record entries made by the crystalibsebastián were at my direction and in my presence. I have reviewed the chart and agree that the record reflects my personal performance and is accurate and complete  Electronically signed: Paige Byrd MD  Patient seen and examined at the bedside.    Remained critically ill on continuous ICU monitoring.    OBJECTIVE:  Vital Signs Last 24 Hrs  T(C): 37.4 (03 Mar 2023 16:00), Max: 37.4 (03 Mar 2023 16:00)  T(F): 99.3 (03 Mar 2023 16:00), Max: 99.3 (03 Mar 2023 16:00)  HR: 80 (03 Mar 2023 17:27) (69 - 85)  BP: 117/80 (03 Mar 2023 06:55) (117/80 - 117/80)  BP(mean): --  RR: 25 (03 Mar 2023 17:27) (14 - 30)  SpO2: 96% (03 Mar 2023 17:27) (94% - 100%)    Parameters below as of 03 Mar 2023 17:27  Patient On (Oxygen Delivery Method): nasal cannula, high flow,heater temp 31  O2 Flow (L/min): 50  O2 Concentration (%): 50    REVIEW OF SYSTEMS:  + Incisional pain      Physical Exam:  General: intubated multiple lines gtt & tubes   Neurology: Nonfocal  Eyes: bilateral pupils equal and reactive   ENT/Neck: Neck supple, trachea midline, No JVD   Respiratory: Rales noted bilaterally   CV:         [x] Sternal dressing, [x] Mediastinal CT        [x] Paced rhythm, [x] Temporary pacing- DDD- 100  Abdominal: Soft, NT, ND +BS,   Extremities: 1-2+ pedal edema noted, + peripheral pulses   Skin: No Rashes, Hematoma, Ecchymosis                           Assessment:  58 yr old male Coarctation of Aorta S/P Stent repair at age 8 / HTN / HLD / Bicuspid aortic valve w severe aortic stenosis / gastroesophageal reflux disease  S/P AVR (T) D#0  Post op hypotension related to bradycardia, hypovolemia & vasoplegia  Lactic acidosis   Hyperglycemia / hypokalemia / Hypophosphatemia  Post extubation hypoxia     Plan:   ***Neuro***  [x] Nonfocal   Post operative neuro assessment   Gabapentin, oxy, and Dilaudid    ***Cardiovascular***  Post op labile hemodynamics requiring large volume resuscitation titration of pressor & Atrial pacing   Started on low dose inotrope given rising Lactate & pressor requirement   Invasive hemodynamic monitoring, assess serial perfusion indices   Atrial pacing  / CVP 9 / MAP 67 / SvO2 76 / Hct 37.1 / Lactate 5.1   [x] Levophed 0.05 mcg/kg/min  [x] Vasopressin 0.07 units /min [x] Dobutamine 1.25mcg/kg/min   Volume: [x] Albumin 2000 cc [x] 1000 cc   Reassess underlying HR avoid underlying rhythm pacing threshold   Monitor chest tube outputs   [x] Amiodarone (Dc)  [x] ASA [x] Lipitor   Serial EKG and cardiac enzymes     ***Pulmonary***  [x] HFNC 50L/50%  Post op desaturation requiring H Jared O2  F/u Spo2 / ABG /   Lung expansion maneuvers / analgesia   Diuresis as tolerated       ***GI***  Diet: NPO  H/O GERD  [x] Pepcid Dc will switch to PPI    ***Renal***  GFR 68  Continue to monitor I/Os, BUN/Creatinine.   Replete lytes PRN  Fields present [x] positive     ***ID***  Perioperative antibiotics     ***Endocrine***  [x] Stress Hyperglycemia: HbA1c 5.9%              - [x] Insulin gtt               - Need tight glycemic control to prevent wound infection.    Patient requires continuous monitoring with bedside rhythm monitoring, pulse oximetry monitoring, and continuous central venous and arterial pressure monitoring; and intermittent blood gas analysis. Care plan discussed with the ICU care team.   Patient remained critical, at risk for life threatening decompensation.    I have spent 40 minutes providing critical care management to this patient.    By signing my name below, I, Marya Márquez, attest that this documentation has been prepared under the direction and in the presence of Paige Byrd MD   Electronically signed: Dario Bhatt, 03-03-23 @ 17:36    I, Paige Byrd, personally performed the services described in this documentation. all medical record entries made by the scribe were at my direction and in my presence. I have reviewed the chart and agree that the record reflects my personal performance and is accurate and complete  Electronically signed: Paige Byrd MD

## 2023-03-03 NOTE — BRIEF OPERATIVE NOTE - COMMENTS
*EBL not applicable due to use of cell saver while on CPB  I first assisted for the entirety of the case, including sternotomy, cardiopulmonary bypass, replacement of aortic valve and closing.  No qualified resident/fellow available to assist this case

## 2023-03-03 NOTE — PRE-ANESTHESIA EVALUATION ADULT - NSANTHAPLANRD_GEN_ALL_CORE
----- Message from Kierra Chin sent at 6/16/2020  1:56 PM CDT -----  Regarding: RESULTS  Contact: Patient  Type:  Test Results    Who Called: Patient     Name of Test (Lab/Mammo/Etc): Ultrasound     Date of Test: 6/16/2020    Ordering Provider: Dr. Del Toro     Where the test was performed: [facility]    Best Call Back Number: [phone]             For Clinical Team:Has the provider reviewed the results?      
Patient called about results from US he had today. Dr. Vargas ordered them. Patient will call Dr. Ruiz office for results.  
general

## 2023-03-03 NOTE — AIRWAY REMOVAL NOTE  ADULT & PEDS - ARTIFICAL AIRWAY REMOVAL COMMENTS
Written order for extubation verified. The patient was identified by full name and birth date compared to the identification band.  Present during the procedure was Miguel Bailey RN

## 2023-03-04 LAB
ALBUMIN SERPL ELPH-MCNC: 4.6 G/DL — SIGNIFICANT CHANGE UP (ref 3.3–5)
ALP SERPL-CCNC: 68 U/L — SIGNIFICANT CHANGE UP (ref 40–120)
ALT FLD-CCNC: 11 U/L — SIGNIFICANT CHANGE UP (ref 10–45)
ANION GAP SERPL CALC-SCNC: 15 MMOL/L — SIGNIFICANT CHANGE UP (ref 5–17)
APTT BLD: 28.7 SEC — SIGNIFICANT CHANGE UP (ref 27.5–35.5)
AST SERPL-CCNC: 29 U/L — SIGNIFICANT CHANGE UP (ref 10–40)
BASE EXCESS BLDV CALC-SCNC: -0.8 MMOL/L — SIGNIFICANT CHANGE UP (ref -2–3)
BASE EXCESS BLDV CALC-SCNC: 0.8 MMOL/L — SIGNIFICANT CHANGE UP (ref -2–3)
BASE EXCESS BLDV CALC-SCNC: 2.1 MMOL/L — SIGNIFICANT CHANGE UP (ref -2–3)
BASOPHILS # BLD AUTO: 0.01 K/UL — SIGNIFICANT CHANGE UP (ref 0–0.2)
BASOPHILS NFR BLD AUTO: 0.1 % — SIGNIFICANT CHANGE UP (ref 0–2)
BILIRUB SERPL-MCNC: 1.7 MG/DL — HIGH (ref 0.2–1.2)
BLD GP AB SCN SERPL QL: NEGATIVE — SIGNIFICANT CHANGE UP
BUN SERPL-MCNC: 19 MG/DL — SIGNIFICANT CHANGE UP (ref 7–23)
CA-I SERPL-SCNC: 1.23 MMOL/L — SIGNIFICANT CHANGE UP (ref 1.15–1.33)
CA-I SERPL-SCNC: 1.24 MMOL/L — SIGNIFICANT CHANGE UP (ref 1.15–1.33)
CALCIUM SERPL-MCNC: 9.3 MG/DL — SIGNIFICANT CHANGE UP (ref 8.4–10.5)
CHLORIDE BLDV-SCNC: 103 MMOL/L — SIGNIFICANT CHANGE UP (ref 96–108)
CHLORIDE BLDV-SCNC: 104 MMOL/L — SIGNIFICANT CHANGE UP (ref 96–108)
CHLORIDE SERPL-SCNC: 104 MMOL/L — SIGNIFICANT CHANGE UP (ref 96–108)
CO2 BLDV-SCNC: 26 MMOL/L — SIGNIFICANT CHANGE UP (ref 22–26)
CO2 BLDV-SCNC: 27 MMOL/L — HIGH (ref 22–26)
CO2 BLDV-SCNC: 29 MMOL/L — HIGH (ref 22–26)
CO2 SERPL-SCNC: 22 MMOL/L — SIGNIFICANT CHANGE UP (ref 22–31)
CREAT SERPL-MCNC: 1.23 MG/DL — SIGNIFICANT CHANGE UP (ref 0.5–1.3)
EGFR: 68 ML/MIN/1.73M2 — SIGNIFICANT CHANGE UP
EOSINOPHIL # BLD AUTO: 0 K/UL — SIGNIFICANT CHANGE UP (ref 0–0.5)
EOSINOPHIL NFR BLD AUTO: 0 % — SIGNIFICANT CHANGE UP (ref 0–6)
FIBRINOGEN PPP-MCNC: 235 MG/DL — SIGNIFICANT CHANGE UP (ref 200–445)
GAS PNL BLDA: SIGNIFICANT CHANGE UP
GAS PNL BLDV: 138 MMOL/L — SIGNIFICANT CHANGE UP (ref 136–145)
GAS PNL BLDV: 139 MMOL/L — SIGNIFICANT CHANGE UP (ref 136–145)
GAS PNL BLDV: SIGNIFICANT CHANGE UP
GLUCOSE BLDC GLUCOMTR-MCNC: 114 MG/DL — HIGH (ref 70–99)
GLUCOSE BLDC GLUCOMTR-MCNC: 116 MG/DL — HIGH (ref 70–99)
GLUCOSE BLDC GLUCOMTR-MCNC: 120 MG/DL — HIGH (ref 70–99)
GLUCOSE BLDC GLUCOMTR-MCNC: 125 MG/DL — HIGH (ref 70–99)
GLUCOSE BLDC GLUCOMTR-MCNC: 126 MG/DL — HIGH (ref 70–99)
GLUCOSE BLDC GLUCOMTR-MCNC: 128 MG/DL — HIGH (ref 70–99)
GLUCOSE BLDC GLUCOMTR-MCNC: 132 MG/DL — HIGH (ref 70–99)
GLUCOSE BLDC GLUCOMTR-MCNC: 134 MG/DL — HIGH (ref 70–99)
GLUCOSE BLDC GLUCOMTR-MCNC: 134 MG/DL — HIGH (ref 70–99)
GLUCOSE BLDC GLUCOMTR-MCNC: 136 MG/DL — HIGH (ref 70–99)
GLUCOSE BLDC GLUCOMTR-MCNC: 136 MG/DL — HIGH (ref 70–99)
GLUCOSE BLDC GLUCOMTR-MCNC: 137 MG/DL — HIGH (ref 70–99)
GLUCOSE BLDC GLUCOMTR-MCNC: 141 MG/DL — HIGH (ref 70–99)
GLUCOSE BLDC GLUCOMTR-MCNC: 143 MG/DL — HIGH (ref 70–99)
GLUCOSE BLDC GLUCOMTR-MCNC: 144 MG/DL — HIGH (ref 70–99)
GLUCOSE BLDC GLUCOMTR-MCNC: 145 MG/DL — HIGH (ref 70–99)
GLUCOSE BLDC GLUCOMTR-MCNC: 146 MG/DL — HIGH (ref 70–99)
GLUCOSE BLDC GLUCOMTR-MCNC: 149 MG/DL — HIGH (ref 70–99)
GLUCOSE BLDC GLUCOMTR-MCNC: 156 MG/DL — HIGH (ref 70–99)
GLUCOSE BLDC GLUCOMTR-MCNC: 172 MG/DL — HIGH (ref 70–99)
GLUCOSE BLDC GLUCOMTR-MCNC: 175 MG/DL — HIGH (ref 70–99)
GLUCOSE BLDV-MCNC: 120 MG/DL — HIGH (ref 70–99)
GLUCOSE BLDV-MCNC: 139 MG/DL — HIGH (ref 70–99)
GLUCOSE SERPL-MCNC: 123 MG/DL — HIGH (ref 70–99)
HCO3 BLDV-SCNC: 25 MMOL/L — SIGNIFICANT CHANGE UP (ref 22–29)
HCO3 BLDV-SCNC: 26 MMOL/L — SIGNIFICANT CHANGE UP (ref 22–29)
HCO3 BLDV-SCNC: 27 MMOL/L — SIGNIFICANT CHANGE UP (ref 22–29)
HCT VFR BLD CALC: 29.6 % — LOW (ref 39–50)
HCT VFR BLDA CALC: 32 % — LOW (ref 39–51)
HCT VFR BLDA CALC: 32 % — LOW (ref 39–51)
HGB BLD CALC-MCNC: 10.5 G/DL — LOW (ref 12.6–17.4)
HGB BLD CALC-MCNC: 10.8 G/DL — LOW (ref 12.6–17.4)
HGB BLD-MCNC: 10 G/DL — LOW (ref 13–17)
HOROWITZ INDEX BLDV+IHG-RTO: 50 — SIGNIFICANT CHANGE UP
HOROWITZ INDEX BLDV+IHG-RTO: 50 — SIGNIFICANT CHANGE UP
HOROWITZ INDEX BLDV+IHG-RTO: 60 — SIGNIFICANT CHANGE UP
IMM GRANULOCYTES NFR BLD AUTO: 0.6 % — SIGNIFICANT CHANGE UP (ref 0–0.9)
INR BLD: 1.32 RATIO — HIGH (ref 0.88–1.16)
LACTATE BLDV-MCNC: 1.4 MMOL/L — SIGNIFICANT CHANGE UP (ref 0.5–2)
LACTATE BLDV-MCNC: 2.1 MMOL/L — HIGH (ref 0.5–2)
LYMPHOCYTES # BLD AUTO: 0.57 K/UL — LOW (ref 1–3.3)
LYMPHOCYTES # BLD AUTO: 4.6 % — LOW (ref 13–44)
MAGNESIUM SERPL-MCNC: 2.1 MG/DL — SIGNIFICANT CHANGE UP (ref 1.6–2.6)
MCHC RBC-ENTMCNC: 27.4 PG — SIGNIFICANT CHANGE UP (ref 27–34)
MCHC RBC-ENTMCNC: 33.8 GM/DL — SIGNIFICANT CHANGE UP (ref 32–36)
MCV RBC AUTO: 81.1 FL — SIGNIFICANT CHANGE UP (ref 80–100)
MONOCYTES # BLD AUTO: 0.42 K/UL — SIGNIFICANT CHANGE UP (ref 0–0.9)
MONOCYTES NFR BLD AUTO: 3.4 % — SIGNIFICANT CHANGE UP (ref 2–14)
NEUTROPHILS # BLD AUTO: 11.28 K/UL — HIGH (ref 1.8–7.4)
NEUTROPHILS NFR BLD AUTO: 91.3 % — HIGH (ref 43–77)
NRBC # BLD: 0 /100 WBCS — SIGNIFICANT CHANGE UP (ref 0–0)
PCO2 BLDV: 43 MMHG — SIGNIFICANT CHANGE UP (ref 42–55)
PCO2 BLDV: 44 MMHG — SIGNIFICANT CHANGE UP (ref 42–55)
PCO2 BLDV: 44 MMHG — SIGNIFICANT CHANGE UP (ref 42–55)
PH BLDV: 7.36 — SIGNIFICANT CHANGE UP (ref 7.32–7.43)
PH BLDV: 7.39 — SIGNIFICANT CHANGE UP (ref 7.32–7.43)
PH BLDV: 7.4 — SIGNIFICANT CHANGE UP (ref 7.32–7.43)
PHOSPHATE SERPL-MCNC: 2.6 MG/DL — SIGNIFICANT CHANGE UP (ref 2.5–4.5)
PLATELET # BLD AUTO: 114 K/UL — LOW (ref 150–400)
PO2 BLDV: 39 MMHG — SIGNIFICANT CHANGE UP (ref 25–45)
PO2 BLDV: 41 MMHG — SIGNIFICANT CHANGE UP (ref 25–45)
PO2 BLDV: 42 MMHG — SIGNIFICANT CHANGE UP (ref 25–45)
POTASSIUM BLDA-SCNC: 3.9 MMOL/L — SIGNIFICANT CHANGE UP (ref 3.5–5.1)
POTASSIUM BLDV-SCNC: 4 MMOL/L — SIGNIFICANT CHANGE UP (ref 3.5–5.1)
POTASSIUM BLDV-SCNC: 4 MMOL/L — SIGNIFICANT CHANGE UP (ref 3.5–5.1)
POTASSIUM SERPL-MCNC: 4.3 MMOL/L — SIGNIFICANT CHANGE UP (ref 3.5–5.3)
POTASSIUM SERPL-SCNC: 4.3 MMOL/L — SIGNIFICANT CHANGE UP (ref 3.5–5.3)
PROT SERPL-MCNC: 6 G/DL — SIGNIFICANT CHANGE UP (ref 6–8.3)
PROTHROM AB SERPL-ACNC: 15.3 SEC — HIGH (ref 10.5–13.4)
RBC # BLD: 3.65 M/UL — LOW (ref 4.2–5.8)
RBC # FLD: 12.7 % — SIGNIFICANT CHANGE UP (ref 10.3–14.5)
RH IG SCN BLD-IMP: POSITIVE — SIGNIFICANT CHANGE UP
SAO2 % BLDV: 64.8 % — LOW (ref 67–88)
SAO2 % BLDV: 66 % — LOW (ref 67–88)
SAO2 % BLDV: 74.1 % — SIGNIFICANT CHANGE UP (ref 67–88)
SODIUM SERPL-SCNC: 141 MMOL/L — SIGNIFICANT CHANGE UP (ref 135–145)
TSH SERPL-MCNC: 0.87 UIU/ML — SIGNIFICANT CHANGE UP (ref 0.27–4.2)
WBC # BLD: 12.36 K/UL — HIGH (ref 3.8–10.5)
WBC # FLD AUTO: 12.36 K/UL — HIGH (ref 3.8–10.5)

## 2023-03-04 PROCEDURE — 99291 CRITICAL CARE FIRST HOUR: CPT

## 2023-03-04 PROCEDURE — 93010 ELECTROCARDIOGRAM REPORT: CPT

## 2023-03-04 PROCEDURE — 71045 X-RAY EXAM CHEST 1 VIEW: CPT | Mod: 26

## 2023-03-04 RX ORDER — METOCLOPRAMIDE HCL 10 MG
5 TABLET ORAL EVERY 8 HOURS
Refills: 0 | Status: COMPLETED | OUTPATIENT
Start: 2023-03-04 | End: 2023-03-05

## 2023-03-04 RX ORDER — DEXAMETHASONE 0.5 MG/5ML
4 ELIXIR ORAL EVERY 8 HOURS
Refills: 0 | Status: DISCONTINUED | OUTPATIENT
Start: 2023-03-04 | End: 2023-03-04

## 2023-03-04 RX ORDER — FUROSEMIDE 40 MG
20 TABLET ORAL ONCE
Refills: 0 | Status: COMPLETED | OUTPATIENT
Start: 2023-03-04 | End: 2023-03-04

## 2023-03-04 RX ORDER — FAMOTIDINE 10 MG/ML
20 INJECTION INTRAVENOUS DAILY
Refills: 0 | Status: DISCONTINUED | OUTPATIENT
Start: 2023-03-04 | End: 2023-03-05

## 2023-03-04 RX ORDER — HEPARIN SODIUM 5000 [USP'U]/ML
5000 INJECTION INTRAVENOUS; SUBCUTANEOUS EVERY 8 HOURS
Refills: 0 | Status: DISCONTINUED | OUTPATIENT
Start: 2023-03-04 | End: 2023-03-09

## 2023-03-04 RX ADMIN — HYDROMORPHONE HYDROCHLORIDE 0.5 MILLIGRAM(S): 2 INJECTION INTRAMUSCULAR; INTRAVENOUS; SUBCUTANEOUS at 17:32

## 2023-03-04 RX ADMIN — HEPARIN SODIUM 5000 UNIT(S): 5000 INJECTION INTRAVENOUS; SUBCUTANEOUS at 21:37

## 2023-03-04 RX ADMIN — Medication 100 MILLIGRAM(S): at 21:32

## 2023-03-04 RX ADMIN — SENNA PLUS 2 TABLET(S): 8.6 TABLET ORAL at 21:35

## 2023-03-04 RX ADMIN — Medication 5 MILLIGRAM(S): at 21:36

## 2023-03-04 RX ADMIN — GABAPENTIN 100 MILLIGRAM(S): 400 CAPSULE ORAL at 06:15

## 2023-03-04 RX ADMIN — GABAPENTIN 100 MILLIGRAM(S): 400 CAPSULE ORAL at 15:00

## 2023-03-04 RX ADMIN — FAMOTIDINE 20 MILLIGRAM(S): 10 INJECTION INTRAVENOUS at 06:10

## 2023-03-04 RX ADMIN — Medication 5 MILLIGRAM(S): at 00:07

## 2023-03-04 RX ADMIN — Medication 650 MILLIGRAM(S): at 19:01

## 2023-03-04 RX ADMIN — Medication 5 MILLIGRAM(S): at 13:02

## 2023-03-04 RX ADMIN — Medication 650 MILLIGRAM(S): at 12:44

## 2023-03-04 RX ADMIN — Medication 650 MILLIGRAM(S): at 07:06

## 2023-03-04 RX ADMIN — ATORVASTATIN CALCIUM 80 MILLIGRAM(S): 80 TABLET, FILM COATED ORAL at 21:35

## 2023-03-04 RX ADMIN — Medication 100 MILLIGRAM(S): at 12:49

## 2023-03-04 RX ADMIN — HYDROMORPHONE HYDROCHLORIDE 0.5 MILLIGRAM(S): 2 INJECTION INTRAMUSCULAR; INTRAVENOUS; SUBCUTANEOUS at 05:30

## 2023-03-04 RX ADMIN — INSULIN HUMAN 3 UNIT(S)/HR: 100 INJECTION, SOLUTION SUBCUTANEOUS at 08:28

## 2023-03-04 RX ADMIN — HYDROMORPHONE HYDROCHLORIDE 0.5 MILLIGRAM(S): 2 INJECTION INTRAMUSCULAR; INTRAVENOUS; SUBCUTANEOUS at 05:00

## 2023-03-04 RX ADMIN — HYDROMORPHONE HYDROCHLORIDE 0.5 MILLIGRAM(S): 2 INJECTION INTRAMUSCULAR; INTRAVENOUS; SUBCUTANEOUS at 17:47

## 2023-03-04 RX ADMIN — GABAPENTIN 100 MILLIGRAM(S): 400 CAPSULE ORAL at 21:36

## 2023-03-04 RX ADMIN — Medication 81 MILLIGRAM(S): at 12:44

## 2023-03-04 RX ADMIN — Medication 650 MILLIGRAM(S): at 00:25

## 2023-03-04 RX ADMIN — Medication 650 MILLIGRAM(S): at 17:33

## 2023-03-04 RX ADMIN — Medication 500 MILLIGRAM(S): at 17:34

## 2023-03-04 RX ADMIN — Medication 3.49 MICROGRAM(S)/KG/MIN: at 08:28

## 2023-03-04 RX ADMIN — Medication 650 MILLIGRAM(S): at 23:16

## 2023-03-04 RX ADMIN — Medication 5 MILLIGRAM(S): at 06:11

## 2023-03-04 RX ADMIN — Medication 650 MILLIGRAM(S): at 23:38

## 2023-03-04 RX ADMIN — Medication 20 MILLIGRAM(S): at 19:48

## 2023-03-04 RX ADMIN — Medication 500 MILLIGRAM(S): at 06:14

## 2023-03-04 RX ADMIN — CHLORHEXIDINE GLUCONATE 1 APPLICATION(S): 213 SOLUTION TOPICAL at 07:08

## 2023-03-04 RX ADMIN — FAMOTIDINE 20 MILLIGRAM(S): 10 INJECTION INTRAVENOUS at 12:48

## 2023-03-04 RX ADMIN — POLYETHYLENE GLYCOL 3350 17 GRAM(S): 17 POWDER, FOR SOLUTION ORAL at 12:44

## 2023-03-04 RX ADMIN — Medication 100 MILLIGRAM(S): at 04:54

## 2023-03-04 RX ADMIN — Medication 650 MILLIGRAM(S): at 06:06

## 2023-03-04 RX ADMIN — Medication 650 MILLIGRAM(S): at 13:44

## 2023-03-04 RX ADMIN — Medication 20 MILLIGRAM(S): at 13:01

## 2023-03-04 NOTE — PROGRESS NOTE ADULT - SUBJECTIVE AND OBJECTIVE BOX
Patient seen and examined at the bedside.    Remained critically ill on continuous ICU monitoring.      Brief Summary:  58 yo M with pre op Calcific aortic stenosis of bicuspid valve s/p AVR on 3/3/2023      24 Hour events:      Objective:  Vital Signs Last 24 Hrs  T(C): 37.2 (04 Mar 2023 04:00), Max: 37.4 (03 Mar 2023 16:00)  T(F): 99 (04 Mar 2023 04:00), Max: 99.3 (03 Mar 2023 16:00)  HR: 73 (04 Mar 2023 06:00) (62 - 98)  BP: 117/80 (03 Mar 2023 06:55) (117/80 - 117/80)  BP(mean): --  RR: 29 (04 Mar 2023 06:00) (14 - 41)  SpO2: 93% (04 Mar 2023 06:00) (91% - 100%)    Parameters below as of 04 Mar 2023 04:00  Patient On (Oxygen Delivery Method): nasal cannula, high flow  O2 Flow (L/min): 50  O2 Concentration (%): 60    Mode: CPAP with PS  FiO2: 40  PEEP: 5  PS: 5  MAP: 7  PIP: 10              Physical Exam:   General: NAD   Neurology: nonfocal   Eyes: bilateral pupils equal and reactive   ENT/Neck: Neck supple, trachea midline, No JVD   Respiratory: Clear bilaterally   CV: S1S2, no murmurs        [x] Sternal dressing, [x] Mediastinal CT x2        [x] Sinus rhythm, [x] Temporary pacing- AAI 80  Abdominal: Soft, NT, ND +BS   Extremities: 1-2+ pedal edema noted, + peripheral pulses   Skin: No Rashes, Hematoma, Ecchymosis         -------------------------------------------------------------------------------------------------------------------------------  Labs:                          10.0   12.36 )-----------( 114      ( 04 Mar 2023 00:33 )             29.6     03-04    141  |  104  |  19  ----------------------------<  123<H>  4.3   |  22  |  1.23    Ca    9.3      04 Mar 2023 00:33  Phos  2.6     03-04  Mg     2.1     03-04    TPro  6.0  /  Alb  4.6  /  TBili  1.7<H>  /  DBili  x   /  AST  29  /  ALT  11  /  AlkPhos  68  03-04    LIVER FUNCTIONS - ( 04 Mar 2023 00:33 )  Alb: 4.6 g/dL / Pro: 6.0 g/dL / ALK PHOS: 68 U/L / ALT: 11 U/L / AST: 29 U/L / GGT: x           PT/INR - ( 04 Mar 2023 00:33 )   PT: 15.3 sec;   INR: 1.32 ratio         PTT - ( 04 Mar 2023 00:33 )  PTT:28.7 sec  ABG - ( 04 Mar 2023 04:15 )  pH, Arterial: 7.40  pH, Blood: x     /  pCO2: 42    /  pO2: 94    / HCO3: 26    / Base Excess: 1.0   /  SaO2: 98.0        ------------------------------------------------------------------------------------------------------------------------------  Assessment:  57 year old male with h/o repair coarctation of aorta at age 8, states aortic narrowing, presents for preop testing for scheduled AVR on 3/3/2023. His medical history significant for HTN, GERD, hyperlipidemia, bicuspid aortic valve, deviated nasal septum, lower back pain. He denies any chest pain, no SOB    Calcific aortic stenosis of bicuspid valve s/p AVR on 3/3/23  At high risk for hemodynamic instability  At high risk for cardiac arrhythmias  Hypovolemia  Postoperative respiratory failure  Acute blood loss anemia  Thrombocytopenia  Stress hyperglycemia  Luekocytosis      Plan:   ***Neuro***  Postoperative acute pain control with Tylenol, Gabapentin, Oxycodone, and PRN Dilaudid    ***Cardiovascular***  Invasive hemodynamic monitoring, assess perfusion indices   At high risk for hemodynamic instability and cardiac arrhythmias.  IVF for perioperative hypovolemia.  IV Dobutamine infusion for inotropic support   ASA/Statin Daily  Monitor chest tube output.      ***Pulmonary***  Postoperative acute respiratory insufficiency - HFNC 60%/50L  Deep breathing and coughing exercises.  Wean oxygen as able.      ***GI***  NPO for now.  Pepcid for stress ulcer ppx  Bowel regimen with Miralax and senna    ***Renal***  Fields catheter for strict I/O measurements.   Replete electrolytes as indicated.      ***ID***  Perioperative coverage with Cefuroxime   WBC 33.35 -> 12.36, continue to trend leukocytosis    ***Endocrine***  Stress Hyperglycemia  Insulin as needed to maintain euglycemia.     ***Hematology***  Acute blood loss anemia and thrombocytopenia - no current transfusion indication         Patient requires continuous monitoring with bedside rhythm monitoring, pulse oximetry monitoring, and continuous central venous and arterial pressure monitoring; and intermittent blood gas analysis. Care plan discussed with the ICU care team.   Patient remained critical, at risk for life threatening decompensation.    I have spent 30 minutes providing critical care management to this patient.    By signing my name below, I, Maria D'Amico, attest that this documentation has been prepared under the direction and in the presence of Kate Terrazas MD  Electronically signed: Maria D'Amico, Scribe, 03-04-23 @ 06:19    I, Kate Terrazas MD, personally performed the services described in this documentation. all medical record entries made by the crystalibsebastián were at my direction and in my presence. I have reviewed the chart and agree that the record reflects my personal performance and is accurate and complete  Electronically signed: Kate Terrazas MD Patient seen and examined at the bedside.    Remained critically ill on continuous ICU monitoring.      Brief Summary:  56 yo M with pre op Calcific aortic stenosis of bicuspid valve s/p AVR on 3/3/2023      24 Hour events:  Levophed weaned off  Wean Dobutamine off  Plan to start DVT prophylaxis with SQ Heparin later today    Objective:  Vital Signs Last 24 Hrs  T(C): 37.2 (04 Mar 2023 04:00), Max: 37.4 (03 Mar 2023 16:00)  T(F): 99 (04 Mar 2023 04:00), Max: 99.3 (03 Mar 2023 16:00)  HR: 73 (04 Mar 2023 06:00) (62 - 98)  BP: 117/80 (03 Mar 2023 06:55) (117/80 - 117/80)  BP(mean): --  RR: 29 (04 Mar 2023 06:00) (14 - 41)  SpO2: 93% (04 Mar 2023 06:00) (91% - 100%)    Parameters below as of 04 Mar 2023 04:00  Patient On (Oxygen Delivery Method): nasal cannula, high flow  O2 Flow (L/min): 50  O2 Concentration (%): 60    Mode: CPAP with PS  FiO2: 40  PEEP: 5  PS: 5  MAP: 7  PIP: 10              Physical Exam:   General: NAD   Neurology: nonfocal   Eyes: bilateral pupils equal and reactive   ENT/Neck: Neck supple, trachea midline, No JVD   Respiratory: Clear bilaterally   CV: S1S2, no murmurs        [x] Sternal dressing, [x] Mediastinal CT x2        [x] Sinus rhythm, [x] Temporary pacing- AAI 80  Abdominal: Soft, NT, ND +BS   Extremities: 1-2+ pedal edema noted, + peripheral pulses   Skin: No Rashes, Hematoma, Ecchymosis         -------------------------------------------------------------------------------------------------------------------------------  Labs:                          10.0   12.36 )-----------( 114      ( 04 Mar 2023 00:33 )             29.6     03-04    141  |  104  |  19  ----------------------------<  123<H>  4.3   |  22  |  1.23    Ca    9.3      04 Mar 2023 00:33  Phos  2.6     03-04  Mg     2.1     03-04    TPro  6.0  /  Alb  4.6  /  TBili  1.7<H>  /  DBili  x   /  AST  29  /  ALT  11  /  AlkPhos  68  03-04    LIVER FUNCTIONS - ( 04 Mar 2023 00:33 )  Alb: 4.6 g/dL / Pro: 6.0 g/dL / ALK PHOS: 68 U/L / ALT: 11 U/L / AST: 29 U/L / GGT: x           PT/INR - ( 04 Mar 2023 00:33 )   PT: 15.3 sec;   INR: 1.32 ratio         PTT - ( 04 Mar 2023 00:33 )  PTT:28.7 sec  ABG - ( 04 Mar 2023 04:15 )  pH, Arterial: 7.40  pH, Blood: x     /  pCO2: 42    /  pO2: 94    / HCO3: 26    / Base Excess: 1.0   /  SaO2: 98.0        ------------------------------------------------------------------------------------------------------------------------------  Assessment:  57 year old male with h/o repair coarctation of aorta at age 8, states aortic narrowing, presents for preop testing for scheduled AVR on 3/3/2023. His medical history significant for HTN, GERD, hyperlipidemia, bicuspid aortic valve, deviated nasal septum, lower back pain. He denies any chest pain, no SOB    Calcific aortic stenosis of bicuspid valve s/p AVR on 3/3/23  At high risk for hemodynamic instability  At high risk for cardiac arrhythmias  Hypovolemia  Postoperative respiratory failure  Acute blood loss anemia  Thrombocytopenia  Stress hyperglycemia  Leukocytosis      Plan:   ***Neuro***  Postoperative acute pain control with Tylenol, Gabapentin, Oxycodone, and PRN Dilaudid  PT/ Ambulate as tolerated    ***Cardiovascular***  Invasive hemodynamic monitoring, assess perfusion indices   At high risk for hemodynamic instability and cardiac arrhythmias.  IVF for perioperative hypovolemia.  IV Dobutamine infusion for inotropic support   ASA/Statin Daily  Monitor chest tube output.      ***Pulmonary***  Postoperative acute respiratory insufficiency - HFNC 50%/50L  Deep breathing and coughing exercises.  Wean oxygen as able.      ***GI***  Tolerating consistent carb diet   Pepcid for stress ulcer ppx  Bowel regimen with Miralax and senna  Reglan for gut motility    ***Renal***  Fields catheter for strict I/O measurements.   Replete electrolytes as indicated.  Diuresis with Lasix as needed    ***ID***  Perioperative coverage with Cefuroxime   WBC 33.35 -> 12.36, continue to trend leukocytosis    ***Endocrine***  Stress Hyperglycemia  Insulin as needed to maintain euglycemia.     ***Hematology***  Acute blood loss anemia and thrombocytopenia - no current transfusion indication   Plan to start DVT prophylaxis with SQ Heparin later today      Patient requires continuous monitoring with bedside rhythm monitoring, pulse oximetry monitoring, and continuous central venous and arterial pressure monitoring; and intermittent blood gas analysis. Care plan discussed with the ICU care team.   Patient remained critical, at risk for life threatening decompensation.    I have spent 30 minutes providing critical care management to this patient.    By signing my name below, I, Maria D'Amico, attest that this documentation has been prepared under the direction and in the presence of Kate Terrazas MD  Electronically signed: Maria D'Amico, Scribe, 03-04-23 @ 06:19    JOSE, Kate Terrazas MD, personally performed the services described in this documentation. all medical record entries made by the crystalibsebastián were at my direction and in my presence. I have reviewed the chart and agree that the record reflects my personal performance and is accurate and complete  Electronically signed: Kate Terrazas MD Patient seen and examined at the bedside.    Remains critically ill on continuous ICU monitoring.      Brief Summary:  56 yo M s/p AVR on 3/3/2023      24 Hour events:  Norepinephrine weaned to off.  Weaning Dobutamine.      Objective:  Vital Signs Last 24 Hrs  T(C): 37.2 (04 Mar 2023 04:00), Max: 37.4 (03 Mar 2023 16:00)  T(F): 99 (04 Mar 2023 04:00), Max: 99.3 (03 Mar 2023 16:00)  HR: 73 (04 Mar 2023 06:00) (62 - 98)  BP: 117/80 (03 Mar 2023 06:55) (117/80 - 117/80)  BP(mean): --  RR: 29 (04 Mar 2023 06:00) (14 - 41)  SpO2: 93% (04 Mar 2023 06:00) (91% - 100%)    Parameters below as of 04 Mar 2023 04:00  Patient On (Oxygen Delivery Method): nasal cannula, high flow  O2 Flow (L/min): 50  O2 Concentration (%): 60              Physical Exam:   General: Sitting up, tired, but answering questions   Neurology: Oriented, following commands   Eyes: Bilateral pupils reactive   Respiratory: Clear bilaterally   CV: Sinus rhythm  Abdominal: Soft, Nontender  Extremities: Warm         -------------------------------------------------------------------------------------------------------------------------------  Labs:                          10.0   12.36 )-----------( 114      ( 04 Mar 2023 00:33 )             29.6     03-04    141  |  104  |  19  ----------------------------<  123<H>  4.3   |  22  |  1.23    Ca    9.3      04 Mar 2023 00:33  Phos  2.6     03-04  Mg     2.1     03-04    TPro  6.0  /  Alb  4.6  /  TBili  1.7<H>  /  DBili  x   /  AST  29  /  ALT  11  /  AlkPhos  68  03-04    LIVER FUNCTIONS - ( 04 Mar 2023 00:33 )  Alb: 4.6 g/dL / Pro: 6.0 g/dL / ALK PHOS: 68 U/L / ALT: 11 U/L / AST: 29 U/L / GGT: x           PT/INR - ( 04 Mar 2023 00:33 )   PT: 15.3 sec;   INR: 1.32 ratio         PTT - ( 04 Mar 2023 00:33 )  PTT:28.7 sec  ABG - ( 04 Mar 2023 04:15 )  pH, Arterial: 7.40  pH, Blood: x     /  pCO2: 42    /  pO2: 94    / HCO3: 26    / Base Excess: 1.0   /  SaO2: 98.0        ------------------------------------------------------------------------------------------------------------------------------  Assessment:  56 yo M s/p AVR on 3/3/23    At high risk for hemodynamic instability  At high risk for cardiac arrhythmias  Postoperative acute respiratory insufficiency  Stress hyperglycemia      Plan:   ***Neuro***  Postoperative acute pain control with Tylenol, Gabapentin, Oxycodone, and PRN Dilaudid  PT/ Ambulate as tolerated    ***Cardiovascular***   At high risk for hemodynamic instability and cardiac arrhythmias.  Off pressors now, wean Dobutamine.  Pacing wires if needed for backup  Amiodarone for A fib prophylaxis.  ASA/Statin Daily  Monitor chest tube output.    ***Pulmonary***  Postoperative acute respiratory insufficiency   Deep breathing and coughing exercises.  Wean oxygen as able.    ***GI***  Tolerating consistent carb diet   Pepcid for stress ulcer ppx  Bowel regimen  Reglan for gut motility    ***Renal***  Trend creatinine  Fields catheter for strict I/O measurements.   Replete electrolytes as indicated.  Likely diuresis later today.    ***ID***  Perioperative coverage with Cefuroxime     ***Endocrine***  Stress Hyperglycemia  Insulin infusion.    ***Hematology***  Acute blood loss anemia and thrombocytopenia - no current transfusion indication   Plan to start DVT prophylaxis with SQ Heparin later today      Patient requires continuous monitoring with bedside rhythm monitoring, pulse oximetry monitoring, and continuous central venous and arterial pressure monitoring; and intermittent blood gas analysis. Care plan discussed with the ICU care team.   Patient remains critical, at risk for life threatening decompensation.    I have spent 35 minutes providing critical care management to this patient.    By signing my name below, I, Maria D'Amico, attest that this documentation has been prepared under the direction and in the presence of Kate Terrazas MD  Electronically signed: Maria D'Amico, Scribe, 03-04-23 @ 06:19    I, Kate Terrazas MD, personally performed the services described in this documentation. all medical record entries made by the scribe were at my direction and in my presence. I have reviewed the chart and agree that the record reflects my personal performance and is accurate and complete  Electronically signed: Kate Terrazas MD

## 2023-03-04 NOTE — PHYSICAL THERAPY INITIAL EVALUATION ADULT - GENERAL OBSERVATIONS, REHAB EVAL
Pt s/p AVR 3/3, sternal precautions reviewed and maintained t/o. Pt received seated in chair in NAD, VSS, +HF NC, +ICU monitoring, +nesbitt, +2 chest tubes, +ext pacer, A&Ox4, agreeable to PT.

## 2023-03-04 NOTE — PHYSICAL THERAPY INITIAL EVALUATION ADULT - PERTINENT HX OF CURRENT PROBLEM, REHAB EVAL
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 His medical history significant for HTN, GERD, hyperlipidemia, bicuspid aortic valve, deviated nasal septum, lower back pain. He denies any chest pain, no SOB. Presents with calcific aortic stenosis of bicuspid valve s/p AVR on 3/3/23

## 2023-03-05 LAB
ALBUMIN SERPL ELPH-MCNC: 4.3 G/DL — SIGNIFICANT CHANGE UP (ref 3.3–5)
ALP SERPL-CCNC: 75 U/L — SIGNIFICANT CHANGE UP (ref 40–120)
ALT FLD-CCNC: 15 U/L — SIGNIFICANT CHANGE UP (ref 10–45)
ANION GAP SERPL CALC-SCNC: 12 MMOL/L — SIGNIFICANT CHANGE UP (ref 5–17)
APPEARANCE UR: CLEAR — SIGNIFICANT CHANGE UP
AST SERPL-CCNC: 29 U/L — SIGNIFICANT CHANGE UP (ref 10–40)
BACTERIA # UR AUTO: NEGATIVE — SIGNIFICANT CHANGE UP
BILIRUB SERPL-MCNC: 0.6 MG/DL — SIGNIFICANT CHANGE UP (ref 0.2–1.2)
BILIRUB UR-MCNC: NEGATIVE — SIGNIFICANT CHANGE UP
BUN SERPL-MCNC: 29 MG/DL — HIGH (ref 7–23)
CALCIUM SERPL-MCNC: 9.6 MG/DL — SIGNIFICANT CHANGE UP (ref 8.4–10.5)
CHLORIDE SERPL-SCNC: 103 MMOL/L — SIGNIFICANT CHANGE UP (ref 96–108)
CO2 SERPL-SCNC: 26 MMOL/L — SIGNIFICANT CHANGE UP (ref 22–31)
COLOR SPEC: SIGNIFICANT CHANGE UP
CREAT SERPL-MCNC: 1.4 MG/DL — HIGH (ref 0.5–1.3)
DIFF PNL FLD: ABNORMAL
EGFR: 59 ML/MIN/1.73M2 — LOW
EPI CELLS # UR: 1 /HPF — SIGNIFICANT CHANGE UP
GAS PNL BLDA: SIGNIFICANT CHANGE UP
GLUCOSE BLDC GLUCOMTR-MCNC: 111 MG/DL — HIGH (ref 70–99)
GLUCOSE BLDC GLUCOMTR-MCNC: 111 MG/DL — HIGH (ref 70–99)
GLUCOSE BLDC GLUCOMTR-MCNC: 113 MG/DL — HIGH (ref 70–99)
GLUCOSE BLDC GLUCOMTR-MCNC: 117 MG/DL — HIGH (ref 70–99)
GLUCOSE BLDC GLUCOMTR-MCNC: 118 MG/DL — HIGH (ref 70–99)
GLUCOSE BLDC GLUCOMTR-MCNC: 119 MG/DL — HIGH (ref 70–99)
GLUCOSE BLDC GLUCOMTR-MCNC: 120 MG/DL — HIGH (ref 70–99)
GLUCOSE BLDC GLUCOMTR-MCNC: 133 MG/DL — HIGH (ref 70–99)
GLUCOSE BLDC GLUCOMTR-MCNC: 140 MG/DL — HIGH (ref 70–99)
GLUCOSE BLDC GLUCOMTR-MCNC: 151 MG/DL — HIGH (ref 70–99)
GLUCOSE BLDC GLUCOMTR-MCNC: 157 MG/DL — HIGH (ref 70–99)
GLUCOSE BLDC GLUCOMTR-MCNC: 173 MG/DL — HIGH (ref 70–99)
GLUCOSE BLDC GLUCOMTR-MCNC: 30 MG/DL — CRITICAL LOW (ref 70–99)
GLUCOSE SERPL-MCNC: 137 MG/DL — HIGH (ref 70–99)
GLUCOSE UR QL: NEGATIVE — SIGNIFICANT CHANGE UP
HCT VFR BLD CALC: 32.1 % — LOW (ref 39–50)
HGB BLD-MCNC: 10.6 G/DL — LOW (ref 13–17)
HYALINE CASTS # UR AUTO: 5 /LPF — HIGH (ref 0–2)
KETONES UR-MCNC: NEGATIVE — SIGNIFICANT CHANGE UP
LEUKOCYTE ESTERASE UR-ACNC: NEGATIVE — SIGNIFICANT CHANGE UP
MAGNESIUM SERPL-MCNC: 2.2 MG/DL — SIGNIFICANT CHANGE UP (ref 1.6–2.6)
MCHC RBC-ENTMCNC: 26.9 PG — LOW (ref 27–34)
MCHC RBC-ENTMCNC: 33 GM/DL — SIGNIFICANT CHANGE UP (ref 32–36)
MCV RBC AUTO: 81.5 FL — SIGNIFICANT CHANGE UP (ref 80–100)
NITRITE UR-MCNC: NEGATIVE — SIGNIFICANT CHANGE UP
NRBC # BLD: 0 /100 WBCS — SIGNIFICANT CHANGE UP (ref 0–0)
PH UR: 6.5 — SIGNIFICANT CHANGE UP (ref 5–8)
PHOSPHATE SERPL-MCNC: 3.3 MG/DL — SIGNIFICANT CHANGE UP (ref 2.5–4.5)
PLATELET # BLD AUTO: 143 K/UL — LOW (ref 150–400)
POTASSIUM SERPL-MCNC: 4.1 MMOL/L — SIGNIFICANT CHANGE UP (ref 3.5–5.3)
POTASSIUM SERPL-SCNC: 4.1 MMOL/L — SIGNIFICANT CHANGE UP (ref 3.5–5.3)
PROT SERPL-MCNC: 6.1 G/DL — SIGNIFICANT CHANGE UP (ref 6–8.3)
PROT UR-MCNC: ABNORMAL
RBC # BLD: 3.94 M/UL — LOW (ref 4.2–5.8)
RBC # FLD: 13.2 % — SIGNIFICANT CHANGE UP (ref 10.3–14.5)
RBC CASTS # UR COMP ASSIST: 11 /HPF — HIGH (ref 0–4)
SODIUM SERPL-SCNC: 141 MMOL/L — SIGNIFICANT CHANGE UP (ref 135–145)
SP GR SPEC: 1.03 — HIGH (ref 1.01–1.02)
UROBILINOGEN FLD QL: NEGATIVE — SIGNIFICANT CHANGE UP
WBC # BLD: 26.46 K/UL — HIGH (ref 3.8–10.5)
WBC # FLD AUTO: 26.46 K/UL — HIGH (ref 3.8–10.5)
WBC UR QL: 4 /HPF — SIGNIFICANT CHANGE UP (ref 0–5)

## 2023-03-05 PROCEDURE — 99233 SBSQ HOSP IP/OBS HIGH 50: CPT | Mod: 25

## 2023-03-05 PROCEDURE — 36620 INSERTION CATHETER ARTERY: CPT | Mod: 58

## 2023-03-05 PROCEDURE — 93010 ELECTROCARDIOGRAM REPORT: CPT

## 2023-03-05 PROCEDURE — 71045 X-RAY EXAM CHEST 1 VIEW: CPT | Mod: 26

## 2023-03-05 RX ORDER — POTASSIUM CHLORIDE 20 MEQ
40 PACKET (EA) ORAL ONCE
Refills: 0 | Status: COMPLETED | OUTPATIENT
Start: 2023-03-05 | End: 2023-03-05

## 2023-03-05 RX ORDER — AMLODIPINE BESYLATE 2.5 MG/1
10 TABLET ORAL DAILY
Refills: 0 | Status: DISCONTINUED | OUTPATIENT
Start: 2023-03-05 | End: 2023-03-06

## 2023-03-05 RX ORDER — METOPROLOL TARTRATE 50 MG
25 TABLET ORAL EVERY 12 HOURS
Refills: 0 | Status: DISCONTINUED | OUTPATIENT
Start: 2023-03-05 | End: 2023-03-09

## 2023-03-05 RX ORDER — METOPROLOL TARTRATE 50 MG
25 TABLET ORAL ONCE
Refills: 0 | Status: COMPLETED | OUTPATIENT
Start: 2023-03-05 | End: 2023-03-05

## 2023-03-05 RX ORDER — INSULIN LISPRO 100/ML
VIAL (ML) SUBCUTANEOUS
Refills: 0 | Status: DISCONTINUED | OUTPATIENT
Start: 2023-03-05 | End: 2023-03-09

## 2023-03-05 RX ORDER — MAGNESIUM SULFATE 500 MG/ML
2 VIAL (ML) INJECTION ONCE
Refills: 0 | Status: COMPLETED | OUTPATIENT
Start: 2023-03-05 | End: 2023-03-05

## 2023-03-05 RX ORDER — INSULIN LISPRO 100/ML
3 VIAL (ML) SUBCUTANEOUS
Refills: 0 | Status: DISCONTINUED | OUTPATIENT
Start: 2023-03-05 | End: 2023-03-09

## 2023-03-05 RX ORDER — PANTOPRAZOLE SODIUM 20 MG/1
40 TABLET, DELAYED RELEASE ORAL
Refills: 0 | Status: DISCONTINUED | OUTPATIENT
Start: 2023-03-05 | End: 2023-03-09

## 2023-03-05 RX ORDER — METOPROLOL TARTRATE 50 MG
5 TABLET ORAL ONCE
Refills: 0 | Status: COMPLETED | OUTPATIENT
Start: 2023-03-05 | End: 2023-03-05

## 2023-03-05 RX ADMIN — Medication 3 UNIT(S): at 12:00

## 2023-03-05 RX ADMIN — Medication 500 MILLIGRAM(S): at 17:51

## 2023-03-05 RX ADMIN — GABAPENTIN 100 MILLIGRAM(S): 400 CAPSULE ORAL at 21:49

## 2023-03-05 RX ADMIN — HYDROMORPHONE HYDROCHLORIDE 0.5 MILLIGRAM(S): 2 INJECTION INTRAMUSCULAR; INTRAVENOUS; SUBCUTANEOUS at 04:23

## 2023-03-05 RX ADMIN — Medication 100 MILLIGRAM(S): at 04:33

## 2023-03-05 RX ADMIN — Medication 650 MILLIGRAM(S): at 05:10

## 2023-03-05 RX ADMIN — Medication 500 MILLIGRAM(S): at 05:10

## 2023-03-05 RX ADMIN — OXYCODONE HYDROCHLORIDE 5 MILLIGRAM(S): 5 TABLET ORAL at 10:05

## 2023-03-05 RX ADMIN — Medication 40 MILLIEQUIVALENT(S): at 13:46

## 2023-03-05 RX ADMIN — Medication 650 MILLIGRAM(S): at 11:58

## 2023-03-05 RX ADMIN — HEPARIN SODIUM 5000 UNIT(S): 5000 INJECTION INTRAVENOUS; SUBCUTANEOUS at 05:07

## 2023-03-05 RX ADMIN — POLYETHYLENE GLYCOL 3350 17 GRAM(S): 17 POWDER, FOR SOLUTION ORAL at 11:58

## 2023-03-05 RX ADMIN — Medication 5 MILLIGRAM(S): at 13:07

## 2023-03-05 RX ADMIN — CHLORHEXIDINE GLUCONATE 1 APPLICATION(S): 213 SOLUTION TOPICAL at 12:01

## 2023-03-05 RX ADMIN — Medication 2: at 22:11

## 2023-03-05 RX ADMIN — HYDROMORPHONE HYDROCHLORIDE 0.5 MILLIGRAM(S): 2 INJECTION INTRAMUSCULAR; INTRAVENOUS; SUBCUTANEOUS at 11:15

## 2023-03-05 RX ADMIN — Medication 25 MILLIGRAM(S): at 12:16

## 2023-03-05 RX ADMIN — Medication 5 MILLIGRAM(S): at 05:07

## 2023-03-05 RX ADMIN — AMLODIPINE BESYLATE 10 MILLIGRAM(S): 2.5 TABLET ORAL at 17:51

## 2023-03-05 RX ADMIN — OXYCODONE HYDROCHLORIDE 5 MILLIGRAM(S): 5 TABLET ORAL at 09:35

## 2023-03-05 RX ADMIN — Medication 25 MILLIGRAM(S): at 17:52

## 2023-03-05 RX ADMIN — ATORVASTATIN CALCIUM 80 MILLIGRAM(S): 80 TABLET, FILM COATED ORAL at 21:49

## 2023-03-05 RX ADMIN — HYDROMORPHONE HYDROCHLORIDE 0.5 MILLIGRAM(S): 2 INJECTION INTRAMUSCULAR; INTRAVENOUS; SUBCUTANEOUS at 04:33

## 2023-03-05 RX ADMIN — Medication 25 GRAM(S): at 13:46

## 2023-03-05 RX ADMIN — Medication 650 MILLIGRAM(S): at 12:28

## 2023-03-05 RX ADMIN — Medication 3 UNIT(S): at 16:10

## 2023-03-05 RX ADMIN — HEPARIN SODIUM 5000 UNIT(S): 5000 INJECTION INTRAVENOUS; SUBCUTANEOUS at 13:06

## 2023-03-05 RX ADMIN — Medication 650 MILLIGRAM(S): at 18:21

## 2023-03-05 RX ADMIN — Medication 650 MILLIGRAM(S): at 05:06

## 2023-03-05 RX ADMIN — FAMOTIDINE 20 MILLIGRAM(S): 10 INJECTION INTRAVENOUS at 11:57

## 2023-03-05 RX ADMIN — GABAPENTIN 100 MILLIGRAM(S): 400 CAPSULE ORAL at 13:06

## 2023-03-05 RX ADMIN — HYDROMORPHONE HYDROCHLORIDE 0.5 MILLIGRAM(S): 2 INJECTION INTRAMUSCULAR; INTRAVENOUS; SUBCUTANEOUS at 11:00

## 2023-03-05 RX ADMIN — Medication 81 MILLIGRAM(S): at 11:58

## 2023-03-05 RX ADMIN — HEPARIN SODIUM 5000 UNIT(S): 5000 INJECTION INTRAVENOUS; SUBCUTANEOUS at 21:50

## 2023-03-05 RX ADMIN — Medication 650 MILLIGRAM(S): at 17:51

## 2023-03-05 RX ADMIN — GABAPENTIN 100 MILLIGRAM(S): 400 CAPSULE ORAL at 05:06

## 2023-03-05 RX ADMIN — Medication 5 MILLIGRAM(S): at 12:28

## 2023-03-05 NOTE — PROGRESS NOTE ADULT - SUBJECTIVE AND OBJECTIVE BOX
HPI:  57 year old male with h/o repair coarctation of aorta at age 8, states aortic narrowing, presents for preop testing for scheduled AVR on 3/3/2023. His medical history significant for HTN, GERD, hyperlipidemia, bicuspid aortic valve, deviated nasal septum, lower back pain. He denies any chest pain, no SOB.  covid-19 PCR test on 3/1/2023.  (24 Feb 2023 11:03)      Patient seen and examined at the bedside.    Remained critically ill on continuous ICU monitoring.    OBJECTIVE:  Vital Signs Last 24 Hrs  T(C): 36.6 (05 Mar 2023 04:00), Max: 37.3 (04 Mar 2023 16:00)  T(F): 97.9 (05 Mar 2023 04:00), Max: 99.1 (04 Mar 2023 16:00)  HR: 59 (05 Mar 2023 07:00) (55 - 100)  BP: --  BP(mean): --  RR: 11 (05 Mar 2023 07:00) (9 - 33)  SpO2: 97% (05 Mar 2023 07:00) (89% - 98%)    Parameters below as of 05 Mar 2023 07:00  Patient On (Oxygen Delivery Method): nasal cannula, high flow  O2 Flow (L/min): 50  O2 Concentration (%): 50    Physical Exam:   General: multiple lines gtt & tubes   Neurology: Nonfocal  Eyes: bilateral pupils equal and reactive   ENT/Neck: Neck supple, trachea midline, No JVD   Respiratory: Rales noted bilaterally   CV:         [x] Sternal dressing        [x] sinus rhythm, [x] Temporary pacing- DDD- 100  Abdominal: Soft, NT, ND +BS,   Extremities: 1-2+ pedal edema noted, + peripheral pulses   Skin: No Rashes, Hematoma, Ecchymosis                            Assessment:  58 yo M s/p AVR on 3/3/23    At high risk for hemodynamic instability  At high risk for cardiac arrhythmias  Postoperative acute respiratory insufficiency  Stress hyperglycemia  Thrombocytopenia  Leukocytosis       Plan:   ***Neuro***  Continue close monitoring of neurological status. Addressed analgesic regimen to optimize function. Continued mobilization as tolerated      ***Cardiovascular***  58 yo M s/p AVR on 3/3/23. Invasive hemodynamic monitoring with a central venous catheter & an A-line were required for the continuous central venous and MAP/BP monitoring to ensure adequate cardiovascular support. ASA continued for graft occlusion-thromboembolism prophylaxis. Lipitor was also continued for long term graft patency. Temporary back up pacing wires in place. Lopressor for rate control.    ***Pulmonary***  Respiratory status required supplemental oxygen with high flow nasal cannula, close monitoring of breathing pattern and respiratory rate & the following of continuous pulse oximetry for support & to prevent decompensation.      ***Heme***  Anemia due to acute blood loss, no current plan for transfusion. Continue to monitor hemoglobin and hematocrit levels. Heparin continued for venous thromboembolism prophylaxis in addition to sequential compression devices.    ***GI***  Patient is tolerating a regular diet. Pepcid for stress ulcer prophylaxis. Bowel regimen with Miralax and senna. Reglan for gut motility.    ***Renal***  Optimize renal perfusion with adequate volume resuscitation and continued monitoring of urine output, fluid balance, electrolytes, and BUN/Creatinine.     ***ID***  Afebrile, white blood count elevated from 12.36 to 26.46.   Continue trending white blood count and monitoring fever curve.       ***Endocrine***   Metabolic stability, stress hyperglycemia required an IV regular Insulin drip while following serial glucose levels to help achieve and maintain euglycemia.            Patient requires continuous monitoring with bedside rhythm monitoring, pulse oximetry monitoring, and continuous central venous and arterial pressure monitoring; and intermittent blood gas analysis. Care plan discussed with the ICU care team.   Patient remained critical, at risk for life threatening decompensation.    I have spent 30 minutes providing critical care management to this patient.    By signing my name below, I, Maria D'Amico, attest that this documentation has been prepared under the direction and in the presence of Guerda Gillespie MD   Electronically signed: Maria D'Amico, Scribe, 03-05-23 @ 07:26    I, Guerda Gillespie, personally performed the services described in this documentation. all medical record entries made by the crystalibsebastián were at my direction and in my presence. I have reviewed the chart and agree that the record reflects my personal performance and is accurate and complete  Electronically signed: Guerda Gillespie MD  HPI:  57 year old male with h/o repair coarctation of aorta at age 8, states aortic narrowing, presents for preop testing for scheduled AVR on 3/3/2023. His medical history significant for HTN, GERD, hyperlipidemia, bicuspid aortic valve, deviated nasal septum, lower back pain. He denies any chest pain, no SOB.  covid-19 PCR test on 3/1/2023.  (24 Feb 2023 11:03)      Patient seen and examined at the bedside.    Remained critically ill on continuous ICU monitoring.    OBJECTIVE:  Vital Signs Last 24 Hrs  T(C): 36.6 (05 Mar 2023 04:00), Max: 37.3 (04 Mar 2023 16:00)  T(F): 97.9 (05 Mar 2023 04:00), Max: 99.1 (04 Mar 2023 16:00)  HR: 59 (05 Mar 2023 07:00) (55 - 100)  BP: --  BP(mean): --  RR: 11 (05 Mar 2023 07:00) (9 - 33)  SpO2: 97% (05 Mar 2023 07:00) (89% - 98%)    Parameters below as of 05 Mar 2023 07:00  Patient On (Oxygen Delivery Method): nasal cannula, high flow  O2 Flow (L/min): 50  O2 Concentration (%): 50    Physical Exam:   General: multiple lines gtt & tubes   Neurology: Nonfocal  Eyes: bilateral pupils equal and reactive   ENT/Neck: Neck supple, trachea midline, No JVD   Respiratory: Rales noted bilaterally   CV:         [x] Sternal dressing        [x] sinus rhythm, [x] Temporary pacing- DDD- 100  Abdominal: Soft, NT, ND +BS,   Extremities: 1-2+ pedal edema noted, + peripheral pulses   Skin: No Rashes, Hematoma, Ecchymosis                            Assessment:  58 yo M s/p AVR on 3/3/23    At high risk for hemodynamic instability  At high risk for cardiac arrhythmias  Postoperative acute respiratory insufficiency  Stress hyperglycemia  Thrombocytopenia  Leukocytosis       Plan:   ***Neuro***  Continue close monitoring of neurological status. Addressed analgesic regimen to optimize function. Continued mobilization as tolerated      ***Cardiovascular***  58 yo M s/p AVR on 3/3/23. Invasive hemodynamic monitoring with a central venous catheter & an A-line were required for the continuous central venous and MAP/BP monitoring to ensure adequate cardiovascular support. ASA continued for graft occlusion-thromboembolism prophylaxis. Lipitor was also continued for long term graft patency. Temporary epicardial pacing wires in place. Lopressor for rate control.    ***Pulmonary***  Respiratory status required supplemental oxygen with high flow nasal cannula, close monitoring of breathing pattern and respiratory rate & the following of continuous pulse oximetry for support & to prevent decompensation.      ***Heme***  Anemia due to acute blood loss, no current plan for transfusion. Continue to monitor hemoglobin and hematocrit levels. Heparin continued for venous thromboembolism prophylaxis in addition to sequential compression devices.    ***GI***  Patient is tolerating a regular diet. Pepcid for stress ulcer prophylaxis. Bowel regimen with Miralax and senna. Reglan for gut motility.    ***Renal***  Optimize renal perfusion with adequate volume resuscitation and continued monitoring of urine output, fluid balance, electrolytes, and BUN/Creatinine.     ***ID***  Afebrile, white blood count elevated from 12.36 to 26.46.   Continue trending white blood count and monitoring fever curve.       ***Endocrine***   Metabolic stability, stress hyperglycemia required an IV regular Insulin drip while following serial glucose levels to help achieve and maintain euglycemia.        Patient requires continuous monitoring with bedside rhythm monitoring, pulse oximetry monitoring, and continuous central venous and arterial pressure monitoring; and intermittent blood gas analysis. Care plan discussed with the ICU care team.   Patient remained critical, at risk for life threatening decompensation.    I have spent 30 minutes providing critical care management to this patient.    By signing my name below, I, Maria D'Amico, attest that this documentation has been prepared under the direction and in the presence of Guerda Gillespie MD   Electronically signed: Maria D'Amico, Scribe, 03-05-23 @ 07:26    I, Guerda Gillespie, personally performed the services described in this documentation. all medical record entries made by the crystalibsebastián were at my direction and in my presence. I have reviewed the chart and agree that the record reflects my personal performance and is accurate and complete  Electronically signed: Guerda Gillespie MD  HPI:  57 year old male with h/o repair coarctation of aorta at age 8, states aortic narrowing, presents for preop testing for scheduled AVR on 3/3/2023. His medical history significant for HTN, GERD, hyperlipidemia, bicuspid aortic valve, deviated nasal septum, lower back pain. He denies any chest pain, no SOB.  covid-19 PCR test on 3/1/2023.  (24 Feb 2023 11:03)      Patient seen and examined at the bedside.    Remained critically ill on continuous ICU monitoring.    OBJECTIVE:  Vital Signs Last 24 Hrs  T(C): 36.6 (05 Mar 2023 04:00), Max: 37.3 (04 Mar 2023 16:00)  T(F): 97.9 (05 Mar 2023 04:00), Max: 99.1 (04 Mar 2023 16:00)  HR: 59 (05 Mar 2023 07:00) (55 - 100)  BP: --  BP(mean): --  RR: 11 (05 Mar 2023 07:00) (9 - 33)  SpO2: 97% (05 Mar 2023 07:00) (89% - 98%)    Parameters below as of 05 Mar 2023 07:00  Patient On (Oxygen Delivery Method): nasal cannula, high flow  O2 Flow (L/min): 50  O2 Concentration (%): 50    Physical Exam:   General: multiple lines gtt & tubes   Neurology: Nonfocal  Eyes: bilateral pupils equal and reactive   ENT/Neck: Neck supple, trachea midline, No JVD   Respiratory: Rales noted bilaterally   CV: [x] Sternal dressing        [x] sinus rhythm, [x] Temporary pacing- DDD- 100  Abdominal: Soft, NT, ND +BS,   Extremities: 1-2+ pedal edema noted, + peripheral pulses   Skin: No Rashes, Hematoma, Ecchymosis                            Assessment:  56 yo M s/p AVR on 3/3/23    At high risk for hemodynamic instability  At high risk for cardiac arrhythmias  Postoperative acute respiratory insufficiency  Stress hyperglycemia  Thrombocytopenia  Leukocytosis       Plan:   ***Neuro***  Continue close monitoring of neurological status. Addressed analgesic regimen to optimize function. Continued mobilization as tolerated      ***Cardiovascular***  56 yo M s/p AVR on 3/3/23. Invasive hemodynamic monitoring with a central venous catheter & an A-line were required for the continuous central venous and MAP/BP monitoring to ensure adequate cardiovascular support. ASA continued for graft occlusion-thromboembolism prophylaxis. Lipitor was also continued for long term graft patency. Temporary epicardial pacing wires in place. Lopressor for rate control.     ***Pulmonary***  Respiratory status required supplemental oxygen with high flow nasal cannula, close monitoring of breathing pattern and respiratory rate & the following of continuous pulse oximetry for support & to prevent decompensation.      ***Heme***  Anemia due to acute blood loss, no current plan for transfusion. Continue to monitor hemoglobin and hematocrit levels. Heparin continued for venous thromboembolism prophylaxis in addition to sequential compression devices.    ***GI***  Patient is tolerating a regular diet. Pepcid for stress ulcer prophylaxis. Bowel regimen with Miralax and senna. Reglan for gut motility.    ***Renal***  Optimize renal perfusion with adequate volume resuscitation and continued monitoring of urine output, fluid balance, electrolytes, and BUN/Creatinine.     ***ID***  Afebrile, white blood count elevated from 12.36 to 26.46.   Continue trending white blood count and monitoring fever curve.     ***Endocrine***   Metabolic stability, stress hyperglycemia required an IV regular Insulin drip while following serial glucose levels to help achieve and maintain euglycemia.        Patient requires continuous monitoring with bedside rhythm monitoring, pulse oximetry monitoring, and continuous central venous and arterial pressure monitoring; and intermittent blood gas analysis. Care plan discussed with the ICU care team.   Patient remained critical, at risk for life threatening decompensation.    I have spent 30 minutes providing critical care management to this patient.    By signing my name below, I, Maria D'Amico, attest that this documentation has been prepared under the direction and in the presence of Guerda Gillespie MD   Electronically signed: Maria D'Amico, Scribe, 03-05-23 @ 07:26    I, Guerda Gillespie, personally performed the services described in this documentation. all medical record entries made by the crystalibsebastián were at my direction and in my presence. I have reviewed the chart and agree that the record reflects my personal performance and is accurate and complete  Electronically signed: Guerda Gillespie MD  HPI:  57 year old male with h/o repair coarctation of aorta at age 8, states aortic narrowing, presents for preop testing for scheduled AVR on 3/3/2023. His medical history significant for HTN, GERD, hyperlipidemia, bicuspid aortic valve, deviated nasal septum, lower back pain. He denies any chest pain, no SOB.  covid-19 PCR test on 3/1/2023.  (24 Feb 2023 11:03)    Patient underwent AVR 3/3. He has had post operative hypoxia.    Patient seen and examined at the bedside.    Remained critically ill on continuous ICU monitoring.    OBJECTIVE:  Vital Signs Last 24 Hrs  T(C): 36.6 (05 Mar 2023 04:00), Max: 37.3 (04 Mar 2023 16:00)  T(F): 97.9 (05 Mar 2023 04:00), Max: 99.1 (04 Mar 2023 16:00)  HR: 59 (05 Mar 2023 07:00) (55 - 100)  BP: --  BP(mean): --  RR: 11 (05 Mar 2023 07:00) (9 - 33)  SpO2: 97% (05 Mar 2023 07:00) (89% - 98%)    Parameters below as of 05 Mar 2023 07:00  Patient On (Oxygen Delivery Method): nasal cannula, high flow  O2 Flow (L/min): 50  O2 Concentration (%): 50    Physical Exam:   General: Normal body habitus, splinting at rest   Neurology: Nonfocal  Eyes: bilateral pupils equal and reactive   ENT/Neck: Neck supple, trachea midline, No JVD   Respiratory: Decreased BS left base  CV: [x] Sternal dressing        [x] sinus rhythm, [x] Temporary pacing- DDD- 100  Abdominal: Soft, NT, ND +BS,   Extremities: No pedal edema noted, + peripheral pulses   Skin: No Rashes, Hematoma, Ecchymosis                            Assessment:  56 yo M s/p AVR on 3/3/23    At high risk for hemodynamic instability  At high risk for cardiac arrhythmias  Postoperative acute respiratory insufficiency  Stress hyperglycemia  Thrombocytopenia  Leukocytosis       Plan:   ***Neuro***  Continue close monitoring of neurological status. Addressed analgesic regimen to optimize function. Continued mobilization as tolerated      ***Cardiovascular***  56 yo M s/p AVR on 3/3/23. Lopressor ordered for hypertension. Invasive hemodynamic monitoring with a central venous catheter & an A-line were required for the continuous central venous and MAP/BP monitoring to ensure adequate cardiovascular support. ASA continued for graft occlusion-thromboembolism prophylaxis. Lipitor was also continued for long term graft patency. Temporary epicardial pacing wires in place.      ***Pulmonary***  Respiratory status required supplemental oxygen with high flow nasal cannula, close monitoring of breathing pattern and respiratory rate & the following of continuous pulse oximetry for support & to prevent decompensation. Hypoxia in part due to atelectasis, ensure adequate pain control to allow deeper breathing and mobilization.     ***Heme***  Anemia due to acute blood loss, no current plan for transfusion. Continue to monitor hemoglobin and hematocrit levels. Heparin continued for venous thromboembolism prophylaxis in addition to sequential compression devices.    ***GI***  Patient is tolerating a regular diet. Pepcid for stress ulcer prophylaxis. Bowel regimen with Miralax and senna. Reglan for gut motility.    ***Renal***  Optimize renal perfusion with adequate volume resuscitation and continued monitoring of urine output, fluid balance, electrolytes, and BUN/Creatinine.     ***ID***  Afebrile, white blood count elevated from 12.36 to 26.46. Likely demargination.  Continue trending white blood count and monitoring fever curve.     ***Endocrine***  Metabolic stability, stress hyperglycemia required an IV regular Insulin drip while following serial glucose levels to help achieve and maintain euglycemia.        Patient requires continuous monitoring with bedside rhythm monitoring, pulse oximetry monitoring, and continuous central venous and arterial pressure monitoring; and intermittent blood gas analysis. Care plan discussed with the ICU care team.       By signing my name below, I, Maria D'Amico, attest that this documentation has been prepared under the direction and in the presence of Guerda Gillespie MD   Electronically signed: Maria D'Amico, Scribe, 03-05-23 @ 07:26    I, Guerda Gillespie, personally performed the services described in this documentation. all medical record entries made by the crystalibsebastián were at my direction and in my presence. I have reviewed the chart and agree that the record reflects my personal performance and is accurate and complete  Electronically signed: Guerda Gillespie MD

## 2023-03-06 LAB
ALBUMIN SERPL ELPH-MCNC: 4.1 G/DL — SIGNIFICANT CHANGE UP (ref 3.3–5)
ALP SERPL-CCNC: 88 U/L — SIGNIFICANT CHANGE UP (ref 40–120)
ALT FLD-CCNC: 18 U/L — SIGNIFICANT CHANGE UP (ref 10–45)
ANION GAP SERPL CALC-SCNC: 12 MMOL/L — SIGNIFICANT CHANGE UP (ref 5–17)
AST SERPL-CCNC: 18 U/L — SIGNIFICANT CHANGE UP (ref 10–40)
BASOPHILS # BLD AUTO: 0.03 K/UL — SIGNIFICANT CHANGE UP (ref 0–0.2)
BASOPHILS NFR BLD AUTO: 0.1 % — SIGNIFICANT CHANGE UP (ref 0–2)
BILIRUB SERPL-MCNC: 0.6 MG/DL — SIGNIFICANT CHANGE UP (ref 0.2–1.2)
BUN SERPL-MCNC: 29 MG/DL — HIGH (ref 7–23)
CALCIUM SERPL-MCNC: 9.2 MG/DL — SIGNIFICANT CHANGE UP (ref 8.4–10.5)
CHLORIDE SERPL-SCNC: 103 MMOL/L — SIGNIFICANT CHANGE UP (ref 96–108)
CO2 SERPL-SCNC: 25 MMOL/L — SIGNIFICANT CHANGE UP (ref 22–31)
CREAT SERPL-MCNC: 1.09 MG/DL — SIGNIFICANT CHANGE UP (ref 0.5–1.3)
EGFR: 79 ML/MIN/1.73M2 — SIGNIFICANT CHANGE UP
EOSINOPHIL # BLD AUTO: 0 K/UL — SIGNIFICANT CHANGE UP (ref 0–0.5)
EOSINOPHIL NFR BLD AUTO: 0 % — SIGNIFICANT CHANGE UP (ref 0–6)
GAS PNL BLDA: SIGNIFICANT CHANGE UP
GAS PNL BLDA: SIGNIFICANT CHANGE UP
GLUCOSE BLDC GLUCOMTR-MCNC: 105 MG/DL — HIGH (ref 70–99)
GLUCOSE BLDC GLUCOMTR-MCNC: 105 MG/DL — HIGH (ref 70–99)
GLUCOSE BLDC GLUCOMTR-MCNC: 107 MG/DL — HIGH (ref 70–99)
GLUCOSE BLDC GLUCOMTR-MCNC: 119 MG/DL — HIGH (ref 70–99)
GLUCOSE SERPL-MCNC: 121 MG/DL — HIGH (ref 70–99)
HCT VFR BLD CALC: 35.7 % — LOW (ref 39–50)
HGB BLD-MCNC: 11.7 G/DL — LOW (ref 13–17)
IMM GRANULOCYTES NFR BLD AUTO: 0.8 % — SIGNIFICANT CHANGE UP (ref 0–0.9)
LYMPHOCYTES # BLD AUTO: 1.66 K/UL — SIGNIFICANT CHANGE UP (ref 1–3.3)
LYMPHOCYTES # BLD AUTO: 7.5 % — LOW (ref 13–44)
MAGNESIUM SERPL-MCNC: 2.3 MG/DL — SIGNIFICANT CHANGE UP (ref 1.6–2.6)
MCHC RBC-ENTMCNC: 26.8 PG — LOW (ref 27–34)
MCHC RBC-ENTMCNC: 32.8 GM/DL — SIGNIFICANT CHANGE UP (ref 32–36)
MCV RBC AUTO: 81.7 FL — SIGNIFICANT CHANGE UP (ref 80–100)
MONOCYTES # BLD AUTO: 1.56 K/UL — HIGH (ref 0–0.9)
MONOCYTES NFR BLD AUTO: 7 % — SIGNIFICANT CHANGE UP (ref 2–14)
NEUTROPHILS # BLD AUTO: 18.82 K/UL — HIGH (ref 1.8–7.4)
NEUTROPHILS NFR BLD AUTO: 84.6 % — HIGH (ref 43–77)
NRBC # BLD: 0 /100 WBCS — SIGNIFICANT CHANGE UP (ref 0–0)
PHOSPHATE SERPL-MCNC: 2.7 MG/DL — SIGNIFICANT CHANGE UP (ref 2.5–4.5)
PLATELET # BLD AUTO: 156 K/UL — SIGNIFICANT CHANGE UP (ref 150–400)
POTASSIUM BLDA-SCNC: 4.1 MMOL/L — SIGNIFICANT CHANGE UP (ref 3.5–5.1)
POTASSIUM SERPL-MCNC: 4.1 MMOL/L — SIGNIFICANT CHANGE UP (ref 3.5–5.3)
POTASSIUM SERPL-SCNC: 4.1 MMOL/L — SIGNIFICANT CHANGE UP (ref 3.5–5.3)
PROT SERPL-MCNC: 6.1 G/DL — SIGNIFICANT CHANGE UP (ref 6–8.3)
RBC # BLD: 4.37 M/UL — SIGNIFICANT CHANGE UP (ref 4.2–5.8)
RBC # FLD: 13 % — SIGNIFICANT CHANGE UP (ref 10.3–14.5)
SODIUM SERPL-SCNC: 140 MMOL/L — SIGNIFICANT CHANGE UP (ref 135–145)
WBC # BLD: 22.25 K/UL — HIGH (ref 3.8–10.5)
WBC # FLD AUTO: 22.25 K/UL — HIGH (ref 3.8–10.5)

## 2023-03-06 PROCEDURE — 71045 X-RAY EXAM CHEST 1 VIEW: CPT | Mod: 26

## 2023-03-06 PROCEDURE — 99232 SBSQ HOSP IP/OBS MODERATE 35: CPT

## 2023-03-06 RX ORDER — POTASSIUM CHLORIDE 20 MEQ
40 PACKET (EA) ORAL ONCE
Refills: 0 | Status: COMPLETED | OUTPATIENT
Start: 2023-03-06 | End: 2023-03-06

## 2023-03-06 RX ORDER — SODIUM CHLORIDE 0.65 %
1 AEROSOL, SPRAY (ML) NASAL
Refills: 0 | Status: DISCONTINUED | OUTPATIENT
Start: 2023-03-06 | End: 2023-03-09

## 2023-03-06 RX ORDER — ACETAMINOPHEN 500 MG
650 TABLET ORAL EVERY 6 HOURS
Refills: 0 | Status: DISCONTINUED | OUTPATIENT
Start: 2023-03-06 | End: 2023-03-09

## 2023-03-06 RX ORDER — FUROSEMIDE 40 MG
40 TABLET ORAL ONCE
Refills: 0 | Status: COMPLETED | OUTPATIENT
Start: 2023-03-06 | End: 2023-03-06

## 2023-03-06 RX ADMIN — Medication 650 MILLIGRAM(S): at 12:40

## 2023-03-06 RX ADMIN — Medication 500 MILLIGRAM(S): at 06:32

## 2023-03-06 RX ADMIN — ATORVASTATIN CALCIUM 80 MILLIGRAM(S): 80 TABLET, FILM COATED ORAL at 21:39

## 2023-03-06 RX ADMIN — HEPARIN SODIUM 5000 UNIT(S): 5000 INJECTION INTRAVENOUS; SUBCUTANEOUS at 06:21

## 2023-03-06 RX ADMIN — OXYCODONE HYDROCHLORIDE 5 MILLIGRAM(S): 5 TABLET ORAL at 03:45

## 2023-03-06 RX ADMIN — Medication 500 MILLIGRAM(S): at 17:51

## 2023-03-06 RX ADMIN — OXYCODONE HYDROCHLORIDE 5 MILLIGRAM(S): 5 TABLET ORAL at 03:15

## 2023-03-06 RX ADMIN — OXYCODONE HYDROCHLORIDE 10 MILLIGRAM(S): 5 TABLET ORAL at 06:15

## 2023-03-06 RX ADMIN — Medication 25 MILLIGRAM(S): at 06:20

## 2023-03-06 RX ADMIN — HEPARIN SODIUM 5000 UNIT(S): 5000 INJECTION INTRAVENOUS; SUBCUTANEOUS at 13:49

## 2023-03-06 RX ADMIN — AMLODIPINE BESYLATE 10 MILLIGRAM(S): 2.5 TABLET ORAL at 06:21

## 2023-03-06 RX ADMIN — CHLORHEXIDINE GLUCONATE 1 APPLICATION(S): 213 SOLUTION TOPICAL at 11:59

## 2023-03-06 RX ADMIN — Medication 3 UNIT(S): at 16:27

## 2023-03-06 RX ADMIN — Medication 40 MILLIEQUIVALENT(S): at 12:09

## 2023-03-06 RX ADMIN — Medication 650 MILLIGRAM(S): at 00:20

## 2023-03-06 RX ADMIN — Medication 3 UNIT(S): at 11:38

## 2023-03-06 RX ADMIN — Medication 650 MILLIGRAM(S): at 12:10

## 2023-03-06 RX ADMIN — Medication 40 MILLIGRAM(S): at 06:21

## 2023-03-06 RX ADMIN — Medication 81 MILLIGRAM(S): at 12:10

## 2023-03-06 RX ADMIN — HEPARIN SODIUM 5000 UNIT(S): 5000 INJECTION INTRAVENOUS; SUBCUTANEOUS at 21:39

## 2023-03-06 RX ADMIN — OXYCODONE HYDROCHLORIDE 10 MILLIGRAM(S): 5 TABLET ORAL at 06:45

## 2023-03-06 RX ADMIN — Medication 650 MILLIGRAM(S): at 22:10

## 2023-03-06 RX ADMIN — Medication 650 MILLIGRAM(S): at 06:20

## 2023-03-06 RX ADMIN — GABAPENTIN 100 MILLIGRAM(S): 400 CAPSULE ORAL at 06:20

## 2023-03-06 RX ADMIN — Medication 650 MILLIGRAM(S): at 21:40

## 2023-03-06 RX ADMIN — Medication 25 MILLIGRAM(S): at 17:51

## 2023-03-06 RX ADMIN — GABAPENTIN 100 MILLIGRAM(S): 400 CAPSULE ORAL at 13:49

## 2023-03-06 RX ADMIN — Medication 3 UNIT(S): at 07:42

## 2023-03-06 RX ADMIN — GABAPENTIN 100 MILLIGRAM(S): 400 CAPSULE ORAL at 21:39

## 2023-03-06 RX ADMIN — POLYETHYLENE GLYCOL 3350 17 GRAM(S): 17 POWDER, FOR SOLUTION ORAL at 12:10

## 2023-03-06 RX ADMIN — PANTOPRAZOLE SODIUM 40 MILLIGRAM(S): 20 TABLET, DELAYED RELEASE ORAL at 06:25

## 2023-03-06 NOTE — PROGRESS NOTE ADULT - SUBJECTIVE AND OBJECTIVE BOX
Patient seen and examined at the bedside.    Remained critically ill on continuous ICU monitoring.      Brief Summary:  56 yo M s/p AVR on 3/3/2023      24 Hour events:      OBJECTIVE:  Vital Signs Last 24 Hrs  T(C): 36.3 (06 Mar 2023 04:00), Max: 36.7 (05 Mar 2023 08:00)  T(F): 97.3 (06 Mar 2023 04:00), Max: 98.1 (05 Mar 2023 08:00)  HR: 85 (06 Mar 2023 06:00) (56 - 95)  BP: 105/59 (05 Mar 2023 10:00) (103/58 - 107/60)  BP(mean): 77 (05 Mar 2023 10:00) (74 - 79)  RR: 42 (06 Mar 2023 06:00) (11 - 42)  SpO2: 91% (06 Mar 2023 06:00) (91% - 97%)    Parameters below as of 06 Mar 2023 04:00  Patient On (Oxygen Delivery Method): nasal cannula, high flow  O2 Flow (L/min): 40  O2 Concentration (%): 60                Physical Exam:   General: Sitting up, tired, but answering questions   Neurology: Oriented, following commands   Eyes: Bilateral pupils reactive   Respiratory: Clear bilaterally   CV: Sinus rhythm  Abdominal: Soft, Nontender  Extremities: Warm       -------------------------------------------------------------------------------------------------------------------------------  Labs:                        11.7   22.25 )-----------( 156      ( 06 Mar 2023 00:38 )             35.7     03-06    140  |  103  |  29<H>  ----------------------------<  121<H>  4.1   |  25  |  1.09    Ca    9.2      06 Mar 2023 00:38  Phos  2.7     03-06  Mg     2.3     03-06    TPro  6.1  /  Alb  4.1  /  TBili  0.6  /  DBili  x   /  AST  18  /  ALT  18  /  AlkPhos  88  03-06    LIVER FUNCTIONS - ( 06 Mar 2023 00:38 )  Alb: 4.1 g/dL / Pro: 6.1 g/dL / ALK PHOS: 88 U/L / ALT: 18 U/L / AST: 18 U/L / GGT: x             ABG - ( 06 Mar 2023 00:00 )  pH, Arterial: 7.48  pH, Blood: x     /  pCO2: 37    /  pO2: 67    / HCO3: 28    / Base Excess: 4.0   /  SaO2: 94.0      ------------------------------------------------------------------------------------------------------------------------------  Assessment:  56 yo M s/p AVR on 3/3/23    At high risk for hemodynamic instability  At high risk for cardiac arrhythmias  Postoperative acute respiratory insufficiency  Stress hyperglycemia  Leukocytosis      Plan:   ***Neuro***  Postoperative acute pain control with Tylenol, Gabapentin, and Oxycodone  PT/ Ambulate as tolerated    ***Cardiovascular***   At high risk for hemodynamic instability and cardiac arrhythmias.  Pacing wires if needed for backup  ASA/Statin Daily  Norvasc and Lopressor for BP management   Monitor chest tube output.    ***Pulmonary***  Postoperative acute respiratory insufficiency   Deep breathing and coughing exercises.  Wean oxygen as able.    ***GI***  Tolerating consistent carb diet   Pepcid for stress ulcer ppx  Bowel regimen    ***Renal***  Trend creatinine  Replete electrolytes as indicated.    ***ID***  No active antibiotics coverage     ***Endocrine***  Stress Hyperglycemia  Insulin infusion.    ***Hematology***  Acute blood loss anemia - no current transfusion indication   DVT prophylaxis with SQ Heparin      Patient requires continuous monitoring with bedside rhythm monitoring, pulse oximetry monitoring, and continuous central venous and arterial pressure monitoring; and intermittent blood gas analysis. Care plan discussed with the ICU care team.   Patient remained critical, at risk for life threatening decompensation.    I have spent 30 minutes providing critical care management to this patient.    By signing my name below, I, Hipolito Emmanuel, attest that this documentation has been prepared under the direction and in the presence of Kate Terrazas MD  Electronically signed: Hipolito Benitez, 03-06-23 @ 06:39    I, Kate Terrazas MD, personally performed the services described in this documentation. all medical record entries made by the scribe were at my direction and in my presence. I have reviewed the chart and agree that the record reflects my personal performance and is accurate and complete  Electronically signed: Kate Terrazas MD Patient seen and examined at the bedside.    Remains critically ill on continuous ICU monitoring.      Brief Summary:  58 yo M s/p AVR on 3/3/2023    24 Hour events:  No acute events overnight.      Objective:  Vital Signs Last 24 Hrs  T(C): 36.3 (06 Mar 2023 04:00), Max: 36.7 (05 Mar 2023 08:00)  T(F): 97.3 (06 Mar 2023 04:00), Max: 98.1 (05 Mar 2023 08:00)  HR: 85 (06 Mar 2023 06:00) (56 - 95)  BP: 105/59 (05 Mar 2023 10:00) (103/58 - 107/60)  BP(mean): 77 (05 Mar 2023 10:00) (74 - 79)  RR: 42 (06 Mar 2023 06:00) (11 - 42)  SpO2: 91% (06 Mar 2023 06:00) (91% - 97%)              Physical Exam:   General: Sitting up, interactive.  Neurology: Oriented, following commands   Eyes: Bilateral pupils reactive   Respiratory: Clear bilaterally   CV: Sinus rhythm  Abdominal: Soft, Nontender  Extremities: Warm       -------------------------------------------------------------------------------------------------------------------------------  Labs:                        11.7   22.25 )-----------( 156      ( 06 Mar 2023 00:38 )             35.7     03-06    140  |  103  |  29<H>  ----------------------------<  121<H>  4.1   |  25  |  1.09    Ca    9.2      06 Mar 2023 00:38  Phos  2.7     03-06  Mg     2.3     03-06    TPro  6.1  /  Alb  4.1  /  TBili  0.6  /  DBili  x   /  AST  18  /  ALT  18  /  AlkPhos  88  03-06    LIVER FUNCTIONS - ( 06 Mar 2023 00:38 )  Alb: 4.1 g/dL / Pro: 6.1 g/dL / ALK PHOS: 88 U/L / ALT: 18 U/L / AST: 18 U/L / GGT: x             ABG - ( 06 Mar 2023 00:00 )  pH, Arterial: 7.48  pH, Blood: x     /  pCO2: 37    /  pO2: 67    / HCO3: 28    / Base Excess: 4.0   /  SaO2: 94.0      ------------------------------------------------------------------------------------------------------------------------------  Assessment:  58 yo M s/p AVR on 3/3/23    Plan:   ***Neuro***  Postoperative acute pain control with Tylenol, Gabapentin, and Oxycodone  PT/ Ambulate as tolerated    ***Cardiovascular***   ASA/Statin Daily  Beta blocker for heart rate control.  Norvasc for BP management   Monitor chest tube output.    ***Pulmonary***  Deep breathing and coughing exercises.  Wean oxygen as able.    ***GI***  Tolerating consistent carb diet   Pepcid for stress ulcer ppx  Bowel regimen    ***Renal***  Trend creatinine  Replete electrolytes as indicated.    ***ID***  No active antibiotics coverage     ***Endocrine***  Stress Hyperglycemia  Insulin sliding scale.    ***Hematology***  Acute blood loss anemia - no current transfusion indication   DVT prophylaxis with SQ Heparin        By signing my name below, IHipolito, attest that this documentation has been prepared under the direction and in the presence of Kate Terrazas MD  Electronically signed: Hipolito Benitez, 03-06-23 @ 06:39    Kate GARCIA MD, personally performed the services described in this documentation. all medical record entries made by the scribe were at my direction and in my presence. I have reviewed the chart and agree that the record reflects my personal performance and is accurate and complete  Electronically signed: Kate Terrazas MD

## 2023-03-07 DIAGNOSIS — Z95.2 PRESENCE OF PROSTHETIC HEART VALVE: ICD-10-CM

## 2023-03-07 LAB
ALBUMIN SERPL ELPH-MCNC: 4.1 G/DL — SIGNIFICANT CHANGE UP (ref 3.3–5)
ALP SERPL-CCNC: 104 U/L — SIGNIFICANT CHANGE UP (ref 40–120)
ALT FLD-CCNC: 16 U/L — SIGNIFICANT CHANGE UP (ref 10–45)
ANION GAP SERPL CALC-SCNC: 12 MMOL/L — SIGNIFICANT CHANGE UP (ref 5–17)
AST SERPL-CCNC: 15 U/L — SIGNIFICANT CHANGE UP (ref 10–40)
BASOPHILS # BLD AUTO: 0.04 K/UL — SIGNIFICANT CHANGE UP (ref 0–0.2)
BASOPHILS NFR BLD AUTO: 0.3 % — SIGNIFICANT CHANGE UP (ref 0–2)
BILIRUB SERPL-MCNC: 0.9 MG/DL — SIGNIFICANT CHANGE UP (ref 0.2–1.2)
BUN SERPL-MCNC: 27 MG/DL — HIGH (ref 7–23)
CALCIUM SERPL-MCNC: 9.1 MG/DL — SIGNIFICANT CHANGE UP (ref 8.4–10.5)
CHLORIDE SERPL-SCNC: 99 MMOL/L — SIGNIFICANT CHANGE UP (ref 96–108)
CO2 SERPL-SCNC: 27 MMOL/L — SIGNIFICANT CHANGE UP (ref 22–31)
CREAT SERPL-MCNC: 1.13 MG/DL — SIGNIFICANT CHANGE UP (ref 0.5–1.3)
EGFR: 76 ML/MIN/1.73M2 — SIGNIFICANT CHANGE UP
EOSINOPHIL # BLD AUTO: 0.18 K/UL — SIGNIFICANT CHANGE UP (ref 0–0.5)
EOSINOPHIL NFR BLD AUTO: 1.2 % — SIGNIFICANT CHANGE UP (ref 0–6)
GLUCOSE BLDC GLUCOMTR-MCNC: 113 MG/DL — HIGH (ref 70–99)
GLUCOSE BLDC GLUCOMTR-MCNC: 115 MG/DL — HIGH (ref 70–99)
GLUCOSE BLDC GLUCOMTR-MCNC: 115 MG/DL — HIGH (ref 70–99)
GLUCOSE BLDC GLUCOMTR-MCNC: 152 MG/DL — HIGH (ref 70–99)
GLUCOSE SERPL-MCNC: 109 MG/DL — HIGH (ref 70–99)
HCT VFR BLD CALC: 38.5 % — LOW (ref 39–50)
HGB BLD-MCNC: 13.1 G/DL — SIGNIFICANT CHANGE UP (ref 13–17)
IMM GRANULOCYTES NFR BLD AUTO: 1.2 % — HIGH (ref 0–0.9)
LYMPHOCYTES # BLD AUTO: 14.4 % — SIGNIFICANT CHANGE UP (ref 13–44)
LYMPHOCYTES # BLD AUTO: 2.2 K/UL — SIGNIFICANT CHANGE UP (ref 1–3.3)
MAGNESIUM SERPL-MCNC: 2 MG/DL — SIGNIFICANT CHANGE UP (ref 1.6–2.6)
MCHC RBC-ENTMCNC: 27.5 PG — SIGNIFICANT CHANGE UP (ref 27–34)
MCHC RBC-ENTMCNC: 34 GM/DL — SIGNIFICANT CHANGE UP (ref 32–36)
MCV RBC AUTO: 80.7 FL — SIGNIFICANT CHANGE UP (ref 80–100)
MONOCYTES # BLD AUTO: 1.32 K/UL — HIGH (ref 0–0.9)
MONOCYTES NFR BLD AUTO: 8.6 % — SIGNIFICANT CHANGE UP (ref 2–14)
NEUTROPHILS # BLD AUTO: 11.38 K/UL — HIGH (ref 1.8–7.4)
NEUTROPHILS NFR BLD AUTO: 74.3 % — SIGNIFICANT CHANGE UP (ref 43–77)
NRBC # BLD: 0 /100 WBCS — SIGNIFICANT CHANGE UP (ref 0–0)
PHOSPHATE SERPL-MCNC: 3.8 MG/DL — SIGNIFICANT CHANGE UP (ref 2.5–4.5)
PLATELET # BLD AUTO: 184 K/UL — SIGNIFICANT CHANGE UP (ref 150–400)
POTASSIUM SERPL-MCNC: 4.1 MMOL/L — SIGNIFICANT CHANGE UP (ref 3.5–5.3)
POTASSIUM SERPL-SCNC: 4.1 MMOL/L — SIGNIFICANT CHANGE UP (ref 3.5–5.3)
PROT SERPL-MCNC: 6.4 G/DL — SIGNIFICANT CHANGE UP (ref 6–8.3)
RBC # BLD: 4.77 M/UL — SIGNIFICANT CHANGE UP (ref 4.2–5.8)
RBC # FLD: 12.9 % — SIGNIFICANT CHANGE UP (ref 10.3–14.5)
SODIUM SERPL-SCNC: 138 MMOL/L — SIGNIFICANT CHANGE UP (ref 135–145)
WBC # BLD: 15.31 K/UL — HIGH (ref 3.8–10.5)
WBC # FLD AUTO: 15.31 K/UL — HIGH (ref 3.8–10.5)

## 2023-03-07 PROCEDURE — 71045 X-RAY EXAM CHEST 1 VIEW: CPT | Mod: 26

## 2023-03-07 PROCEDURE — 93306 TTE W/DOPPLER COMPLETE: CPT | Mod: 26

## 2023-03-07 PROCEDURE — 99232 SBSQ HOSP IP/OBS MODERATE 35: CPT | Mod: 25

## 2023-03-07 RX ORDER — FLUTICASONE PROPIONATE 50 MCG
1 SPRAY, SUSPENSION NASAL
Refills: 0 | Status: DISCONTINUED | OUTPATIENT
Start: 2023-03-07 | End: 2023-03-09

## 2023-03-07 RX ORDER — POTASSIUM CHLORIDE 20 MEQ
40 PACKET (EA) ORAL
Refills: 0 | Status: DISCONTINUED | OUTPATIENT
Start: 2023-03-07 | End: 2023-03-07

## 2023-03-07 RX ORDER — ALBUMIN HUMAN 25 %
100 VIAL (ML) INTRAVENOUS ONCE
Refills: 0 | Status: COMPLETED | OUTPATIENT
Start: 2023-03-07 | End: 2023-03-07

## 2023-03-07 RX ADMIN — Medication 50 MILLILITER(S): at 14:17

## 2023-03-07 RX ADMIN — GABAPENTIN 100 MILLIGRAM(S): 400 CAPSULE ORAL at 05:21

## 2023-03-07 RX ADMIN — Medication 650 MILLIGRAM(S): at 22:50

## 2023-03-07 RX ADMIN — ATORVASTATIN CALCIUM 80 MILLIGRAM(S): 80 TABLET, FILM COATED ORAL at 21:33

## 2023-03-07 RX ADMIN — Medication 500 MILLIGRAM(S): at 17:13

## 2023-03-07 RX ADMIN — Medication 25 MILLIGRAM(S): at 17:13

## 2023-03-07 RX ADMIN — Medication 3 UNIT(S): at 07:33

## 2023-03-07 RX ADMIN — PANTOPRAZOLE SODIUM 40 MILLIGRAM(S): 20 TABLET, DELAYED RELEASE ORAL at 05:21

## 2023-03-07 RX ADMIN — HEPARIN SODIUM 5000 UNIT(S): 5000 INJECTION INTRAVENOUS; SUBCUTANEOUS at 21:33

## 2023-03-07 RX ADMIN — Medication 81 MILLIGRAM(S): at 11:32

## 2023-03-07 RX ADMIN — Medication 3 UNIT(S): at 17:13

## 2023-03-07 RX ADMIN — OXYCODONE HYDROCHLORIDE 5 MILLIGRAM(S): 5 TABLET ORAL at 01:15

## 2023-03-07 RX ADMIN — SENNA PLUS 2 TABLET(S): 8.6 TABLET ORAL at 21:33

## 2023-03-07 RX ADMIN — GABAPENTIN 100 MILLIGRAM(S): 400 CAPSULE ORAL at 21:33

## 2023-03-07 RX ADMIN — Medication 3 UNIT(S): at 12:30

## 2023-03-07 RX ADMIN — Medication 500 MILLIGRAM(S): at 05:20

## 2023-03-07 RX ADMIN — GABAPENTIN 100 MILLIGRAM(S): 400 CAPSULE ORAL at 13:42

## 2023-03-07 RX ADMIN — Medication 25 MILLIGRAM(S): at 05:21

## 2023-03-07 RX ADMIN — CHLORHEXIDINE GLUCONATE 1 APPLICATION(S): 213 SOLUTION TOPICAL at 12:27

## 2023-03-07 RX ADMIN — POLYETHYLENE GLYCOL 3350 17 GRAM(S): 17 POWDER, FOR SOLUTION ORAL at 11:32

## 2023-03-07 RX ADMIN — Medication 650 MILLIGRAM(S): at 13:42

## 2023-03-07 RX ADMIN — OXYCODONE HYDROCHLORIDE 5 MILLIGRAM(S): 5 TABLET ORAL at 00:45

## 2023-03-07 RX ADMIN — Medication 650 MILLIGRAM(S): at 14:10

## 2023-03-07 RX ADMIN — Medication 5: at 12:29

## 2023-03-07 RX ADMIN — HEPARIN SODIUM 5000 UNIT(S): 5000 INJECTION INTRAVENOUS; SUBCUTANEOUS at 05:21

## 2023-03-07 RX ADMIN — Medication 650 MILLIGRAM(S): at 21:51

## 2023-03-07 RX ADMIN — HEPARIN SODIUM 5000 UNIT(S): 5000 INJECTION INTRAVENOUS; SUBCUTANEOUS at 13:41

## 2023-03-07 NOTE — PROGRESS NOTE ADULT - SUBJECTIVE AND OBJECTIVE BOX
Spoke with jane at [facility] [place] [facility] to set up transport back to [facility] [place] per ronald arias request and she and family are aware   Subjective  ' hello"    VITAL SIGNS    Telemetry:      Vital Signs Last 24 Hrs  T(C): 36.5 (03-07-23 @ 12:00), Max: 36.8 (03-06-23 @ 20:00)  T(F): 97.7 (03-07-23 @ 12:00), Max: 98.3 (03-06-23 @ 20:00)  HR: 105 (03-07-23 @ 12:00) (66 - 127)  BP: 92/66 (03-07-23 @ 12:00) (92/66 - 113/68)  RR: 26 (03-07-23 @ 12:00) (18 - 35)  SpO2: 95% (03-07-23 @ 12:00) (91% - 97%)             03-06 @ 07:01  -  03-07 @ 07:00  --------------------------------------------------------  IN: 600 mL / OUT: 2400 mL / NET: -1800 mL    03-07 @ 07:01  -  03-07 @ 13:17  --------------------------------------------------------  IN: 240 mL / OUT: 350 mL / NET: -110 mL                          13.1   15.31 )-----------( 184      ( 07 Mar 2023 01:05 )             38.5     03-07    138  |  99  |  27<H>  ----------------------------<  109<H>  4.1   |  27  |  1.13    Ca    9.1      07 Mar 2023 01:05  Phos  3.8     03-07  Mg     2.0     03-07    TPro  6.4  /  Alb  4.1  /  TBili  0.9  /  DBili  x   /  AST  15  /  ALT  16  /  AlkPhos  104  03-07    MEDICATIONS  (STANDING):  ascorbic acid 500 milliGRAM(s) Oral two times a day  aspirin enteric coated 81 milliGRAM(s) Oral daily  atorvastatin 80 milliGRAM(s) Oral at bedtime  bisacodyl Suppository 10 milliGRAM(s) Rectal once  chlorhexidine 2% Cloths 1 Application(s) Topical daily  dextrose 50% Injectable 50 milliLiter(s) IV Push every 15 minutes  gabapentin 100 milliGRAM(s) Oral every 8 hours  heparin   Injectable 5000 Unit(s) SubCutaneous every 8 hours  insulin lispro (ADMELOG) corrective regimen sliding scale   SubCutaneous Before meals and at bedtime  insulin lispro Injectable (ADMELOG) 3 Unit(s) SubCutaneous before breakfast  insulin lispro Injectable (ADMELOG) 3 Unit(s) SubCutaneous before lunch  insulin lispro Injectable (ADMELOG) 3 Unit(s) SubCutaneous before dinner  metoprolol tartrate 25 milliGRAM(s) Oral every 12 hours  pantoprazole    Tablet 40 milliGRAM(s) Oral before breakfast  polyethylene glycol 3350 17 Gram(s) Oral daily  senna 2 Tablet(s) Oral at bedtime  sodium chloride 0.9%. 1000 milliLiter(s) (10 mL/Hr) IV Continuous <Continuous>    MEDICATIONS  (PRN):  acetaminophen     Tablet .. 650 milliGRAM(s) Oral every 6 hours PRN Mild Pain (1 - 3)  oxyCODONE    IR 5 milliGRAM(s) Oral every 4 hours PRN Moderate Pain (4 - 6)  oxyCODONE    IR 10 milliGRAM(s) Oral every 4 hours PRN Severe Pain (7 - 10)  sodium chloride 0.65% Nasal 1 Spray(s) Both Nostrils every 3 hours PRN Nasal Congestion            POCT Blood Glucose.: 152 mg/dL (07 Mar 2023 11:26)  POCT Blood Glucose.: 115 mg/dL (07 Mar 2023 07:25)  POCT Blood Glucose.: 105 mg/dL (06 Mar 2023 21:38)  POCT Blood Glucose.: 105 mg/dL (06 Mar 2023 16:23)          Drains:     MS         [  ] Drainage:                 L Pleural  [  ]  Drainage:                R Pleural  [  ]  Drainage:    Pacing Wires        [  ]   Settings:                                  Isolated  [  ]    Coumadin    [ ] YES          [  ]      NO         Reason:                               PHYSICAL EXAM        Neurology: alert and oriented x 3, nonfocal, no gross deficits    CV :    Sternal Wound :  CDI , Stable    Lungs:    Abdomen: soft, nontender, nondistended, positive bowel sounds, last bowel movement     :               Extremities:                                           Physical Therapy Rec:   Home  [  ]   Home w/ PT  [  ]  Rehab  [  ]    Discussed with Cardiothoracic Team at AM rounds. Subjective  ' hello"    VITAL SIGNS    Telemetry:      Vital Signs Last 24 Hrs  T(C): 36.5 (03-07-23 @ 12:00), Max: 36.8 (03-06-23 @ 20:00)  T(F): 97.7 (03-07-23 @ 12:00), Max: 98.3 (03-06-23 @ 20:00)  HR: 105 (03-07-23 @ 12:00) (66 - 127)  BP: 92/66 (03-07-23 @ 12:00) (92/66 - 113/68)  RR: 26 (03-07-23 @ 12:00) (18 - 35)  SpO2: 95% (03-07-23 @ 12:00) (91% - 97%)             03-06 @ 07:01  -  03-07 @ 07:00  --------------------------------------------------------  IN: 600 mL / OUT: 2400 mL / NET: -1800 mL    03-07 @ 07:01  -  03-07 @ 13:17  --------------------------------------------------------  IN: 240 mL / OUT: 350 mL / NET: -110 mL                          13.1   15.31 )-----------( 184      ( 07 Mar 2023 01:05 )             38.5     03-07    138  |  99  |  27<H>  ----------------------------<  109<H>  4.1   |  27  |  1.13    Ca    9.1      07 Mar 2023 01:05  Phos  3.8     03-07  Mg     2.0     03-07    TPro  6.4  /  Alb  4.1  /  TBili  0.9  /  DBili  x   /  AST  15  /  ALT  16  /  AlkPhos  104  03-07    MEDICATIONS  (STANDING):  ascorbic acid 500 milliGRAM(s) Oral two times a day  aspirin enteric coated 81 milliGRAM(s) Oral daily  atorvastatin 80 milliGRAM(s) Oral at bedtime  bisacodyl Suppository 10 milliGRAM(s) Rectal once  chlorhexidine 2% Cloths 1 Application(s) Topical daily  dextrose 50% Injectable 50 milliLiter(s) IV Push every 15 minutes  gabapentin 100 milliGRAM(s) Oral every 8 hours  heparin   Injectable 5000 Unit(s) SubCutaneous every 8 hours  insulin lispro (ADMELOG) corrective regimen sliding scale   SubCutaneous Before meals and at bedtime  insulin lispro Injectable (ADMELOG) 3 Unit(s) SubCutaneous before breakfast  insulin lispro Injectable (ADMELOG) 3 Unit(s) SubCutaneous before lunch  insulin lispro Injectable (ADMELOG) 3 Unit(s) SubCutaneous before dinner  metoprolol tartrate 25 milliGRAM(s) Oral every 12 hours  pantoprazole    Tablet 40 milliGRAM(s) Oral before breakfast  polyethylene glycol 3350 17 Gram(s) Oral daily  senna 2 Tablet(s) Oral at bedtime  sodium chloride 0.9%. 1000 milliLiter(s) (10 mL/Hr) IV Continuous <Continuous>    MEDICATIONS  (PRN):  acetaminophen     Tablet .. 650 milliGRAM(s) Oral every 6 hours PRN Mild Pain (1 - 3)  oxyCODONE    IR 5 milliGRAM(s) Oral every 4 hours PRN Moderate Pain (4 - 6)  oxyCODONE    IR 10 milliGRAM(s) Oral every 4 hours PRN Severe Pain (7 - 10)  sodium chloride 0.65% Nasal 1 Spray(s) Both Nostrils every 3 hours PRN Nasal Congestion            POCT Blood Glucose.: 152 mg/dL (07 Mar 2023 11:26)  POCT Blood Glucose.: 115 mg/dL (07 Mar 2023 07:25)  POCT Blood Glucose.: 105 mg/dL (06 Mar 2023 21:38)  POCT Blood Glucose.: 105 mg/dL (06 Mar 2023 16:23)              Pacing Wires        [  ]   Settings:     VVI 30 VMA 10                                                  PHYSICAL EXAM        Neurology: alert and oriented x 3, nonfocal, no gross deficits    CV : [id number]    Sternal Wound :  CDI , Stable+ PW EPM VVI 30    Lungs: B/l breaths sounds 5 l NC diminished     Abdomen: soft, nontender, nondistended, positive bowel sounds, last bowel movement 3/7    :   voids         Extremities:  warm well perfused equal strength throughout         B/lle + Dp +  edema                                      Physical Therapy Rec:   Home  [x]   Home w/ PT  [  ]  Rehab  [  ]    Discussed with Cardiothoracic Team at AM rounds.

## 2023-03-07 NOTE — PROGRESS NOTE ADULT - SUBJECTIVE AND OBJECTIVE BOX
Patient seen and examined at the bedside.    Remained critically ill on continuous ICU monitoring.      Brief Summary:  58 yo M s/p AVR on 3/3/2023        24 Hour events:      OBJECTIVE:  Vital Signs Last 24 Hrs  T(C): 36.3 (07 Mar 2023 00:00), Max: 37.1 (06 Mar 2023 12:00)  T(F): 97.3 (07 Mar 2023 00:00), Max: 98.7 (06 Mar 2023 12:00)  HR: 93 (07 Mar 2023 05:00) (66 - 105)  BP: 104/64 (07 Mar 2023 05:00) (95/55 - 113/68)  BP(mean): 79 (07 Mar 2023 05:00) (70 - 85)  RR: 27 (07 Mar 2023 05:00) (18 - 40)  SpO2: 92% (07 Mar 2023 05:00) (92% - 96%)    Parameters below as of 07 Mar 2023 04:00  Patient On (Oxygen Delivery Method): nasal cannula  O2 Flow (L/min): 6                  Physical Exam:   General: Sitting up, interactive.  Neurology: Oriented, following commands   Eyes: Bilateral pupils reactive   Respiratory: Clear bilaterally   CV: Sinus rhythm  Abdominal: Soft, Nontender  Extremities: Warm    -------------------------------------------------------------------------------------------------------------------------------    Labs:                        13.1   15.31 )-----------( 184      ( 07 Mar 2023 01:05 )             38.5     03-07    138  |  99  |  27<H>  ----------------------------<  109<H>  4.1   |  27  |  1.13    Ca    9.1      07 Mar 2023 01:05  Phos  3.8     03-07  Mg     2.0     03-07    TPro  6.4  /  Alb  4.1  /  TBili  0.9  /  DBili  x   /  AST  15  /  ALT  16  /  AlkPhos  104  03-07    LIVER FUNCTIONS - ( 07 Mar 2023 01:05 )  Alb: 4.1 g/dL / Pro: 6.4 g/dL / ALK PHOS: 104 U/L / ALT: 16 U/L / AST: 15 U/L / GGT: x             ABG - ( 06 Mar 2023 11:45 )  pH, Arterial: 7.49  pH, Blood: x     /  pCO2: 38    /  pO2: 102   / HCO3: 29    / Base Excess: 5.4   /  SaO2: 98.2              ------------------------------------------------------------------------------------------------------------------------------  Assessment:  58 yo M s/p AVR on 3/3/23    Hemodynamic instability  Hypovolemia   Leukocytosis  Anemia blood loss       Plan:   ***Neuro***  Postoperative acute pain control with Tylenol, Gabapentin, and Oxycodone  PT/ Ambulate as tolerated    ***Cardiovascular***   ASA/Statin Daily  Lopressor for heart rate control.  Monitor chest tube output.    ***Pulmonary***  Deep breathing and coughing exercises.  Wean oxygen as able.    ***GI***  Tolerating consistent carb diet   Protonix for stress ulcer prophylaxis   Bowel regimen    ***Renal***  Trend creatinine  Replete electrolytes as indicated.    ***ID***  WBC 15.31, continue to trend leukocytosis      ***Endocrine***  Stress Hyperglycemia  Insulin sliding scale.    ***Hematology***  Acute blood loss anemia - no current transfusion indication   DVT prophylaxis with SQ Heparin           Patient requires continuous monitoring with bedside rhythm monitoring, pulse oximetry monitoring, and continuous central venous and arterial pressure monitoring; and intermittent blood gas analysis. Care plan discussed with the ICU care team.   Patient remained critical, at risk for life threatening decompensation.    I have spent 30 minutes providing critical care management to this patient.    By signing my name below, I, Wali Villatoro, attest that this documentation has been prepared under the direction and in the presence of Kate Terrazas MD  Electronically signed: Wali Dentpal, 03-07-23 @ 06:13    I, Kate Terrazas MD, personally performed the services described in this documentation. all medical record entries made by the scribe were at my direction and in my presence. I have reviewed the chart and agree that the record reflects my personal performance and is accurate and complete  Electronically signed: Kate Terrazas MD Patient seen and examined at the bedside.        Brief Summary:  56 yo M s/p AVR on 3/3/2023      24 Hour events:  No acute events overnight.  He reports that pain is controlled  He denies shortness of breath, dizziness, nausea, emesis.    Objective:  Vital Signs Last 24 Hrs  T(C): 36.3 (07 Mar 2023 00:00), Max: 37.1 (06 Mar 2023 12:00)  T(F): 97.3 (07 Mar 2023 00:00), Max: 98.7 (06 Mar 2023 12:00)  HR: 93 (07 Mar 2023 05:00) (66 - 105)  BP: 104/64 (07 Mar 2023 05:00) (95/55 - 113/68)  BP(mean): 79 (07 Mar 2023 05:00) (70 - 85)  RR: 27 (07 Mar 2023 05:00) (18 - 40)  SpO2: 92% (07 Mar 2023 05:00) (92% - 96%)    Parameters below as of 07 Mar 2023 04:00  Patient On (Oxygen Delivery Method): nasal cannula  O2 Flow (L/min): 6                  Physical Exam:   General: Sitting up, interactive.  Neurology: Oriented, following commands   Eyes: Bilateral pupils reactive   Respiratory: Clear bilaterally   CV: Sinus rhythm  Abdominal: Soft, Nontender  Extremities: Warm    -------------------------------------------------------------------------------------------------------------------------------    Labs:                        13.1   15.31 )-----------( 184      ( 07 Mar 2023 01:05 )             38.5     03-07    138  |  99  |  27<H>  ----------------------------<  109<H>  4.1   |  27  |  1.13    Ca    9.1      07 Mar 2023 01:05  Phos  3.8     03-07  Mg     2.0     03-07    TPro  6.4  /  Alb  4.1  /  TBili  0.9  /  DBili  x   /  AST  15  /  ALT  16  /  AlkPhos  104  03-07    LIVER FUNCTIONS - ( 07 Mar 2023 01:05 )  Alb: 4.1 g/dL / Pro: 6.4 g/dL / ALK PHOS: 104 U/L / ALT: 16 U/L / AST: 15 U/L / GGT: x             ABG - ( 06 Mar 2023 11:45 )  pH, Arterial: 7.49  pH, Blood: x     /  pCO2: 38    /  pO2: 102   / HCO3: 29    / Base Excess: 5.4   /  SaO2: 98.2          ------------------------------------------------------------------------------------------------------------------------------  Assessment:  56 yo M s/p AVR on 3/3/23    At risk for hemodynamic instability  Postop acute respiratory insufficiency      Plan:   ***Neuro***  Postoperative acute pain control with Tylenol, Gabapentin, and Oxycodone  PT/ Ambulate as tolerated    ***Cardiovascular***   ASA/Statin Daily  Lopressor for heart rate control.  Monitor chest tube output.    ***Pulmonary***  Deep breathing and coughing exercises.  Wean oxygen as able.    ***GI***  Tolerating consistent carb diet   Protonix for stress ulcer prophylaxis   Bowel regimen    ***Renal***  Trend creatinine  Replete electrolytes as indicated.    ***ID***  Trend leukocytosis    ***Endocrine***  Stress Hyperglycemia  Insulin sliding scale.    ***Hematology***  Acute blood loss anemia - no current transfusion indication   DVT prophylaxis with SQ Heparin           Care plan discussed with the ICU care team.         By signing my name below, I, Wali Villatoro, attest that this documentation has been prepared under the direction and in the presence of Kate Terrazas MD  Electronically signed: Wali Villatoro, 03-07-23 @ 06:13    I, Kate Terrazas MD, personally performed the services described in this documentation. all medical record entries made by the scribe were at my direction and in my presence. I have reviewed the chart and agree that the record reflects my personal performance and is accurate and complete  Electronically signed: Kate Terrazas MD

## 2023-03-07 NOTE — PROGRESS NOTE ADULT - ASSESSMENT
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 His medical history significant for HTN, GERD, hyperlipidemia, bicuspid aortic valve, deviated nasal septum, lower back pain. He denies any chest pain, no SOB.  covid-19 PCR test on 3/1/2023.  (24 Feb 2023 11:03)    Patient underwent AVR 3/3. He has had post operative hypoxia.    Patient seen and examined at the bedside.    Remained critically ill on continuous ICU monitoring. 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 His medical history significant for HTN, GERD, hyperlipidemia, bicuspid aortic valve, deviated nasal septum, lower back pain. He denies any chest pain, no SOB.  covid-19 PCR test on 3/1/2023.  (24 Feb 2023 11:03)    Patient underwent AVR 3/3. recovered in   CTU  post operative hypoxia.    3/4 VSS , +HF NC, + +nesbitt, +2 chest tubes, +ext pacer, A&Ox4, agreeable to PT  Disposition to home  3/5 VSS hypoxia vivien placed    3/7- VSS  all invasive lines removed + EPM   ERP protocol. transferred to  [facility]. 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 His medical history significant for HTN, GERD, hyperlipidemia, bicuspid aortic valve, deviated nasal septum, lower back pain. He denies any chest pain, no SOB.  covid-19 PCR test on 3/1/2023.  (24 Feb 2023 11:03)    Patient underwent AVR 3/3. recovered in   CTU  post operative hypoxia.    3/4 VSS , +HF NC, + +nesbitt, +2 chest tubes, +ext pacer, A&Ox4, agreeable to PT  Disposition to home  3/5 VSS hypoxia vivien placed   3/6 VSS Hypoxia HFNC    3/7- VSS  all invasive lines removed + EPM   ERP protocol. transferred to [address] [place] [address]. ON 5 LNC wean oxygen soft BP-  albumin 100cc.

## 2023-03-07 NOTE — PROGRESS NOTE ADULT - PROBLEM SELECTOR PLAN 1
TELE  ASA 81 Lopressor 25 BID PPI  Atorvastatin   ERP  OXY for pain  incentive spirometry ambulate TID  wean oxygen TELE  + EPM  ASA 81 Lopressor 25 BID PPI  Atorvastatin   ERP  OXY for pain  incentive spirometry ambulate TID  wean oxygen  dispotion to home TELE  + EPM  ASA 81 Lopressor 25 BID PPI  Atorvastatin   ERP  OXY for pain  incentive spirometry ambulate TID  wean oxygen  disporting to home

## 2023-03-08 LAB
ALBUMIN SERPL ELPH-MCNC: 3.9 G/DL — SIGNIFICANT CHANGE UP (ref 3.3–5)
ALP SERPL-CCNC: 131 U/L — HIGH (ref 40–120)
ALT FLD-CCNC: 20 U/L — SIGNIFICANT CHANGE UP (ref 10–45)
ANION GAP SERPL CALC-SCNC: 12 MMOL/L — SIGNIFICANT CHANGE UP (ref 5–17)
AST SERPL-CCNC: 22 U/L — SIGNIFICANT CHANGE UP (ref 10–40)
BILIRUB SERPL-MCNC: 0.9 MG/DL — SIGNIFICANT CHANGE UP (ref 0.2–1.2)
BUN SERPL-MCNC: 28 MG/DL — HIGH (ref 7–23)
CALCIUM SERPL-MCNC: 9.3 MG/DL — SIGNIFICANT CHANGE UP (ref 8.4–10.5)
CHLORIDE SERPL-SCNC: 100 MMOL/L — SIGNIFICANT CHANGE UP (ref 96–108)
CO2 SERPL-SCNC: 24 MMOL/L — SIGNIFICANT CHANGE UP (ref 22–31)
CREAT SERPL-MCNC: 1.11 MG/DL — SIGNIFICANT CHANGE UP (ref 0.5–1.3)
EGFR: 77 ML/MIN/1.73M2 — SIGNIFICANT CHANGE UP
GLUCOSE BLDC GLUCOMTR-MCNC: 101 MG/DL — HIGH (ref 70–99)
GLUCOSE BLDC GLUCOMTR-MCNC: 102 MG/DL — HIGH (ref 70–99)
GLUCOSE BLDC GLUCOMTR-MCNC: 120 MG/DL — HIGH (ref 70–99)
GLUCOSE BLDC GLUCOMTR-MCNC: 97 MG/DL — SIGNIFICANT CHANGE UP (ref 70–99)
GLUCOSE SERPL-MCNC: 106 MG/DL — HIGH (ref 70–99)
HCT VFR BLD CALC: 36.6 % — LOW (ref 39–50)
HGB BLD-MCNC: 12.3 G/DL — LOW (ref 13–17)
MCHC RBC-ENTMCNC: 27.2 PG — SIGNIFICANT CHANGE UP (ref 27–34)
MCHC RBC-ENTMCNC: 33.6 GM/DL — SIGNIFICANT CHANGE UP (ref 32–36)
MCV RBC AUTO: 81 FL — SIGNIFICANT CHANGE UP (ref 80–100)
NRBC # BLD: 0 /100 WBCS — SIGNIFICANT CHANGE UP (ref 0–0)
PLATELET # BLD AUTO: 212 K/UL — SIGNIFICANT CHANGE UP (ref 150–400)
POTASSIUM SERPL-MCNC: 3.8 MMOL/L — SIGNIFICANT CHANGE UP (ref 3.5–5.3)
POTASSIUM SERPL-SCNC: 3.8 MMOL/L — SIGNIFICANT CHANGE UP (ref 3.5–5.3)
PROT SERPL-MCNC: 6.2 G/DL — SIGNIFICANT CHANGE UP (ref 6–8.3)
RBC # BLD: 4.52 M/UL — SIGNIFICANT CHANGE UP (ref 4.2–5.8)
RBC # FLD: 12.8 % — SIGNIFICANT CHANGE UP (ref 10.3–14.5)
SODIUM SERPL-SCNC: 136 MMOL/L — SIGNIFICANT CHANGE UP (ref 135–145)
WBC # BLD: 12.84 K/UL — HIGH (ref 3.8–10.5)
WBC # FLD AUTO: 12.84 K/UL — HIGH (ref 3.8–10.5)

## 2023-03-08 PROCEDURE — 71045 X-RAY EXAM CHEST 1 VIEW: CPT | Mod: 26

## 2023-03-08 RX ORDER — POTASSIUM BICARBONATE 978 MG/1
25 TABLET, EFFERVESCENT ORAL
Refills: 0 | Status: COMPLETED | OUTPATIENT
Start: 2023-03-08 | End: 2023-03-08

## 2023-03-08 RX ADMIN — Medication 1 SPRAY(S): at 17:39

## 2023-03-08 RX ADMIN — Medication 3 UNIT(S): at 07:44

## 2023-03-08 RX ADMIN — ATORVASTATIN CALCIUM 80 MILLIGRAM(S): 80 TABLET, FILM COATED ORAL at 21:52

## 2023-03-08 RX ADMIN — GABAPENTIN 100 MILLIGRAM(S): 400 CAPSULE ORAL at 05:34

## 2023-03-08 RX ADMIN — Medication 650 MILLIGRAM(S): at 11:37

## 2023-03-08 RX ADMIN — POLYETHYLENE GLYCOL 3350 17 GRAM(S): 17 POWDER, FOR SOLUTION ORAL at 11:37

## 2023-03-08 RX ADMIN — HEPARIN SODIUM 5000 UNIT(S): 5000 INJECTION INTRAVENOUS; SUBCUTANEOUS at 14:01

## 2023-03-08 RX ADMIN — SENNA PLUS 2 TABLET(S): 8.6 TABLET ORAL at 21:52

## 2023-03-08 RX ADMIN — HEPARIN SODIUM 5000 UNIT(S): 5000 INJECTION INTRAVENOUS; SUBCUTANEOUS at 21:52

## 2023-03-08 RX ADMIN — Medication 650 MILLIGRAM(S): at 17:40

## 2023-03-08 RX ADMIN — PANTOPRAZOLE SODIUM 40 MILLIGRAM(S): 20 TABLET, DELAYED RELEASE ORAL at 05:35

## 2023-03-08 RX ADMIN — POTASSIUM BICARBONATE 25 MILLIEQUIVALENT(S): 978 TABLET, EFFERVESCENT ORAL at 10:28

## 2023-03-08 RX ADMIN — Medication 650 MILLIGRAM(S): at 18:10

## 2023-03-08 RX ADMIN — Medication 81 MILLIGRAM(S): at 11:39

## 2023-03-08 RX ADMIN — Medication 25 MILLIGRAM(S): at 17:40

## 2023-03-08 RX ADMIN — CHLORHEXIDINE GLUCONATE 1 APPLICATION(S): 213 SOLUTION TOPICAL at 11:42

## 2023-03-08 RX ADMIN — POTASSIUM BICARBONATE 25 MILLIEQUIVALENT(S): 978 TABLET, EFFERVESCENT ORAL at 11:39

## 2023-03-08 RX ADMIN — HEPARIN SODIUM 5000 UNIT(S): 5000 INJECTION INTRAVENOUS; SUBCUTANEOUS at 05:34

## 2023-03-08 RX ADMIN — Medication 650 MILLIGRAM(S): at 12:00

## 2023-03-08 RX ADMIN — Medication 25 MILLIGRAM(S): at 05:34

## 2023-03-08 RX ADMIN — Medication 1 SPRAY(S): at 05:04

## 2023-03-08 RX ADMIN — Medication 500 MILLIGRAM(S): at 05:34

## 2023-03-08 RX ADMIN — Medication 3 UNIT(S): at 11:38

## 2023-03-08 NOTE — OCCUPATIONAL THERAPY INITIAL EVALUATION ADULT - ADDITIONAL COMMENTS
Pt lives in a PH w/ spouse; 3 AYESHA and 1 level inside; owns a walk in shower, no AE/DME. PTA pt was independent with all ADLs +driving

## 2023-03-08 NOTE — PROGRESS NOTE ADULT - PROBLEM SELECTOR PLAN 1
TELE  + EPM  ASA 81 Lopressor 25 BID PPI  Atorvastatin   ERP  OXY for pain  incentive spirometry ambulate TID  wean oxygen  disporting to home

## 2023-03-08 NOTE — OCCUPATIONAL THERAPY INITIAL EVALUATION ADULT - MD ORDER
OT Evaluate and Treat   Activity: Increase as tolerated  Activity: Ambulate with assistance   Out of bed to chair / with assistance

## 2023-03-08 NOTE — PROGRESS NOTE ADULT - ASSESSMENT
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 His medical history significant for HTN, GERD, hyperlipidemia, bicuspid aortic valve, deviated nasal septum, lower back pain. He denies any chest pain, no SOB.  covid-19 PCR test on 3/1/2023.  (24 Feb 2023 11:03)    Patient underwent AVR 3/3. recovered in   CTU  post operative hypoxia.    3/4 VSS , +HF NC, + +nesbitt, +2 chest tubes, +ext pacer, A&Ox4, agreeable to PT  Disposition to home  3/5 VSS hypoxia vivien placed   3/6 VSS Hypoxia HFNC    3/7- VSS  all invasive lines removed + EPM   ERP protocol. transferred to [address] [place] [address]. ON 5 LNC wean oxygen soft BP-  albumin 100cc.  3/8 Post op hypoxia, wean o2 as tolerated.   Hypotension  yesteday  off diuretics.  Chest PT and pain management  D/c planning  < from: TTE with Doppler (w/Cont) (03.07.23 @ 18:02) >  Technically difficult study.  1. Mild mitral annular calcification, otherwise normal  mitral valve. Minimal mitral regurgitation.  2. Bioprosthetic aortic valve replacement not well  visualized. Peak transaortic valve gradient equals 15 mm  Hg, mean transaortic valve gradient equals 9 mm Hg, which  is probably normal in the presence of a bioprosthetic  aortic valve. No aortic valve regurgitation seen.  3. Endocardial visualization enhanced with intravenous  injection of Ultrasonic Enhancing Agent (Lumason). Left  ventricle suboptimally visualized despite intravenous  ultrasonic enhancing agent; grossly preserved left  ventricular systolic function. Septal motion consistent  with prior cardiac surgery. No left ventricular thrombus.  4. The right ventricle is not well visualized; grossly  preserved right ventricular systolic function.  5. No pericardial effusion seen.  *** No previous Echo exam.    < end of copied text >

## 2023-03-08 NOTE — PROGRESS NOTE ADULT - SUBJECTIVE AND OBJECTIVE BOX
VITAL SIGNS    Telemetry:  nsr     Vital Signs Last 24 Hrs  T(C): 36.6 (23 @ 11:25), Max: 36.8 (23 @ 05:00)  T(F): 97.9 (23 @ 11:25), Max: 98.2 (23 @ 05:00)  HR: 79 (23 @ 11:25) (76 - 113)  BP: 104/70 (23 @ 11:25) (92/66 - 111/68)  RR: 18 (23 @ 11:25) (18 - 26)  SpO2: 95% (23 @ 11:25) (95% - 98%)                    @ 07:01  -   @ 07:00  --------------------------------------------------------  IN: 640 mL / OUT: 1150 mL / NET: -510 mL     @ 07:01  -   @ 11:42  --------------------------------------------------------  IN: 240 mL / OUT: 200 mL / NET: 40 mL          Daily     Daily Weight in k.2 (08 Mar 2023 07:22)            CAPILLARY BLOOD GLUCOSE      POCT Blood Glucose.: 120 mg/dL (08 Mar 2023 11:24)  POCT Blood Glucose.: 102 mg/dL (08 Mar 2023 07:33)  POCT Blood Glucose.: 113 mg/dL (07 Mar 2023 21:29)  POCT Blood Glucose.: 115 mg/dL (07 Mar 2023 16:52)                Pacing Wires        [  ]   Settings:                                  Isolated  [ x ]    Coumadin    [ ] YES          [x  ]      NO                                   PHYSICAL EXAM        Neurology: alert and oriented x 3, nonfocal, no gross deficits  CV : s1 s2 RRR  Sternal Wound :  CDI , Stable  Lungs: cta  Abdomen: soft, nontender, nondistended, positive bowel sounds, last bowel movement                       :    voiding   Extremities:    -  edema   /  -   calve tenderness ,             acetaminophen     Tablet .. 650 milliGRAM(s) Oral every 6 hours PRN  aspirin enteric coated 81 milliGRAM(s) Oral daily  atorvastatin 80 milliGRAM(s) Oral at bedtime  bisacodyl Suppository 10 milliGRAM(s) Rectal once  chlorhexidine 2% Cloths 1 Application(s) Topical daily  dextrose 50% Injectable 50 milliLiter(s) IV Push every 15 minutes  fluticasone propionate 50 MICROgram(s)/spray Nasal Spray 1 Spray(s) Both Nostrils two times a day  heparin   Injectable 5000 Unit(s) SubCutaneous every 8 hours  insulin lispro (ADMELOG) corrective regimen sliding scale   SubCutaneous Before meals and at bedtime  insulin lispro Injectable (ADMELOG) 3 Unit(s) SubCutaneous before breakfast  insulin lispro Injectable (ADMELOG) 3 Unit(s) SubCutaneous before lunch  insulin lispro Injectable (ADMELOG) 3 Unit(s) SubCutaneous before dinner  metoprolol tartrate 25 milliGRAM(s) Oral every 12 hours  oxyCODONE    IR 5 milliGRAM(s) Oral every 4 hours PRN  oxyCODONE    IR 10 milliGRAM(s) Oral every 4 hours PRN  pantoprazole    Tablet 40 milliGRAM(s) Oral before breakfast  polyethylene glycol 3350 17 Gram(s) Oral daily  senna 2 Tablet(s) Oral at bedtime  sodium chloride 0.65% Nasal 1 Spray(s) Both Nostrils every 3 hours PRN  sodium chloride 0.9%. 1000 milliLiter(s) IV Continuous <Continuous>                    Physical Therapy Rec:   Home  [  ]   Home w/ PT  [  ]  Rehab  [  ]  Discussed with Cardiothoracic Team at AM rounds.

## 2023-03-09 ENCOUNTER — TRANSCRIPTION ENCOUNTER (OUTPATIENT)
Age: 58
End: 2023-03-09

## 2023-03-09 VITALS
SYSTOLIC BLOOD PRESSURE: 105 MMHG | HEART RATE: 70 BPM | RESPIRATION RATE: 18 BRPM | DIASTOLIC BLOOD PRESSURE: 69 MMHG | TEMPERATURE: 98 F | OXYGEN SATURATION: 93 %

## 2023-03-09 LAB
ALBUMIN SERPL ELPH-MCNC: 3.9 G/DL — SIGNIFICANT CHANGE UP (ref 3.3–5)
ALP SERPL-CCNC: 165 U/L — HIGH (ref 40–120)
ALT FLD-CCNC: 26 U/L — SIGNIFICANT CHANGE UP (ref 10–45)
ANION GAP SERPL CALC-SCNC: 12 MMOL/L — SIGNIFICANT CHANGE UP (ref 5–17)
AST SERPL-CCNC: 27 U/L — SIGNIFICANT CHANGE UP (ref 10–40)
BASOPHILS # BLD AUTO: 0.05 K/UL — SIGNIFICANT CHANGE UP (ref 0–0.2)
BASOPHILS NFR BLD AUTO: 0.3 % — SIGNIFICANT CHANGE UP (ref 0–2)
BILIRUB SERPL-MCNC: 0.8 MG/DL — SIGNIFICANT CHANGE UP (ref 0.2–1.2)
BUN SERPL-MCNC: 26 MG/DL — HIGH (ref 7–23)
CALCIUM SERPL-MCNC: 9.1 MG/DL — SIGNIFICANT CHANGE UP (ref 8.4–10.5)
CHLORIDE SERPL-SCNC: 101 MMOL/L — SIGNIFICANT CHANGE UP (ref 96–108)
CO2 SERPL-SCNC: 25 MMOL/L — SIGNIFICANT CHANGE UP (ref 22–31)
CREAT SERPL-MCNC: 1.15 MG/DL — SIGNIFICANT CHANGE UP (ref 0.5–1.3)
EGFR: 74 ML/MIN/1.73M2 — SIGNIFICANT CHANGE UP
EOSINOPHIL # BLD AUTO: 0.58 K/UL — HIGH (ref 0–0.5)
EOSINOPHIL NFR BLD AUTO: 3.9 % — SIGNIFICANT CHANGE UP (ref 0–6)
GLUCOSE BLDC GLUCOMTR-MCNC: 106 MG/DL — HIGH (ref 70–99)
GLUCOSE BLDC GLUCOMTR-MCNC: 106 MG/DL — HIGH (ref 70–99)
GLUCOSE SERPL-MCNC: 109 MG/DL — HIGH (ref 70–99)
HCT VFR BLD CALC: 36.1 % — LOW (ref 39–50)
HGB BLD-MCNC: 12.3 G/DL — LOW (ref 13–17)
IMM GRANULOCYTES NFR BLD AUTO: 1.6 % — HIGH (ref 0–0.9)
LYMPHOCYTES # BLD AUTO: 1.97 K/UL — SIGNIFICANT CHANGE UP (ref 1–3.3)
LYMPHOCYTES # BLD AUTO: 13.2 % — SIGNIFICANT CHANGE UP (ref 13–44)
MCHC RBC-ENTMCNC: 27.2 PG — SIGNIFICANT CHANGE UP (ref 27–34)
MCHC RBC-ENTMCNC: 34.1 GM/DL — SIGNIFICANT CHANGE UP (ref 32–36)
MCV RBC AUTO: 79.9 FL — LOW (ref 80–100)
MONOCYTES # BLD AUTO: 1.22 K/UL — HIGH (ref 0–0.9)
MONOCYTES NFR BLD AUTO: 8.2 % — SIGNIFICANT CHANGE UP (ref 2–14)
NEUTROPHILS # BLD AUTO: 10.85 K/UL — HIGH (ref 1.8–7.4)
NEUTROPHILS NFR BLD AUTO: 72.8 % — SIGNIFICANT CHANGE UP (ref 43–77)
NRBC # BLD: 0 /100 WBCS — SIGNIFICANT CHANGE UP (ref 0–0)
PLATELET # BLD AUTO: 228 K/UL — SIGNIFICANT CHANGE UP (ref 150–400)
POTASSIUM SERPL-MCNC: 3.8 MMOL/L — SIGNIFICANT CHANGE UP (ref 3.5–5.3)
POTASSIUM SERPL-SCNC: 3.8 MMOL/L — SIGNIFICANT CHANGE UP (ref 3.5–5.3)
PROT SERPL-MCNC: 6.1 G/DL — SIGNIFICANT CHANGE UP (ref 6–8.3)
RBC # BLD: 4.52 M/UL — SIGNIFICANT CHANGE UP (ref 4.2–5.8)
RBC # FLD: 12.9 % — SIGNIFICANT CHANGE UP (ref 10.3–14.5)
SODIUM SERPL-SCNC: 138 MMOL/L — SIGNIFICANT CHANGE UP (ref 135–145)
WBC # BLD: 14.91 K/UL — HIGH (ref 3.8–10.5)
WBC # FLD AUTO: 14.91 K/UL — HIGH (ref 3.8–10.5)

## 2023-03-09 PROCEDURE — P9047: CPT

## 2023-03-09 PROCEDURE — 82553 CREATINE MB FRACTION: CPT

## 2023-03-09 PROCEDURE — 93306 TTE W/DOPPLER COMPLETE: CPT

## 2023-03-09 PROCEDURE — 85027 COMPLETE CBC AUTOMATED: CPT

## 2023-03-09 PROCEDURE — 83735 ASSAY OF MAGNESIUM: CPT

## 2023-03-09 PROCEDURE — 85730 THROMBOPLASTIN TIME PARTIAL: CPT

## 2023-03-09 PROCEDURE — 93005 ELECTROCARDIOGRAM TRACING: CPT

## 2023-03-09 PROCEDURE — 82947 ASSAY GLUCOSE BLOOD QUANT: CPT

## 2023-03-09 PROCEDURE — 85610 PROTHROMBIN TIME: CPT

## 2023-03-09 PROCEDURE — 36415 COLL VENOUS BLD VENIPUNCTURE: CPT

## 2023-03-09 PROCEDURE — 84443 ASSAY THYROID STIM HORMONE: CPT

## 2023-03-09 PROCEDURE — 86900 BLOOD TYPING SEROLOGIC ABO: CPT

## 2023-03-09 PROCEDURE — 94640 AIRWAY INHALATION TREATMENT: CPT

## 2023-03-09 PROCEDURE — 88305 TISSUE EXAM BY PATHOLOGIST: CPT

## 2023-03-09 PROCEDURE — 71046 X-RAY EXAM CHEST 2 VIEWS: CPT

## 2023-03-09 PROCEDURE — 82565 ASSAY OF CREATININE: CPT

## 2023-03-09 PROCEDURE — C1751: CPT

## 2023-03-09 PROCEDURE — 97162 PT EVAL MOD COMPLEX 30 MIN: CPT

## 2023-03-09 PROCEDURE — 82803 BLOOD GASES ANY COMBINATION: CPT

## 2023-03-09 PROCEDURE — 82962 GLUCOSE BLOOD TEST: CPT

## 2023-03-09 PROCEDURE — C1889: CPT

## 2023-03-09 PROCEDURE — 71046 X-RAY EXAM CHEST 2 VIEWS: CPT | Mod: 26

## 2023-03-09 PROCEDURE — 85014 HEMATOCRIT: CPT

## 2023-03-09 PROCEDURE — 71045 X-RAY EXAM CHEST 1 VIEW: CPT

## 2023-03-09 PROCEDURE — 86850 RBC ANTIBODY SCREEN: CPT

## 2023-03-09 PROCEDURE — 85384 FIBRINOGEN ACTIVITY: CPT

## 2023-03-09 PROCEDURE — 82435 ASSAY OF BLOOD CHLORIDE: CPT

## 2023-03-09 PROCEDURE — 86901 BLOOD TYPING SEROLOGIC RH(D): CPT

## 2023-03-09 PROCEDURE — 94002 VENT MGMT INPAT INIT DAY: CPT

## 2023-03-09 PROCEDURE — C1769: CPT

## 2023-03-09 PROCEDURE — 85018 HEMOGLOBIN: CPT

## 2023-03-09 PROCEDURE — P9045: CPT

## 2023-03-09 PROCEDURE — 86891 AUTOLOGOUS BLOOD OP SALVAGE: CPT

## 2023-03-09 PROCEDURE — 97116 GAIT TRAINING THERAPY: CPT

## 2023-03-09 PROCEDURE — 84295 ASSAY OF SERUM SODIUM: CPT

## 2023-03-09 PROCEDURE — 84132 ASSAY OF SERUM POTASSIUM: CPT

## 2023-03-09 PROCEDURE — 86923 COMPATIBILITY TEST ELECTRIC: CPT

## 2023-03-09 PROCEDURE — 97530 THERAPEUTIC ACTIVITIES: CPT

## 2023-03-09 PROCEDURE — 97165 OT EVAL LOW COMPLEX 30 MIN: CPT

## 2023-03-09 PROCEDURE — 80053 COMPREHEN METABOLIC PANEL: CPT

## 2023-03-09 PROCEDURE — 81001 URINALYSIS AUTO W/SCOPE: CPT

## 2023-03-09 PROCEDURE — 84100 ASSAY OF PHOSPHORUS: CPT

## 2023-03-09 PROCEDURE — 84484 ASSAY OF TROPONIN QUANT: CPT

## 2023-03-09 PROCEDURE — 82330 ASSAY OF CALCIUM: CPT

## 2023-03-09 PROCEDURE — 85025 COMPLETE CBC W/AUTO DIFF WBC: CPT

## 2023-03-09 PROCEDURE — 82550 ASSAY OF CK (CPK): CPT

## 2023-03-09 PROCEDURE — 83605 ASSAY OF LACTIC ACID: CPT

## 2023-03-09 PROCEDURE — 85520 HEPARIN ASSAY: CPT

## 2023-03-09 RX ORDER — LANSOPRAZOLE 30 MG/1
30 CAPSULE, DELAYED RELEASE ORAL
Refills: 0 | Status: DISCONTINUED | COMMUNITY
End: 2023-03-09

## 2023-03-09 RX ORDER — ASPIRIN/CALCIUM CARB/MAGNESIUM 324 MG
1 TABLET ORAL
Qty: 30 | Refills: 0
Start: 2023-03-09 | End: 2023-04-07

## 2023-03-09 RX ORDER — OXYCODONE HYDROCHLORIDE 5 MG/1
1 TABLET ORAL
Qty: 12 | Refills: 0
Start: 2023-03-09 | End: 2023-03-11

## 2023-03-09 RX ORDER — METOPROLOL TARTRATE 50 MG
1 TABLET ORAL
Qty: 60 | Refills: 0
Start: 2023-03-09 | End: 2023-04-07

## 2023-03-09 RX ORDER — PANTOPRAZOLE SODIUM 20 MG/1
1 TABLET, DELAYED RELEASE ORAL
Qty: 15 | Refills: 0
Start: 2023-03-09 | End: 2023-03-23

## 2023-03-09 RX ORDER — ASPIRIN ENTERIC COATED TABLETS 81 MG 81 MG/1
81 TABLET, DELAYED RELEASE ORAL DAILY
Refills: 0 | Status: ACTIVE | COMMUNITY
Start: 2023-03-09

## 2023-03-09 RX ORDER — AMLODIPINE BESYLATE 2.5 MG/1
1 TABLET ORAL
Qty: 0 | Refills: 0 | DISCHARGE

## 2023-03-09 RX ORDER — SENNA PLUS 8.6 MG/1
2 TABLET ORAL
Qty: 10 | Refills: 0
Start: 2023-03-09 | End: 2023-03-13

## 2023-03-09 RX ORDER — AMLODIPINE BESYLATE 10 MG/1
10 TABLET ORAL DAILY
Qty: 90 | Refills: 3 | Status: DISCONTINUED | COMMUNITY
End: 2023-03-09

## 2023-03-09 RX ORDER — LANSOPRAZOLE 15 MG/1
1 CAPSULE, DELAYED RELEASE ORAL
Qty: 0 | Refills: 0 | DISCHARGE

## 2023-03-09 RX ORDER — POTASSIUM CHLORIDE 20 MEQ
20 PACKET (EA) ORAL ONCE
Refills: 0 | Status: COMPLETED | OUTPATIENT
Start: 2023-03-09 | End: 2023-03-09

## 2023-03-09 RX ADMIN — Medication 3 UNIT(S): at 11:48

## 2023-03-09 RX ADMIN — Medication 25 MILLIGRAM(S): at 05:33

## 2023-03-09 RX ADMIN — CHLORHEXIDINE GLUCONATE 1 APPLICATION(S): 213 SOLUTION TOPICAL at 13:10

## 2023-03-09 RX ADMIN — HEPARIN SODIUM 5000 UNIT(S): 5000 INJECTION INTRAVENOUS; SUBCUTANEOUS at 13:11

## 2023-03-09 RX ADMIN — Medication 81 MILLIGRAM(S): at 13:10

## 2023-03-09 RX ADMIN — HEPARIN SODIUM 5000 UNIT(S): 5000 INJECTION INTRAVENOUS; SUBCUTANEOUS at 05:34

## 2023-03-09 RX ADMIN — Medication 20 MILLIEQUIVALENT(S): at 08:05

## 2023-03-09 RX ADMIN — Medication 1 SPRAY(S): at 05:34

## 2023-03-09 RX ADMIN — PANTOPRAZOLE SODIUM 40 MILLIGRAM(S): 20 TABLET, DELAYED RELEASE ORAL at 05:33

## 2023-03-09 RX ADMIN — Medication 3 UNIT(S): at 08:05

## 2023-03-09 NOTE — DISCHARGE NOTE NURSING/CASE MANAGEMENT/SOCIAL WORK - PATIENT PORTAL LINK FT
You can access the FollowMyHealth Patient Portal offered by [facility] by registering at the following website: [email or web address]. By joining [facility]’s FollowMyHealth portal, you will also be able to view your health information using other applications (apps) compatible with our system.

## 2023-03-09 NOTE — DISCHARGE NOTE NURSING/CASE MANAGEMENT/SOCIAL WORK - NSDCPEFALRISK_GEN_ALL_CORE
For information on Fall & Injury Prevention, visit: [email or web address] OR  [email or web address] OR  https://www.cdc.gov/steadi/patient.html

## 2023-03-09 NOTE — DISCHARGE NOTE PROVIDER - NSDCMRMEDTOKEN_GEN_ALL_CORE_FT
aspirin 81 mg oral delayed release tablet: 1 tab(s) orally once a day  atorvastatin 40 mg oral tablet: 1 tab(s) orally once a day, night   metoprolol tartrate 25 mg oral tablet: 1 tab(s) orally every 12 hours  oxyCODONE 5 mg oral tablet: 1 tab(s) orally every 6 hours, As Needed -Moderate Pain (4 - 6) MDD:4  pantoprazole 40 mg oral delayed release tablet: 1 tab(s) orally once a day (before a meal)  senna leaf extract oral tablet: 2 tab(s) orally once a day (at bedtime)    AS Needed for Constipation  Therapeutic Multiple Vitamins oral tablet: 1 tab(s) orally once a day/ LD on 01/18/23

## 2023-03-09 NOTE — DISCHARGE NOTE PROVIDER - NSDCCPCAREPLAN_GEN_ALL_CORE_FT
PRINCIPAL DISCHARGE DIAGNOSIS  Diagnosis: S/P AVR (aortic valve replacement)  Assessment and Plan of Treatment: 1. Daily Shower  2. Weight yourself daily and notify any weight gain greater than 2-3 pounds in 24 hours.  3. Regular diet - low fat, low cholesterol, no added salt.  4. Cleanse Midsternal incision and leg incision daily while showering with warm water and mild soap, pat dry and maintain open to air.   5. Follow Cardiac Surgery Do's and Don'ts discharge instructions.   6. No driving until cleared by MD.   7. No heavy lifting nothing greater than 5 pounds until cleared by MD.   8. Call / Notify MD any fever greater than 101.0  9. Increase Activity as tolerated.   10. Follow up with Dr. Juan at [facility] on

## 2023-03-09 NOTE — DISCHARGE NOTE PROVIDER - NSDCPNSUBOBJ_GEN_ALL_CORE
Patient is comfortable on Room Air.   Plan for discharge home today.     ICU Vital Signs Last 24 Hrs  T(C): 36.4 (09 Mar 2023 11:22), Max: 37.1 (08 Mar 2023 20:53)  T(F): 97.5 (09 Mar 2023 11:22), Max: 98.8 (08 Mar 2023 20:53)  HR: 70 (09 Mar 2023 11:22) (70 - 81)  BP: 105/69 (09 Mar 2023 11:22) (104/66 - 109/73)  BP(mean): 82 (08 Mar 2023 17:42) (82 - 82)  ABP: --  ABP(mean): --  RR: 18 (09 Mar 2023 11:22) (16 - 18)  SpO2: 93% (09 Mar 2023 11:22) (91% - 96%)    O2 Parameters below as of 09 Mar 2023 11:22  Patient On (Oxygen Delivery Method): room air                          12.3   14.91 )-----------( 228      ( 09 Mar 2023 05:40 )             36.1   03-09    138  |  101  |  26<H>  ----------------------------<  109<H>  3.8   |  25  |  1.15    Ca    9.1      09 Mar 2023 05:40    TPro  6.1  /  Alb  3.9  /  TBili  0.8  /  DBili  x   /  AST  27  /  ALT  26  /  AlkPhos  165<H>  03-09      General: WN/WD NAD  Neurology: A&Ox3, nonfocal, MARSHALL x 4  Head:  Normocephalic, atraumatic  ENT:  Mucosa moist, no ulcerations  Neck:  Supple, no sinuses or palpable masses  Lymphatic:  No palpable cervical, supraclavicular, axillary or inguinal adenopathy  Respiratory: CTA B/L  CV: RRR, S1S2, no murmur  Abdominal: Soft, NT, ND no palpable mass  MSK: No edema, + peripheral pulses, FROM all 4 extremity  Incisions: intact, no erythema or drainage

## 2023-03-09 NOTE — DISCHARGE NOTE PROVIDER - NSDCFUADDAPPT_GEN_ALL_CORE_FT
Follow up with Dr. Juan on 3/21 @ 8:30am at [address] for you post op follow up appointment, if you are unable to keep this appointment please call the [facility] office to re-schedule at [phone].

## 2023-03-09 NOTE — DISCHARGE NOTE PROVIDER - CARE PROVIDER_API CALL
Buster Juan)  Thoracic and Cardiac Surgery  [address] [place] [address]  Phone: [phone]  Fax: [phone]  Scheduled Appointment: 03/21/2023 08:30 AM

## 2023-03-09 NOTE — DISCHARGE NOTE PROVIDER - HOSPITAL COURSE
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 His medical history significant for HTN, GERD, hyperlipidemia, bicuspid aortic valve, deviated nasal septum, lower back pain. He denies any chest pain, no SOB.  covid-19 PCR test on 3/1/2023.  (24 Feb 2023 11:03)    Patient underwent AVR 3/3. recovered in   CTU  post operative hypoxia.    3/4 VSS , +HF NC, + +nesbitt, +2 chest tubes, +ext pacer, A&Ox4, agreeable to PT  Disposition to home  3/5 VSS hypoxia vivien placed   3/6 VSS Hypoxia HFNC    3/7- VSS  all invasive lines removed + EPM   ERP protocol. transferred to [address] [place] [address]. ON 5 LNC wean oxygen soft BP-  albumin 100cc.  3/8 Post op hypoxia, wean o2 as tolerated.   Hypotension  yesteday  off diuretics.  Chest PT and pain management  D/c planning  < from: TTE with Doppler (w/Cont) (03.07.23 @ 18:02) >  Technically difficult study.  1. Mild mitral annular calcification, otherwise normal  mitral valve. Minimal mitral regurgitation.  2. Bioprosthetic aortic valve replacement not well  visualized. Peak transaortic valve gradient equals 15 mm  Hg, mean transaortic valve gradient equals 9 mm Hg, which  is probably normal in the presence of a bioprosthetic  aortic valve. No aortic valve regurgitation seen.  3. Endocardial visualization enhanced with intravenous  injection of Ultrasonic Enhancing Agent (Lumason). Left  ventricle suboptimally visualized despite intravenous  ultrasonic enhancing agent; grossly preserved left  ventricular systolic function. Septal motion consistent  with prior cardiac surgery. No left ventricular thrombus.  4. The right ventricle is not well visualized; grossly  preserved right ventricular systolic function.  5. No pericardial effusion seen.  *** No previous Echo exam.  < end of copied text >  3/9 VSS; Comfortable on room air SpO2 94%. Plan for discharge home today.

## 2023-03-10 ENCOUNTER — APPOINTMENT (OUTPATIENT)
Dept: CARE COORDINATION | Facility: HOME HEALTH | Age: 58
End: 2023-03-10
Payer: COMMERCIAL

## 2023-03-10 VITALS
HEART RATE: 92 BPM | OXYGEN SATURATION: 94 % | SYSTOLIC BLOOD PRESSURE: 128 MMHG | DIASTOLIC BLOOD PRESSURE: 72 MMHG | RESPIRATION RATE: 12 BRPM

## 2023-03-10 PROCEDURE — 99024 POSTOP FOLLOW-UP VISIT: CPT

## 2023-03-10 NOTE — REASON FOR VISIT
[Post Hospitalization] : a post hospitalization visit [Spouse] : spouse [FreeTextEntry1] : FOLLOW YOUR HEART - Transitional Care Management Program - [facility]

## 2023-03-10 NOTE — HISTORY OF PRESENT ILLNESS
[FreeTextEntry1] : 57 year old male with h/o repair coarctation of aorta at age 8, states aortic narrowing, presents for preop testing for scheduled AVR on 3/3/2023. His medical history significant for HTN, GERD, hyperlipidemia, bicuspid aortic valve, deviated nasal septum, lower back pain. \par 3/3- s/p AVR. recovered in   CTU  post operative hypoxia.\par 3/4 VSS , +HF NC, + +nesbitt, +2 chest tubes, +ext pacer\par 3/5 VSS hypoxia vivien placed\par 3/6 VSS Hypoxia HFNC\par  3/7- VSS  all invasive lines removed + EPM   ERP protocol. transferred to \par [facility]. ON 5 LNC wean oxygen soft BP-  albumin 100cc.\par 3/8 Post op hypoxia, wean o2 as tolerated.\par Hypotension  yesteday  off diuretics.  Chest PT and pain management\par 3/9 VSS; Comfortable on room air SpO2 94%. Plan for discharge home today.\par 3/10- Seen by [facility] NP for a f/u visit. Emotional support and education provided. All questions answered. Pt overall is recovering well w/o complaints. He states he feels much better since his sx.\par \par

## 2023-03-10 NOTE — ASSESSMENT
[FreeTextEntry1] : Pt recovering well at home s/p cardiac surgery. Good family support was noted from pt wife whom is a . Reviewed all medications and dosages with pt understanding. Pt has all medications in home and is taking as prescribed. Pain controlled with current medication regimen.Pt is aware of F/U appts scheduled and that need to be scheduled as listed below. \par

## 2023-03-10 NOTE — PHYSICAL EXAM
[Sclera] : the sclera and conjunctiva were normal [Neck Appearance] : the appearance of the neck was normal [] : no respiratory distress [Respiration, Rhythm And Depth] : normal respiratory rhythm and effort [Exaggerated Use Of Accessory Muscles For Inspiration] : no accessory muscle use [Auscultation Breath Sounds / Voice Sounds] : lungs were clear to auscultation bilaterally [Apical Impulse] : the apical impulse was normal [Heart Rate And Rhythm] : heart rate was normal and rhythm regular [Heart Sounds] : normal S1 and S2 [Heart Sounds Gallop] : no gallops [Murmurs] : no murmurs [Heart Sounds Pericardial Friction Rub] : no pericardial rub [FreeTextEntry1] : MSI & CT sites without erythema, drainage or warmth, with edges well approximated.   CT Sutures intact. Sternum stable. BL LE -no edema. [Examination Of The Chest] : the chest was normal in appearance [Chest Visual Inspection Thoracic Asymmetry] : no chest asymmetry [Diminished Respiratory Excursion] : normal chest expansion [Bowel Sounds] : normal bowel sounds [Abdomen Soft] : soft [Abdomen Tenderness] : non-tender [Abnormal Walk] : normal gait [Skin Color & Pigmentation] : normal skin color and pigmentation [Sensation] : the sensory exam was normal to light touch and pinprick [Motor Exam] : the motor exam was normal [No Focal Deficits] : no focal deficits [Oriented To Time, Place, And Person] : oriented to person, place, and time [Impaired Insight] : insight and judgment were intact [Affect] : the affect was normal [Mood] : the mood was normal

## 2023-03-13 LAB — SURGICAL PATHOLOGY STUDY: SIGNIFICANT CHANGE UP

## 2023-03-18 PROBLEM — Z95.3 S/P AORTIC VALVE REPLACEMENT WITH BIOPROSTHETIC VALVE: Status: ACTIVE | Noted: 2023-03-18

## 2023-03-21 ENCOUNTER — APPOINTMENT (OUTPATIENT)
Dept: CARDIOTHORACIC SURGERY | Facility: CLINIC | Age: 58
End: 2023-03-21
Payer: COMMERCIAL

## 2023-03-21 VITALS
SYSTOLIC BLOOD PRESSURE: 118 MMHG | DIASTOLIC BLOOD PRESSURE: 79 MMHG | RESPIRATION RATE: 12 BRPM | HEART RATE: 76 BPM | HEIGHT: 70 IN | TEMPERATURE: 98.2 F | WEIGHT: 192 LBS | BODY MASS INDEX: 27.49 KG/M2 | OXYGEN SATURATION: 98 %

## 2023-03-21 DIAGNOSIS — Z95.3 PRESENCE OF XENOGENIC HEART VALVE: ICD-10-CM

## 2023-03-21 PROCEDURE — 99024 POSTOP FOLLOW-UP VISIT: CPT

## 2023-03-21 RX ORDER — OXYCODONE 5 MG/1
5 TABLET ORAL EVERY 6 HOURS
Refills: 0 | Status: COMPLETED | COMMUNITY
Start: 2023-03-09 | End: 2023-03-21

## 2023-03-21 RX ORDER — PANTOPRAZOLE 40 MG/1
40 TABLET, DELAYED RELEASE ORAL DAILY
Qty: 30 | Refills: 0 | Status: COMPLETED | COMMUNITY
Start: 2023-03-09 | End: 2023-03-21

## 2023-03-21 RX ORDER — SENNA 8.6 MG/1
8.6 TABLET, FILM COATED ORAL
Qty: 40 | Refills: 0 | Status: COMPLETED | COMMUNITY
Start: 2023-03-09 | End: 2023-03-21

## 2023-03-21 NOTE — ASSESSMENT
[FreeTextEntry1] : Today on exam patient's lungs clear bilaterally, normal sinus rhythm, sternum stable, incision clean, dry and intact.  No peripheral edema noted. Instructed patient on importance of optimal glycemic control, daily showering, daily weights, incentive spirometer use, and increase ambulation as tolerated. Instructed to call office with any signs or symptoms of infection or weight gain of 2 or more pounds in 1 day or 3 or more pounds in 1 week. \par \par Plan:\par \par - Continue current medication regimen\par - Follow up with cardiologist / Kiya\par - Continue to walk and increase as tolerated\par - Low salt diet and fluids discussed in detail\par - Call with any questions or concerns\par - SBE antibiotic prophylaxis discussed at length \par - Follow up as needed\par \par

## 2023-03-21 NOTE — PROCEDURE
[FreeTextEntry1] : TTE with Doppler (w/Cont) \par 03.07.23\par Technically difficult study. \par 1. Mild mitral annular calcification, otherwise normal \par mitral valve. Minimal mitral regurgitation. \par 2. Bioprosthetic aortic valve replacement not well \par visualized. Peak transaortic valve gradient equals 15 mm \par Hg, mean transaortic valve gradient equals 9 mm Hg, which \par is probably normal in the presence of a bioprosthetic \par aortic valve. No aortic valve regurgitation seen. \par 3. Endocardial visualization enhanced with intravenous \par injection of Ultrasonic Enhancing Agent (Lumason). Left \par ventricle suboptimally visualized despite intravenous \par ultrasonic enhancing agent; grossly preserved left \par ventricular systolic function. Septal motion consistent \par with prior cardiac surgery. No left ventricular thrombus. \par 4. The right ventricle is not well visualized; grossly \par preserved right ventricular systolic function. \par 5. No pericardial effusion seen. \par \par

## 2023-03-27 ENCOUNTER — NON-APPOINTMENT (OUTPATIENT)
Age: 58
End: 2023-03-27

## 2023-03-27 ENCOUNTER — APPOINTMENT (OUTPATIENT)
Dept: CARDIOLOGY | Facility: CLINIC | Age: 58
End: 2023-03-27
Payer: COMMERCIAL

## 2023-03-27 VITALS
DIASTOLIC BLOOD PRESSURE: 71 MMHG | OXYGEN SATURATION: 95 % | BODY MASS INDEX: 27.77 KG/M2 | WEIGHT: 194 LBS | HEIGHT: 70 IN | HEART RATE: 59 BPM | SYSTOLIC BLOOD PRESSURE: 108 MMHG

## 2023-03-27 PROCEDURE — 93000 ELECTROCARDIOGRAM COMPLETE: CPT

## 2023-03-27 PROCEDURE — 99214 OFFICE O/P EST MOD 30 MIN: CPT | Mod: 25

## 2023-03-27 RX ORDER — AMOXICILLIN 500 MG/1
500 CAPSULE ORAL
Qty: 12 | Refills: 3 | Status: ACTIVE | COMMUNITY
Start: 2023-03-27 | End: 1900-01-01

## 2023-03-27 RX ORDER — METOPROLOL TARTRATE 25 MG/1
25 TABLET, FILM COATED ORAL TWICE DAILY
Qty: 180 | Refills: 3 | Status: ACTIVE | COMMUNITY
Start: 2023-03-09 | End: 1900-01-01

## 2023-03-27 NOTE — HISTORY OF PRESENT ILLNESS
[FreeTextEntry1] : Dean Atkins presented to the office today for a cardiovascular evaluation. He was last seen in the office in October, though he was more recently hospitalized.\par \par He is now 58 years old, with a history of coarctation of aorta, for which he had surgery many years ago.  A modest degree of stenosis remains based on MRA for which she had stenting performed in January, 2023. He has a bicuspid aortic valve, now status post bioprosthetic aortic valve replacement, a March 3, 2023.   \par \par When he was evaluated in the office in October, he was feeling well.  Follow-up echocardiography in November 2022 revealed significant progression in his aortic valve disease, now in the severe range.  I referred him for an evaluation with the structural heart team.  It was felt that he was best suited for a surgical aortic valve replacement, with stenting of his persistent coarctation prior to the presurgery to allow for the best possible myocardial recovery.   \par \par He presents to the office today having been feeling well from a cardiovascular perspective.  He reports no chest discomfort or shortness of breath with activity.  He remains with a degree of sternal discomfort, which is relatively mild, for which he takes Tylenol before bed.  He denies orthopnea, PND and lower extremity edema.  He denies palpitations, dizziness and syncope.  His blood pressure has been much lower than usual.  In the hospital he was changed from amlodipine over to metoprolol tartrate.  He has been tolerating that well.  He remains on temporary disability until the end of April.

## 2023-03-27 NOTE — PHYSICAL EXAM
[General Appearance - Well Developed] : well developed [Normal Appearance] : normal appearance [Well Groomed] : well groomed [General Appearance - Well Nourished] : well nourished [No Deformities] : no deformities [General Appearance - In No Acute Distress] : no acute distress [Normal Conjunctiva] : the conjunctiva exhibited no abnormalities [Eyelids - No Xanthelasma] : the eyelids demonstrated no xanthelasmas [Normal Oral Mucosa] : normal oral mucosa [No Oral Pallor] : no oral pallor [No Oral Cyanosis] : no oral cyanosis [Normal Jugular Venous A Waves Present] : normal jugular venous A waves present [Normal Jugular Venous V Waves Present] : normal jugular venous V waves present [No Jugular Venous Rachel A Waves] : no jugular venous rachel A waves [Respiration, Rhythm And Depth] : normal respiratory rhythm and effort [Exaggerated Use Of Accessory Muscles For Inspiration] : no accessory muscle use [Auscultation Breath Sounds / Voice Sounds] : lungs were clear to auscultation bilaterally [Abdomen Soft] : soft [Abdomen Tenderness] : non-tender [Abdomen Mass (___ Cm)] : no abdominal mass palpated [Abnormal Walk] : normal gait [Gait - Sufficient For Exercise Testing] : the gait was sufficient for exercise testing [Nail Clubbing] : no clubbing of the fingernails [Cyanosis, Localized] : no localized cyanosis [Petechial Hemorrhages (___cm)] : no petechial hemorrhages [Skin Color & Pigmentation] : normal skin color and pigmentation [] : no rash [No Venous Stasis] : no venous stasis [Skin Lesions] : no skin lesions [No Skin Ulcers] : no skin ulcer [No Xanthoma] : no  xanthoma was observed [Oriented To Time, Place, And Person] : oriented to person, place, and time [Affect] : the affect was normal [Mood] : the mood was normal [No Anxiety] : not feeling anxious [Normal Rate] : normal [Rhythm Regular] : regular [Normal S1] : normal S1 [Normal S2] : normal S2 [No Gallop] : no gallop heard [I] : a grade 1 [Right Carotid Bruit] : right carotid bruit heard [1+] : left 1+ [No Pitting Edema] : no pitting edema present [S3] : no S3 [S4] : no S4 [No Murmur] : no murmurs heard [Left Carotid Bruit] : no bruit heard over the left carotid [Right Femoral Bruit] : no bruit heard over the right femoral artery [Left Femoral Bruit] : no bruit heard over the left femoral artery [Bruit] : no bruit heard

## 2023-03-27 NOTE — DISCUSSION/SUMMARY
[FreeTextEntry1] : Dean has a history of a repaired coarctation of the aorta. He has a history of a bicuspid aortic valve with severe stenosis.  This more recently progressed, and he is now status post stenting of his residual coarctation, as well as surgical bioprosthetic aortic valve replacement.\par \par No additional testing seems needed at this time.  There was a recommendation by pediatric cardiology in the hospital that he get screened with an MRA to look for aneurysms.  It sounds like he did have one at the age of 31, when he had an episode of transient global amnesia.  It seems unlikely that he needs to have another 1, but if we cannot get that study, or if that study was insufficient, I will order an MRA of the brain at around the time of the next visit.  His residual coarctation is unlikely to have been causing any significant issues such that an aneurysm seems likely, but it may be prudent simply to get the MRI, and cross it off the list.

## 2023-05-04 ENCOUNTER — NON-APPOINTMENT (OUTPATIENT)
Age: 58
End: 2023-05-04

## 2023-05-16 ENCOUNTER — NON-APPOINTMENT (OUTPATIENT)
Age: 58
End: 2023-05-16

## 2023-05-16 ENCOUNTER — APPOINTMENT (OUTPATIENT)
Dept: CARDIOLOGY | Facility: CLINIC | Age: 58
End: 2023-05-16
Payer: COMMERCIAL

## 2023-05-16 VITALS
SYSTOLIC BLOOD PRESSURE: 132 MMHG | DIASTOLIC BLOOD PRESSURE: 77 MMHG | OXYGEN SATURATION: 97 % | HEART RATE: 58 BPM | BODY MASS INDEX: 29.06 KG/M2 | WEIGHT: 203 LBS | HEIGHT: 70 IN

## 2023-05-16 VITALS — DIASTOLIC BLOOD PRESSURE: 75 MMHG | SYSTOLIC BLOOD PRESSURE: 125 MMHG

## 2023-05-16 PROCEDURE — 99214 OFFICE O/P EST MOD 30 MIN: CPT | Mod: 25

## 2023-05-16 PROCEDURE — 93000 ELECTROCARDIOGRAM COMPLETE: CPT

## 2023-05-16 NOTE — PHYSICAL EXAM
[General Appearance - Well Developed] : well developed [Normal Appearance] : normal appearance [Well Groomed] : well groomed [General Appearance - Well Nourished] : well nourished [No Deformities] : no deformities [General Appearance - In No Acute Distress] : no acute distress [Normal Conjunctiva] : the conjunctiva exhibited no abnormalities [Eyelids - No Xanthelasma] : the eyelids demonstrated no xanthelasmas [Normal Oral Mucosa] : normal oral mucosa [No Oral Pallor] : no oral pallor [No Oral Cyanosis] : no oral cyanosis [Normal Jugular Venous A Waves Present] : normal jugular venous A waves present [Normal Jugular Venous V Waves Present] : normal jugular venous V waves present [No Jugular Venous Rachel A Waves] : no jugular venous rachel A waves [Respiration, Rhythm And Depth] : normal respiratory rhythm and effort [Exaggerated Use Of Accessory Muscles For Inspiration] : no accessory muscle use [Auscultation Breath Sounds / Voice Sounds] : lungs were clear to auscultation bilaterally [Abdomen Soft] : soft [Abdomen Tenderness] : non-tender [Abdomen Mass (___ Cm)] : no abdominal mass palpated [Abnormal Walk] : normal gait [Gait - Sufficient For Exercise Testing] : the gait was sufficient for exercise testing [Nail Clubbing] : no clubbing of the fingernails [Cyanosis, Localized] : no localized cyanosis [Petechial Hemorrhages (___cm)] : no petechial hemorrhages [Skin Color & Pigmentation] : normal skin color and pigmentation [] : no rash [No Venous Stasis] : no venous stasis [Skin Lesions] : no skin lesions [No Skin Ulcers] : no skin ulcer [No Xanthoma] : no  xanthoma was observed [Oriented To Time, Place, And Person] : oriented to person, place, and time [Affect] : the affect was normal [Mood] : the mood was normal [No Anxiety] : not feeling anxious [Normal Rate] : normal [Normal S1] : normal S1 [Rhythm Regular] : regular [Normal S2] : normal S2 [No Gallop] : no gallop heard [I] : a grade 1 [No Murmur] : no murmurs heard [Right Carotid Bruit] : right carotid bruit heard [1+] : left 1+ [No Pitting Edema] : no pitting edema present [S3] : no S3 [S4] : no S4 [Left Carotid Bruit] : no bruit heard over the left carotid [Right Femoral Bruit] : no bruit heard over the right femoral artery [Left Femoral Bruit] : no bruit heard over the left femoral artery [Bruit] : no bruit heard

## 2023-05-16 NOTE — DISCUSSION/SUMMARY
[FreeTextEntry1] : Dean has a history of a repaired coarctation of the aorta. He has a history of a bicuspid aortic valve with severe stenosis.  This more recently progressed, and he is now status post stenting of his residual coarctation, as well as surgical bioprosthetic aortic valve replacement.\par \par No additional testing seems needed at this time.  There was a recommendation by pediatric cardiology in the hospital that he get screened with an MRA to look for aneurysms.  It sounds like he did have one at the age of 31, when he had an episode of transient global amnesia.  Unfortunately that is not available, and so when he is feeling up to it, he will schedule another MRA.  I expect that this will be normal.\par \par I reviewed his most recent blood work.  I reviewed his echocardiogram from February 2023, his catheterization from January 2023, and his recent hospital course.\par \par I suggested follow-up in about 3 months.

## 2023-05-16 NOTE — HISTORY OF PRESENT ILLNESS
[FreeTextEntry1] : Dean Atkins presented to the office today for a cardiovascular evaluation. He was last seen in the office March 2023.\par \par He is now 58 years old, with a history of coarctation of aorta, for which he had surgery many years ago.  A modest degree of stenosis remains based on MRA for which she had stenting performed in January, 2023. He has a bicuspid aortic valve, now status post bioprosthetic aortic valve replacement,  March 3, 2023.   \par \par When he was evaluated in the office in October, he was feeling well.  Follow-up echocardiography in November 2022 revealed significant progression in his aortic valve disease, now in the severe range.  I referred him for an evaluation with the structural heart team.  It was felt that he was best suited for a surgical aortic valve replacement, with stenting of his persistent coarctation prior to the presurgery to allow for the best possible myocardial recovery.   \par \par I saw him in March 2023, postoperatively, and he was feeling well.  We discussed the possibility of an MRI for intracranial aneurysms, which had been recommended by pediatric cardiologist he saw in the hospital.  I ordered it, but that was not performed.\par \par He presents to the office today having been feeling well from a cardiovascular perspective.  He reports no  shortness of breath with activity.  He continues to experience some sternal chest discomfort with activity, and at times unrelated to activity.  This is moderating somewhat as time goes on relative to his recent surgery.    He denies orthopnea, PND and lower extremity edema.  He denies palpitations, dizziness and syncope.  His blood pressure has remained lower than usual.  In the hospital he was changed from amlodipine over to metoprolol tartrate.  He has been tolerating that well.  He does feel stronger overall.  He has been experiencing some neck discomfort, and continues to do certain exercises as suggested by physical therapy, in order to moderate that.  He has been trying to walk further every day, and has been walking about a mile twice daily, whenever possible.

## 2023-07-07 NOTE — H&P PST ADULT - GENERAL
Pt tolerating ice chips, having more pressure, feels like she has to urinate. Pt medicated with fentanyl 25 mcg iv. VSS    [id number] d/c instructions with pt's , all questions answered. Reviewed when to call the doctor,diet,activity and hygiene. Reviewed when/what to take for pain, leg exercises, use of pads and how to prevent an infection. Pt medicated with fentanyl 25mcg iv to equal 50mcg iv for continued pain. 1300-As sitting pt up, she became nauseated, pt medicated with droperidol 0.625 mg iv. Iv fluids opened. Pt reconnected to monitor. 1325-Pt's blood pressure continues to be low, pt dizzy upon standing to get to wheelchair. 1340-Pt unable to urinate, pt assisted back to bed, bladder scan done, no urine and 2nd RN also checked bladder scan. No urine noted on multiple scans. 1355-pt resting to sleep off anethesia and improve dizzy feeling. 1425- Pt feels ready to go home now, feels more alert, dizziness gone. Updated pt's  on pt's status and that pt had received tylenol 650 mg in recovery room at this time and also wrote on d/c instructions. 1450-Transported via w/c to awaiting transportation. Pt has all of her belongings. Pt and  instructed if pt unable to urinate by 10 pm tonight to call on call physician. negative

## 2023-08-17 ENCOUNTER — NON-APPOINTMENT (OUTPATIENT)
Age: 58
End: 2023-08-17

## 2023-08-17 ENCOUNTER — APPOINTMENT (OUTPATIENT)
Dept: CARDIOLOGY | Facility: CLINIC | Age: 58
End: 2023-08-17
Payer: COMMERCIAL

## 2023-08-17 VITALS
WEIGHT: 199 LBS | DIASTOLIC BLOOD PRESSURE: 76 MMHG | OXYGEN SATURATION: 97 % | HEIGHT: 70 IN | HEART RATE: 62 BPM | SYSTOLIC BLOOD PRESSURE: 115 MMHG | BODY MASS INDEX: 28.49 KG/M2

## 2023-08-17 PROCEDURE — 99214 OFFICE O/P EST MOD 30 MIN: CPT | Mod: 25

## 2023-08-17 PROCEDURE — 93000 ELECTROCARDIOGRAM COMPLETE: CPT

## 2023-08-17 RX ORDER — LANSOPRAZOLE 15 MG/1
15 TABLET, ORALLY DISINTEGRATING, DELAYED RELEASE ORAL
Refills: 0 | Status: ACTIVE | COMMUNITY

## 2023-08-17 NOTE — HISTORY OF PRESENT ILLNESS
[FreeTextEntry1] : Dean Atkins presented to the office today for a cardiovascular evaluation. He was last seen in the office May, 2023.  He is now 58 years old, with a history of coarctation of aorta, for which he had surgery many years ago.  A modest degree of stenosis remains based on MRA for which she had stenting performed in January, 2023. He has a bicuspid aortic valve, now status post bioprosthetic aortic valve replacement,  March 3, 2023.     When he was evaluated in the office in October, he was feeling well.  Follow-up echocardiography in November 2022 revealed significant progression in his aortic valve disease, into the severe range.  I referred him for an evaluation with the structural heart team.  It was felt that he was best suited for a surgical aortic valve replacement, with stenting of his persistent coarctation prior to the surgery to allow for the best possible myocardial recovery.     I saw him in March 2023, postoperatively, and he was feeling well.  We discussed the possibility of an MRI for intracranial aneurysms, which had been recommended by pediatric cardiologist he saw in the hospital.  I ordered it, but that was not performed.  I saw him again in May 2023, and he was feeling well at that time.  He was steadily improving in terms of his surgery, and had been increasing his physical activity.  He presents to the office today having been feeling well from a cardiovascular perspective.  He reports no chest discomfort or shortness of breath with activity.  His sternal discomfort has improved. His stamina has been improving.  He has been commuting back and forth to [place] 3 days a week, walking and climbing stairs. He denies orthopnea, PND and lower extremity edema.  He denies palpitations, dizziness and syncope.   He reports that his blood pressure has been well controlled.

## 2023-08-17 NOTE — DISCUSSION/SUMMARY
[FreeTextEntry1] : Dean has a history of a repaired coarctation of the aorta. He has a history of a bicuspid aortic valve with severe stenosis.  This more recently progressed, and he is now status post stenting of his residual coarctation, as well as surgical bioprosthetic aortic valve replacement.  No additional testing seems needed at this time.  There was a recommendation by pediatric cardiology in the hospital that he get screened with an MRA to look for aneurysms.  It sounds like he did have one at the age of 31, when he had an episode of transient global amnesia.  Unfortunately that is not available, and so when he is feeling up to it, he will schedule another MRA.  We have discussed this before, but the authorization .  When he is ready to schedule, he will let us know.  I expect that this will be normal.  I reviewed his most recent blood work.  I reviewed his echocardiogram from 2023, his catheterization from 2023, and his recent hospital course.  I suggested follow-up in about 6 months. [EKG obtained to assist in diagnosis and management of assessed problem(s)] : EKG obtained to assist in diagnosis and management of assessed problem(s)

## 2023-08-17 NOTE — PHYSICAL EXAM
[General Appearance - Well Developed] : well developed [Normal Appearance] : normal appearance [Well Groomed] : well groomed [General Appearance - Well Nourished] : well nourished [No Deformities] : no deformities [General Appearance - In No Acute Distress] : no acute distress [Normal Conjunctiva] : the conjunctiva exhibited no abnormalities [Eyelids - No Xanthelasma] : the eyelids demonstrated no xanthelasmas [Normal Oral Mucosa] : normal oral mucosa [No Oral Pallor] : no oral pallor [No Oral Cyanosis] : no oral cyanosis [Normal Jugular Venous A Waves Present] : normal jugular venous A waves present [Normal Jugular Venous V Waves Present] : normal jugular venous V waves present [No Jugular Venous Rachel A Waves] : no jugular venous racehl A waves [Respiration, Rhythm And Depth] : normal respiratory rhythm and effort [Exaggerated Use Of Accessory Muscles For Inspiration] : no accessory muscle use [Auscultation Breath Sounds / Voice Sounds] : lungs were clear to auscultation bilaterally [Abdomen Soft] : soft [Abdomen Tenderness] : non-tender [Abdomen Mass (___ Cm)] : no abdominal mass palpated [Abnormal Walk] : normal gait [Gait - Sufficient For Exercise Testing] : the gait was sufficient for exercise testing [Nail Clubbing] : no clubbing of the fingernails [Cyanosis, Localized] : no localized cyanosis [Petechial Hemorrhages (___cm)] : no petechial hemorrhages [Skin Color & Pigmentation] : normal skin color and pigmentation [] : no rash [Skin Lesions] : no skin lesions [No Venous Stasis] : no venous stasis [No Xanthoma] : no  xanthoma was observed [No Skin Ulcers] : no skin ulcer [Oriented To Time, Place, And Person] : oriented to person, place, and time [Affect] : the affect was normal [Mood] : the mood was normal [No Anxiety] : not feeling anxious [Normal Rate] : normal [Rhythm Regular] : regular [Normal S1] : normal S1 [Normal S2] : normal S2 [No Gallop] : no gallop heard [No Murmur] : no murmurs heard [I] : a grade 1 [Right Carotid Bruit] : right carotid bruit heard [1+] : left 1+ [No Pitting Edema] : no pitting edema present [S3] : no S3 [S4] : no S4 [Left Carotid Bruit] : no bruit heard over the left carotid [Right Femoral Bruit] : no bruit heard over the right femoral artery [Left Femoral Bruit] : no bruit heard over the left femoral artery [Bruit] : no bruit heard

## 2024-02-09 ENCOUNTER — APPOINTMENT (OUTPATIENT)
Dept: CARDIOLOGY | Facility: CLINIC | Age: 59
End: 2024-02-09
Payer: COMMERCIAL

## 2024-02-09 ENCOUNTER — NON-APPOINTMENT (OUTPATIENT)
Age: 59
End: 2024-02-09

## 2024-02-09 VITALS
HEIGHT: 70 IN | BODY MASS INDEX: 28.06 KG/M2 | SYSTOLIC BLOOD PRESSURE: 115 MMHG | DIASTOLIC BLOOD PRESSURE: 76 MMHG | OXYGEN SATURATION: 97 % | HEART RATE: 55 BPM | WEIGHT: 196 LBS

## 2024-02-09 DIAGNOSIS — I35.0 NONRHEUMATIC AORTIC (VALVE) STENOSIS: ICD-10-CM

## 2024-02-09 DIAGNOSIS — I71.20 THORACIC AORTIC ANEURYSM, WITHOUT RUPTURE, UNSPECIFIED: ICD-10-CM

## 2024-02-09 PROCEDURE — 93000 ELECTROCARDIOGRAM COMPLETE: CPT

## 2024-02-09 PROCEDURE — 99214 OFFICE O/P EST MOD 30 MIN: CPT | Mod: 25

## 2024-02-09 NOTE — DISCUSSION/SUMMARY
[EKG obtained to assist in diagnosis and management of assessed problem(s)] : EKG obtained to assist in diagnosis and management of assessed problem(s) [FreeTextEntry1] : Dean has a history of a repaired coarctation of the aorta. He has a history of a bicuspid aortic valve with severe stenosis.  This more recently progressed, and he is now status post stenting of his residual coarctation, as well as surgical bioprosthetic aortic valve replacement.  It has been a year since his aortic valve replacement, and he will schedule an echocardiogram. There was a recommendation by pediatric cardiology in the hospital that he get screened with an MRA to look for aneurysms.  It sounds like he did have one at the age of 31, when he had an episode of transient global amnesia.  Unfortunately that is not available, and so when he is feeling up to it, he will schedule another MRA.  We have discussed this before, but the authorization .  When he is ready to schedule, he will let us know.  I expect that this will be normal.  I reviewed his most recent blood work.  I reviewed his echocardiogram from 2023, his catheterization from 2023, and his recent hospital course.  From the perspective of his planned procedures, he can hold aspirin for 5 days prior to the procedures.  He may resume it afterward.  He does not need antibiotic prophylaxis prior to colonoscopy.  It is a little less clear whether antibiotics would be appropriate for prophylactic purposes prior to a transrectal prostate biopsy, but antibiotics are likely to be administered for urologic reasons, if not cardiac ones.  I suggested follow-up in about 6 months.

## 2024-02-09 NOTE — HISTORY OF PRESENT ILLNESS
[FreeTextEntry1] : Dean Atkins presented to the office today for a cardiovascular evaluation. He was last seen in the office 6 months ago.  He is now 58 years old, with a history of coarctation of aorta, for which he had surgery many years ago.  A modest degree of stenosis remains based on MRA for which she had stenting performed in January, 2023. He has a bicuspid aortic valve, now status post bioprosthetic aortic valve replacement,  March 3, 2023.     When he was evaluated in the office in October, he was feeling well.  Follow-up echocardiography in November 2022 revealed significant progression in his aortic valve disease, into the severe range.  I referred him for an evaluation with the structural heart team.  It was felt that he was best suited for a surgical aortic valve replacement, with stenting of his persistent coarctation prior to the surgery to allow for the best possible myocardial recovery.     I saw him in March 2023, postoperatively, and he was feeling well.  We discussed the possibility of an MRI for intracranial aneurysms, which had been recommended by the pediatric cardiologist he saw in the hospital.  I ordered it, but that was not performed.  I saw him again in May 2023, and he was feeling well at that time.  He was steadily improving in terms of his surgery, and had been increasing his physical activity.  He presents to the office today having been feeling well from a cardiovascular perspective.  He reports no chest discomfort or shortness of breath with activity. His stamina has been improving.  He has been commuting back and forth to [place] 4 days a week, walking and climbing stairs. He denies orthopnea, PND and lower extremity edema.  He denies palpitations, and syncope.  He may get a little lightheaded when he gets up quickly.  His PSA has been increasing, and an MRI revealed a mild abnormality, but worthy of biopsy.  He is also due for colonoscopy. His hemoglobin A1c have been a bit elevated and he has been watching his diet.

## 2024-02-09 NOTE — PHYSICAL EXAM
[General Appearance - Well Developed] : well developed [Normal Appearance] : normal appearance [Well Groomed] : well groomed [General Appearance - Well Nourished] : well nourished [No Deformities] : no deformities [General Appearance - In No Acute Distress] : no acute distress [Normal Conjunctiva] : the conjunctiva exhibited no abnormalities [Eyelids - No Xanthelasma] : the eyelids demonstrated no xanthelasmas [Normal Oral Mucosa] : normal oral mucosa [No Oral Pallor] : no oral pallor [No Oral Cyanosis] : no oral cyanosis [Normal Jugular Venous V Waves Present] : normal jugular venous V waves present [Normal Jugular Venous A Waves Present] : normal jugular venous A waves present [No Jugular Venous Rachel A Waves] : no jugular venous rachel A waves [Respiration, Rhythm And Depth] : normal respiratory rhythm and effort [Auscultation Breath Sounds / Voice Sounds] : lungs were clear to auscultation bilaterally [Exaggerated Use Of Accessory Muscles For Inspiration] : no accessory muscle use [Abdomen Soft] : soft [Abdomen Tenderness] : non-tender [Abdomen Mass (___ Cm)] : no abdominal mass palpated [Abnormal Walk] : normal gait [Gait - Sufficient For Exercise Testing] : the gait was sufficient for exercise testing [Cyanosis, Localized] : no localized cyanosis [Nail Clubbing] : no clubbing of the fingernails [Petechial Hemorrhages (___cm)] : no petechial hemorrhages [Skin Color & Pigmentation] : normal skin color and pigmentation [] : no rash [No Venous Stasis] : no venous stasis [No Skin Ulcers] : no skin ulcer [Skin Lesions] : no skin lesions [No Xanthoma] : no  xanthoma was observed [Oriented To Time, Place, And Person] : oriented to person, place, and time [Affect] : the affect was normal [Mood] : the mood was normal [No Anxiety] : not feeling anxious [Normal Rate] : normal [Rhythm Regular] : regular [Normal S1] : normal S1 [Normal S2] : normal S2 [No Gallop] : no gallop heard [No Murmur] : no murmurs heard [I] : a grade 1 [Right Carotid Bruit] : right carotid bruit heard [1+] : left 1+ [No Pitting Edema] : no pitting edema present [S3] : no S3 [S4] : no S4 [Left Carotid Bruit] : no bruit heard over the left carotid [Right Femoral Bruit] : no bruit heard over the right femoral artery [Left Femoral Bruit] : no bruit heard over the left femoral artery [Bruit] : no bruit heard

## 2024-03-18 ENCOUNTER — APPOINTMENT (OUTPATIENT)
Dept: CARDIOLOGY | Facility: CLINIC | Age: 59
End: 2024-03-18
Payer: COMMERCIAL

## 2024-03-18 PROCEDURE — 93306 TTE W/DOPPLER COMPLETE: CPT

## 2024-06-28 NOTE — ASU PATIENT PROFILE, ADULT - NSTOBACCO TYPE_GEN_A_CORE_RD
6/28/2024    Physician No Ref-Primary  No address on file    RE: Cirilo Gardner       Dear Colleague,     I had the pleasure of seeing Cirilo Gardner in the [facility].    Cardiology Clinic Progress Note    C.O.R.E. Clinic Visit (Heart Failure Specialty Clinic)    Service Date: June 28, 2024  Primary Cardiology Team: Dr. Sung    HPI:   I had the pleasure of seeing . Cirilo Gardner in the clinic today. he is a very pleasant 65 year old male with a past medical history notable for obesity and no other known medical history as he has not seen a doctor in many years. He was admitted to [facility] on 6/12/2024 after presenting with confusion and expressive/receptive aphasia along with a right visual cut and being found to have an acute left MCA stroke. Cardiology was consulted as the patient was found to have a new diagnosis of a cardiomyopathy with severe LV systolic dysfunction, ejection fraction 15-20% and LV apical thrombus on echocardiogram. He was diuresed on IV Lasix and started on guideline directed medical therapy as outlined below. CT calcium score was completed on 6/18/24 with a total Agatston calcium score is 23.4 placing the patient in the 37th percentile when compared to age and gender matched control group with mild coronary calcifications suggesting against ischemic etiology for his cardiomyopathy.  A repeat echo prior to discharge showed gradual improvement in his EF at 30-35%. He was discharged with a Zio patch monitor for further evaluation of the possibility of underlying atrial fibrillation contributing to his stroke and cardiomyopathy.  Final results are not available yet at this time. His discharge weight was 252 pounds, and his weight has continued to gradually trend down to 242 pounds in the clinic today on a diuretic regimen of p.o. Lasix at 40 mg once daily.     Today, Mr. Gardner presents to the clinic in follow-up of his  recent hospitalization.  He has been staying at [facility]. Obtaining history is somewhat challenging as he comes in alone today and has continued expressive aphasia, but the patient tells me he has overall been feeling well from a cardiac standpoint. He denies symptoms of shortness of breath, chest pain, or palpitations. He continues to have significant lower extremity edema, but feels that this is improving somewhat since he has was in the hospital. Labs were completed on 6/27/2024 in anticipation of the visit today showing stable electrolytes and renal function with a potassium level 4.1 and creatinine at 0.88.  N-terminal pro BNP level was within normal limits for his age at 842. Blood pressure was elevated initially at 178/108 in the clinic today, but the patient notes that he has not received his morning medications yet. I rechecked this manually and got a reading of 154/98.     ASSESSMENT:  Recently diagnosed cardiomyopathy and biventricular heart failure, likely with a component of diastolic heart failure as well  - LVEF 15-20% initially, improved to 30-35% but most recent TTE 6/19/24 with moderate to severely reduced RV systolic function and grade 3 diastolic dysfunction.   - Etiology: Unclear at this time--Suspected nonischemic based on CT coronary calcium scan findings  - Fluid status: Challenging to assess due to body habitus, but appears well compensated with NT pro BNP level within normal limits and weight continuing to trend down.  - Diuretic regimen: Lasix 40 mg once daily  - Ischemic evaluation: Not completed yet, but CT calcium score 6/18/24--Showing relatively low calcium score of 23.4 suggesting against occlusive CAD     Guideline directed medical therapy (GDMT):  - Beta blocker: Carvedilol 25 mg BID  - ACEI/ARB/ARNI: Entresto 24-26 mg BID   - Aldactone antagonist: Spironolactone 25 mg daily   - SGLT2 inhibitor: Jardiance 10 mg once daily.     LV apical thrombus measuring 1.5 x 1.9 cm.  Initially started on warfarin, but transitioned to Eliquis prior to discharge.  Acute left MCA stroke likely due to problem #2  Residual expressive/receptive aphasia and confusion secondary to problem #3  Obesity  Bilateral lower extremity edema    PLAN:  - Ideally would like to try to go up on his Entresto, but blood pressure at the TCU earlier this week was 114/66 and he had some issues with borderline hypotension middle dose Entresto during his hospitalization so we will hold off for now and continue current diuretic regimen and GDMT as outlined above without changes.  - Will await the final results of the Zio patch monitor and can update him on these results by phone.  - Follow-up in the [facility] in about 2 months with repeat echocardiogram beforehand for reassessment of his ejection fraction. If EF remains significantly reduced at that time, would consider cardiac MRI with stress for further ischemic evaluation and rule out other potential causes for his cardiomyopathy.  - Continue to check daily weights and try to stick to a low-sodium diet. Provided [facility] clinic folder with information regarding heart failure management and signs/symptoms to watch for for when to call the clinic.    Thank you for the opportunity to participate in this patient's care. We would be happy to see him sooner if needed for any concerns in the meantime.      40 total minutes was spent today including chart review, precharting, history and exam, post visit documentation, and reviewing studies as outlined above.     DEBRA Mckeon, CNP   Nurse Practitioner  [facility]  Pager: [phone]  Text Page  (8am - 5pm, M-F)    Orders this Visit:  Orders Placed This Encounter   Procedures    Follow-Up with Cardiology- Core    Echocardiogram Complete     No orders of the defined types were placed in this encounter.    There are no discontinued medications.  Encounter Diagnoses   Name Primary?    Acute CVA (cerebrovascular  accident) (H)     Cardiomyopathy, unspecified type (H)     LV (left ventricular) mural thrombus     Acute on chronic systolic heart failure (H)        CURRENT MEDICATIONS:  Current Outpatient Medications   Medication Sig Dispense Refill    apixaban ANTICOAGULANT (ELIQUIS) 5 MG tablet Take 1 tablet (5 mg) by mouth 2 times daily      atorvastatin (LIPITOR) 40 MG tablet Take 1 tablet (40 mg) by mouth every evening      carvedilol (COREG) 25 MG tablet Take 0.5 tablets (12.5 mg) by mouth 2 times daily (with meals) HOLD If sbp <110 or HR <60 (Patient taking differently: Take 25 mg by mouth 2 times daily (with meals) HOLD If sbp <110 or HR <60)      cephALEXin (KEFLEX) 500 MG capsule Take 1 capsule (500 mg) by mouth 4 times daily for 5 days 20 capsule 0    empagliflozin (JARDIANCE) 10 MG TABS tablet Take 1 tablet (10 mg) by mouth daily      furosemide (LASIX) 40 MG tablet Take 1 tablet (40 mg) by mouth daily Hold if sbp <100      gabapentin (NEURONTIN) 100 MG capsule Take 1 capsule (100 mg) by mouth at bedtime (Patient taking differently: Take 300 mg by mouth at bedtime)      insulin aspart (NOVOLOG PEN) 100 UNIT/ML pen Inject 1-7 Units Subcutaneous 3 times daily (before meals) Correction Scale - MEDIUM INSULIN RESISTANCE DOSING     Do Not give Correction Insulin if Pre-Meal BG less than 140.   For Pre-Meal  - 189 give 1 unit.   For Pre-Meal  - 239 give 2 units.   For Pre-Meal  - 289 give 3 units.   For Pre-Meal  - 339 give 4 units.   For Pre-Meal - 389 give 5 units.   For Pre-Meal -439 give 6 units  For Pre-Meal BG greater than or equal to 440 give 7 units.   To be given with prandial insulin, and based on pre-meal blood glucose. Administering insulin within 5 minutes of the start of the meal is ideal. Administer insulin no more than 30 minutes after the start of the meal, unless directed otherwise by provider.     Notify provider if glucose greater than or equal to 350 mg/dL after  administration of correction dose.      insulin aspart (NOVOLOG PEN) 100 UNIT/ML pen Inject 1-5 Units Subcutaneous at bedtime MEDIUM INSULIN RESISTANCE DOSING    Do Not give Bedtime Correction Insulin if BG less than  200.   For  - 249 give 1 units.   For  - 299 give 2 units.   For  - 349 give 3 units.   For  -399 give 4 units.   For BG greater than or equal to 400 give 5 units.  Notify provider if glucose greater than or equal to 350 mg/dL after administration of correction dose.      levETIRAcetam (KEPPRA) 750 MG tablet Take 2 tablets (1,500 mg) by mouth 2 times daily      sacubitril-valsartan (ENTRESTO) 24-26 MG per tablet Take 1 tablet by mouth 2 times daily HOLD if sbp <105      spironolactone (ALDACTONE) 25 MG tablet Take 1 tablet (25 mg) by mouth daily HOLD if sbp <115         ALLERGIES  No Known Allergies    PAST MEDICAL, SURGICAL, FAMILY HISTORY:  History was reviewed and updated as needed, see medical record.    SOCIAL HISTORY:  Social History     Socioeconomic History    Marital status: Single     Spouse name: Not on file    Number of children: Not on file    Years of education: Not on file    Highest education level: Not on file   Occupational History    Not on file   Tobacco Use    Smoking status: Never    Smokeless tobacco: Never   Substance and Sexual Activity    Alcohol use: Not on file    Drug use: Not on file    Sexual activity: Not on file   Other Topics Concern    Not on file   Social History Narrative    Not on file     Social Determinants of Health     Financial Resource Strain: Not on file   Food Insecurity: Not on file   Transportation Needs: Not on file   Physical Activity: Not on file   Stress: Not on file   Social Connections: Not on file   Interpersonal Safety: Not on file   Housing Stability: Not on file     Review of Systems:  Focused cardiovascular and respiratory review of systems is negative other than the symptoms noted above in the HPI.     Physical  "Exam:  Vitals: BP (!) 154/98 (BP Location: Right arm, Patient Position: Sitting)   Pulse 81   Ht 1.753 m (5' 9\")   Wt 111.6 kg (246 lb)   SpO2 97%   BMI 36.33 kg/m     Wt Readings from Last 4 Encounters:   06/28/24 110.2 kg (242 lb 14.4 oz)   06/28/24 111.6 kg (246 lb)   06/25/24 113 kg (249 lb 1.6 oz)   06/24/24 113.2 kg (249 lb 9.6 oz)     CONSTITUTIONAL: Alert and in no acute distress.  NECK: Thick neck.  Unable to visualize JVP due to body habitus.  CARDIOVASCULAR:  Regular rate and rhythm. No murmur, rub or gallop.   RESPIRATORY: Breathing non-labored. Lungs are clear to auscultation with no wheezes or crackles bilaterally.  GASTROINTESTINAL: Abdomen non-distended.  EXTREMITIES: Legs wrapped making it challenging to assess degree of edema, but appears to be around 1-2+ edema in the ankles bilaterally.   NEUROPSYCHIATRIC: Expressive aphasia. Affect appropriate.     Recent Lab Results:  LIPID RESULTS:  Lab Results   Component Value Date    CHOL 135 06/13/2024    HDL 41 06/13/2024    LDL 82 06/13/2024    TRIG 58 06/13/2024     CBC RESULTS:  Lab Results   Component Value Date    WBC 8.8 06/26/2024    RBC 5.18 06/26/2024    HGB 15.9 06/26/2024    HCT 50.2 06/26/2024    MCV 97 06/26/2024    MCH 30.7 06/26/2024    MCHC 31.7 06/26/2024    RDW 13.0 06/26/2024     06/26/2024     BMP RESULTS:  Lab Results   Component Value Date     06/27/2024    POTASSIUM 4.1 06/27/2024    CHLORIDE 105 06/27/2024    CO2 21 (L) 06/27/2024    ANIONGAP 14 06/27/2024     (H) 06/27/2024     (H) 06/20/2024    BUN 22.3 06/27/2024    CR 0.88 06/27/2024    GFRESTIMATED >90 06/27/2024    PARISH 10.1 06/27/2024      A1C RESULTS:  Lab Results   Component Value Date    A1C 6.4 (H) 06/12/2024     INR RESULTS:  Lab Results   Component Value Date    INR 1.41 (H) 06/19/2024    INR 1.28 (H) 06/18/2024     Please kindly note that this document was completed in part using Dragon voice recognition software. Although reviewed after " completion, some word substitutions and typographical errors may occur. Please contact me if clarification is needed.       Thank you for allowing me to participate in the care of your patient.      Sincerely,     Colton Mejias NP     [facility]  cc:   Colton Mejias NP  [address] [id number]       Cigarettes

## 2024-08-30 ENCOUNTER — APPOINTMENT (OUTPATIENT)
Dept: CARDIOLOGY | Facility: CLINIC | Age: 59
End: 2024-08-30
Payer: COMMERCIAL

## 2024-08-30 ENCOUNTER — NON-APPOINTMENT (OUTPATIENT)
Age: 59
End: 2024-08-30

## 2024-08-30 VITALS
BODY MASS INDEX: 28.2 KG/M2 | HEART RATE: 59 BPM | SYSTOLIC BLOOD PRESSURE: 100 MMHG | OXYGEN SATURATION: 95 % | DIASTOLIC BLOOD PRESSURE: 69 MMHG | HEIGHT: 70 IN | WEIGHT: 197 LBS

## 2024-08-30 DIAGNOSIS — Q25.1 COARCTATION OF AORTA: ICD-10-CM

## 2024-08-30 DIAGNOSIS — Q23.1 CONGENITAL INSUFFICIENCY OF AORTIC VALVE: ICD-10-CM

## 2024-08-30 PROCEDURE — 93000 ELECTROCARDIOGRAM COMPLETE: CPT

## 2024-08-30 PROCEDURE — G2211 COMPLEX E/M VISIT ADD ON: CPT | Mod: NC

## 2024-08-30 PROCEDURE — 99214 OFFICE O/P EST MOD 30 MIN: CPT | Mod: 25

## 2024-08-30 RX ORDER — METOPROLOL SUCCINATE 25 MG/1
25 TABLET, EXTENDED RELEASE ORAL DAILY
Qty: 90 | Refills: 3 | Status: ACTIVE | COMMUNITY
Start: 2024-08-30 | End: 1900-01-01

## 2024-08-30 NOTE — HISTORY OF PRESENT ILLNESS
[FreeTextEntry1] : Dean Atkins presented to the office today for a cardiovascular evaluation. He was last seen in the office 6 months ago.  He is now 59 years old, with a history of coarctation of aorta, for which he had surgery many years ago.  A modest degree of stenosis remains based on MRA for which she had stenting performed in January, 2023. He has a bicuspid aortic valve, now status post bioprosthetic aortic valve replacement, March 3, 2023.     When he was evaluated in the office in October, he was feeling well.  Follow-up echocardiography in November 2022 revealed significant progression in his aortic valve disease, into the severe range.  I referred him for an evaluation with the structural heart team.  It was felt that he was best suited for a surgical aortic valve replacement, with stenting of his persistent coarctation prior to the surgery to allow for the best possible myocardial recovery.     I saw him in March 2023, postoperatively, and he was feeling well.  We discussed the possibility of an MRI for intracranial aneurysms, which had been recommended by the pediatric cardiologist he saw in the hospital.  I ordered it, but that was not performed.  I saw him again in May 2023, and he was feeling well at that time.  He was steadily improving in terms of his surgery, and had been increasing his physical activity.  He was again feeling well when he was evaluated in February, 2024.  His PSA had been increasing, and he was planned for a prostate biopsy.  He presents to the office today having been feeling well from a cardiovascular perspective.  He reports no chest discomfort or shortness of breath with activity. His stamina has been improving.  He denies orthopnea, PND and lower extremity edema.  He denies palpitations, and syncope.  He may get a little lightheaded when he gets up quickly.   This is unchanged.  He was diagnosed with prostate cancer and has had RT.

## 2024-08-30 NOTE — PHYSICAL EXAM
[General Appearance - Well Developed] : well developed [Normal Appearance] : normal appearance [Well Groomed] : well groomed [General Appearance - Well Nourished] : well nourished [No Deformities] : no deformities [General Appearance - In No Acute Distress] : no acute distress [Normal Conjunctiva] : the conjunctiva exhibited no abnormalities [Eyelids - No Xanthelasma] : the eyelids demonstrated no xanthelasmas [Normal Oral Mucosa] : normal oral mucosa [No Oral Pallor] : no oral pallor [No Oral Cyanosis] : no oral cyanosis [Normal Jugular Venous A Waves Present] : normal jugular venous A waves present [Normal Jugular Venous V Waves Present] : normal jugular venous V waves present [No Jugular Venous Rachel A Waves] : no jugular venous rachel A waves [Respiration, Rhythm And Depth] : normal respiratory rhythm and effort [Exaggerated Use Of Accessory Muscles For Inspiration] : no accessory muscle use [Auscultation Breath Sounds / Voice Sounds] : lungs were clear to auscultation bilaterally [Abdomen Soft] : soft [Abdomen Tenderness] : non-tender [Abdomen Mass (___ Cm)] : no abdominal mass palpated [Abnormal Walk] : normal gait [Gait - Sufficient For Exercise Testing] : the gait was sufficient for exercise testing [Nail Clubbing] : no clubbing of the fingernails [Cyanosis, Localized] : no localized cyanosis [Petechial Hemorrhages (___cm)] : no petechial hemorrhages [Skin Color & Pigmentation] : normal skin color and pigmentation [No Venous Stasis] : no venous stasis [] : no rash [Skin Lesions] : no skin lesions [No Skin Ulcers] : no skin ulcer [No Xanthoma] : no  xanthoma was observed [Oriented To Time, Place, And Person] : oriented to person, place, and time [Affect] : the affect was normal [Mood] : the mood was normal [No Anxiety] : not feeling anxious [Normal Rate] : normal [Rhythm Regular] : regular [Normal S1] : normal S1 [Normal S2] : normal S2 [No Gallop] : no gallop heard [No Murmur] : no murmurs heard [I] : a grade 1 [Right Carotid Bruit] : right carotid bruit heard [1+] : left 1+ [No Pitting Edema] : no pitting edema present [S3] : no S3 [S4] : no S4 [Left Carotid Bruit] : no bruit heard over the left carotid [Right Femoral Bruit] : no bruit heard over the right femoral artery [Left Femoral Bruit] : no bruit heard over the left femoral artery [Bruit] : no bruit heard

## 2024-08-30 NOTE — DISCUSSION/SUMMARY
[FreeTextEntry1] : Dean has a history of a repaired coarctation of the aorta. He has a history of a bicuspid aortic valve with severe stenosis.  This more recently progressed, and he is now status post stenting of his residual coarctation, as well as surgical bioprosthetic aortic valve replacement.   There was a recommendation by pediatric cardiology in the hospital that he get screened with an MRA to look for aneurysms.  It sounds like he did have one at the age of 31, when he had an episode of transient global amnesia.  Unfortunately that is not available, and so when he is feeling up to it, he will schedule another MRA.  We have discussed this before.  I reviewed his most recent blood work.  I reviewed his echocardiogram from February 2023, his catheterization from January 2023, and his hospital course.  Echocardiography was performed March 18, 2024.  This revealed a normal ejection fraction with normal function of his bioprosthetic aortic valve.  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   His blood pressure is a bit low.  Historically, this has always been the case.  I doubt that there is anything wrong with his subclavian stenting.  I will change his metoprolol to succinate 25 mg daily, which cut his effective dose in half.  He will increase his hydration, as well as his sodium intake.  His examination is otherwise unremarkable.  If all is well, I will see him in 6 months. [EKG obtained to assist in diagnosis and management of assessed problem(s)] : EKG obtained to assist in diagnosis and management of assessed problem(s)

## 2024-09-10 NOTE — ASU PATIENT PROFILE, ADULT - HAVE YOU RECEIVED AT LEAST TWO PFIZER AND/OR MODERNA VACCINATIONS (IN ANY COMBINATION) AND/OR ONE JOHNSON & JOHNSON VACCINATION?
Subjective     Rich Tapia is a 35 y.o. old, male here for Follow-up    Here for f/u on ADD  Doing well on adderall 20 mg once daily.  No significant side effects. He got promoted after a good test score.    Wt Readings from Last 3 Encounters:   09/10/24 0843 76.4 kg (168 lb 6.9 oz)   06/07/24 0817 77.2 kg (170 lb 1.4 oz)   05/20/24 1333 76.7 kg (169 lb)     ROS  Medications     Outpatient Medications Marked as Taking for the 9/10/24 encounter (Office Visit) with Sawyer Block MD   Medication Sig Dispense Refill    [DISCONTINUED] dextroamphetamine-amphetamine (ADDERALL) 20 mg tablet Take 1 tablet by mouth once daily. 30 tablet 0     Objective     /80   Pulse 66   Wt 76.4 kg (168 lb 6.9 oz)   SpO2 98%   BMI 26.78 kg/m²   Physical Exam  Constitutional:       General: He is not in acute distress.     Appearance: Normal appearance. He is well-developed.   Neurological:      Mental Status: He is alert.       Assessment and Plan     Attention deficit disorder, unspecified hyperactivity presence  -     dextroamphetamine-amphetamine (ADDERALL) 20 mg tablet; Take 1 tablet by mouth once daily.  Dispense: 30 tablet; Refill: 0  -     dextroamphetamine-amphetamine (ADDERALL) 20 mg tablet; Take 1 tablet by mouth once daily.  Dispense: 30 tablet; Refill: 0  -     dextroamphetamine-amphetamine (ADDERALL) 20 mg tablet; Take 1 tablet by mouth once daily.  Dispense: 30 tablet; Refill: 0        ___________________  Sawyer Block MD  Internal Medicine and Pediatrics  
Yes

## 2025-02-26 NOTE — PRE-ANESTHESIA EVALUATION ADULT - NSANTHPROCED_GEN_ALL_CORE
Submitted PA for Ozempic (2 MG/DOSE) 8MG/3ML pen-injectors  Via [facility] [id number] STATUS: PENDING.    Follow up done daily; if no decision with in three days we will refax.  If another three days goes by with no decision will call the insurance for status.    
The medication is APPROVED.    Message from Plan  The request has been approved. The authorization is effective from 02/25/2025 to 02/24/2026, as long as the member is enrolled in their current health plan. The request was approved as submitted. This request was approved with a quantity limit of 3mL per 28 days. A written notification letter will follow with additional details.. Authorization Expiration Date: February 24, 2026.    If this requires a response please respond to the pool ( P [id number] P[facility] MEDICATION PRE-AUTH).      Thank you please advise patient.    
Arterial Catheter/Central Venous Catheter/Transesophageal Echocardiogram

## 2025-02-28 ENCOUNTER — APPOINTMENT (OUTPATIENT)
Dept: CARDIOLOGY | Facility: CLINIC | Age: 60
End: 2025-02-28
Payer: COMMERCIAL

## 2025-02-28 ENCOUNTER — NON-APPOINTMENT (OUTPATIENT)
Age: 60
End: 2025-02-28

## 2025-02-28 VITALS
HEIGHT: 70 IN | OXYGEN SATURATION: 95 % | DIASTOLIC BLOOD PRESSURE: 79 MMHG | SYSTOLIC BLOOD PRESSURE: 116 MMHG | WEIGHT: 198 LBS | BODY MASS INDEX: 28.35 KG/M2 | HEART RATE: 60 BPM

## 2025-02-28 DIAGNOSIS — I35.0 NONRHEUMATIC AORTIC (VALVE) STENOSIS: ICD-10-CM

## 2025-02-28 PROCEDURE — 99214 OFFICE O/P EST MOD 30 MIN: CPT | Mod: 25

## 2025-02-28 PROCEDURE — 93000 ELECTROCARDIOGRAM COMPLETE: CPT

## 2025-03-21 ENCOUNTER — APPOINTMENT (OUTPATIENT)
Dept: CARDIOLOGY | Facility: CLINIC | Age: 60
End: 2025-03-21
Payer: COMMERCIAL

## 2025-03-21 PROCEDURE — 93306 TTE W/DOPPLER COMPLETE: CPT

## 2025-09-03 ENCOUNTER — RX RENEWAL (OUTPATIENT)
Age: 60
End: 2025-09-03

## (undated) DEVICE — SUT TICRON 2-0 36" CV-316 DA

## (undated) DEVICE — TUBING KIT FAST START ATF 40

## (undated) DEVICE — ELCTR BOVIE PENCIL HANDPIECE ROCKER SWITCH 15FT

## (undated) DEVICE — PACK CUSTOM W/INSPIRE OXYGENATOR

## (undated) DEVICE — TUBING SUCTION NONCONDUCTIVE 6MM X 12FT

## (undated) DEVICE — SAW BLADE MICROAIRE STERNUM 1X34X9.4MM

## (undated) DEVICE — CHECK VALVE RELIEF VACUUM

## (undated) DEVICE — SUT DOUBLE 6 WIRE STERNAL

## (undated) DEVICE — ELCTR REM POLYHESIVE ADULT PT RETURN 15FT

## (undated) DEVICE — DRAPE SLUSH / WARMER 44 X 66"

## (undated) DEVICE — SUT SOFSILK 0 30" TIES

## (undated) DEVICE — SPONGE PEANUT AUTO COUNT

## (undated) DEVICE — PHRENIC NERVE PAD MEDIUM

## (undated) DEVICE — DRAPE LIGHT HANDLE COVER (BLUE)

## (undated) DEVICE — URETERAL CATH RED RUBBER 8FR

## (undated) DEVICE — SUT BOOT STANDARD (ASSORTED) 5 PAIR

## (undated) DEVICE — GOWN XXXL

## (undated) DEVICE — DRSG DERMABOND 0.7ML

## (undated) DEVICE — SENSOR MYOCARDIAL TEMP 15MM

## (undated) DEVICE — CHEST DRAIN PLEUR-EVAC DRY/WET ADULT-PEDS SINGLE (QUICK)

## (undated) DEVICE — DRSG TEGADERM 2.5X3"

## (undated) DEVICE — PACK CARDIAC YELLOW

## (undated) DEVICE — PACK UNIVERSAL CARDIAC

## (undated) DEVICE — ADAPTOR DLP "Y" FEMALE ON SINGLE LEG 3.5" X 10"

## (undated) DEVICE — FOLEY TRAY 16FR 5CC LF LUBRISIL ADVANCE TEMP CLOSED

## (undated) DEVICE — SET PERF CARDIOPLEGIA 4 ARM STRL

## (undated) DEVICE — SUT PLEDGET PRE PUNCH 4.8 X 9.5 X 1.5 MM

## (undated) DEVICE — TUBING SUCTION 20FT

## (undated) DEVICE — SUT SOFSILK 2-0 18" V-20 (POP-OFF)

## (undated) DEVICE — SUT STAINLESS STEEL 5 18" SCC

## (undated) DEVICE — TUBING TRUWAVE PRESSURE MALE/FEMALE 12"

## (undated) DEVICE — SOL NORMOSOL-R PH7.4 1000ML

## (undated) DEVICE — PACING CABLE (BLUE) ATRIAL TEMP SCREW DOWN 12FT

## (undated) DEVICE — SYR LUER LOK 30CC

## (undated) DEVICE — PACING CABLE BI-V TEMP ALLIGATOR CLIP 12FT

## (undated) DEVICE — SUT POLYSORB 1 36" GS-25

## (undated) DEVICE — NDL COUNTER FOAM AND MAGNET 40-70

## (undated) DEVICE — TUBING TRUWAVE PRESSURE MALE/FEMALE 72"

## (undated) DEVICE — CATH IV INTROCAN SAFETY 14G X 1.25" (ORANGE) FEP

## (undated) DEVICE — MARKING PEN W RULER

## (undated) DEVICE — STOPCOCK 4-WAY NYLON MALE LL ADAPTER LG BORE

## (undated) DEVICE — TUBING IV EXTENSION MICROBORE W MICROCLAVE 7"

## (undated) DEVICE — SUCTION CATH ARGYLE WHISTLE TIP 14FR STRAIGHT PACKED

## (undated) DEVICE — FILTER REINFUSION FOR SALVAGED BLOOD DISP

## (undated) DEVICE — SUT PROLENE 5-0 36" RB-1

## (undated) DEVICE — GLV 7.5 PROTEXIS (WHITE)

## (undated) DEVICE — DRSG DERMABOND PRINEO 60CM

## (undated) DEVICE — SUT PROLENE 4-0 36" RB-1

## (undated) DEVICE — VENTING ADAPTER "Y" (RED/BLUE) 7.5"

## (undated) DEVICE — SYR LUER LOK 1CC

## (undated) DEVICE — PREP DURAPREP 26CC

## (undated) DEVICE — SUT SILK 5-0 60" TIES

## (undated) DEVICE — SUT BLUNT SZ 5

## (undated) DEVICE — VESSEL LOOP MAXI-YELLOW  0.120" X 16"

## (undated) DEVICE — PACING CABLE (BROWN) A/V TEMP SCREW DOWN 12FT

## (undated) DEVICE — ELCTR BOVIE TIP BLADE MEGADYNE E-Z CLEAN 2.5" (SHORT)

## (undated) DEVICE — TUBING IV SET MICROCLAVE ADAPTER

## (undated) DEVICE — SUT SILK 2-0 18" SH (POP-OFF)

## (undated) DEVICE — SYR LUER LOK 50CC

## (undated) DEVICE — DRAIN CHANNEL 19FR ROUND FULL FLUTED

## (undated) DEVICE — SUT ETHIBOND 2-0 4-30" RB-1 WHITE

## (undated) DEVICE — SUCTION YANKAUER OPEN TIP NO VENT CURVE

## (undated) DEVICE — ELCTR BOVIE TIP BLADE MEGADYNE E-Z CLEAN 6.5" (LONG)

## (undated) DEVICE — VENODYNE/SCD SLEEVE CALF MEDIUM

## (undated) DEVICE — WARMING BLANKET FULL UNDERBODY

## (undated) DEVICE — SUMP INTRACARDIAC 20FR 1/4" ADULT

## (undated) DEVICE — Device

## (undated) DEVICE — TOURNIQUET SET TOURNIKWIK 12FR (4 TUBES, 1 SNARE) 7.5"

## (undated) DEVICE — DRAPE MAYO STAND 30"

## (undated) DEVICE — DRAPE ULTRASOUND PROBE COVER W ULTRA GEL 18X120CM

## (undated) DEVICE — SOL INJ LR 1000ML

## (undated) DEVICE — SOL IRR BAG NS 0.9% 3000ML

## (undated) DEVICE — NDL HYPO SAFE 18G X 1.5" (PINK)

## (undated) DEVICE — TOURNIQUET SET SURE-SNARE 22FR (2 TUBES, 2 UMBILICAL TAPES, 2 PLASTIC SNARES) 5"

## (undated) DEVICE — DRAPE IOBAN 33" X 23"

## (undated) DEVICE — CHEST DRAIN OASIS DRY SUCTION WATER SEAL

## (undated) DEVICE — SUT BOOT STANDARD (YELLOW) 5 PAIR

## (undated) DEVICE — TUBING INSUFFLATION LAP FILTER 10FT